# Patient Record
Sex: FEMALE | Race: WHITE | NOT HISPANIC OR LATINO | Employment: OTHER | URBAN - METROPOLITAN AREA
[De-identification: names, ages, dates, MRNs, and addresses within clinical notes are randomized per-mention and may not be internally consistent; named-entity substitution may affect disease eponyms.]

---

## 2018-02-12 ENCOUNTER — HOSPITAL ENCOUNTER (INPATIENT)
Facility: HOSPITAL | Age: 78
LOS: 1 days | Discharge: HOME | DRG: 395 | End: 2018-02-13
Attending: FAMILY MEDICINE | Admitting: FAMILY MEDICINE
Payer: MEDICARE

## 2018-02-12 ENCOUNTER — ANESTHESIA (OUTPATIENT)
Dept: ENDOSCOPY | Facility: HOSPITAL | Age: 78
DRG: 395 | End: 2018-02-12
Payer: MEDICARE

## 2018-02-12 ENCOUNTER — ANESTHESIA EVENT (OUTPATIENT)
Dept: ENDOSCOPY | Facility: HOSPITAL | Age: 78
DRG: 395 | End: 2018-02-12
Payer: MEDICARE

## 2018-02-12 DIAGNOSIS — K80.50 CHOLEDOCHOLITHIASIS: Primary | ICD-10-CM

## 2018-02-12 DIAGNOSIS — R10.9 ABDOMINAL PAIN, UNSPECIFIED ABDOMINAL LOCATION: ICD-10-CM

## 2018-02-12 LAB
ALBUMIN SERPL BCP-MCNC: 3.3 G/DL
ALP SERPL-CCNC: 216 U/L
ALT SERPL W/O P-5'-P-CCNC: 625 U/L
ANION GAP SERPL CALC-SCNC: 7 MMOL/L
APTT BLDCRRT: 23.6 SEC
AST SERPL-CCNC: 688 U/L
BASOPHILS # BLD AUTO: 0.02 K/UL
BASOPHILS NFR BLD: 0.2 %
BILIRUB SERPL-MCNC: 3.1 MG/DL
BUN SERPL-MCNC: 10 MG/DL
CALCIUM SERPL-MCNC: 8.5 MG/DL
CHLORIDE SERPL-SCNC: 106 MMOL/L
CO2 SERPL-SCNC: 25 MMOL/L
CREAT SERPL-MCNC: 0.7 MG/DL
DIFFERENTIAL METHOD: ABNORMAL
EOSINOPHIL # BLD AUTO: 0 K/UL
EOSINOPHIL NFR BLD: 0.2 %
ERYTHROCYTE [DISTWIDTH] IN BLOOD BY AUTOMATED COUNT: 13.1 %
EST. GFR  (AFRICAN AMERICAN): >60 ML/MIN/1.73 M^2
EST. GFR  (NON AFRICAN AMERICAN): >60 ML/MIN/1.73 M^2
ESTIMATED AVG GLUCOSE: 111 MG/DL
GLUCOSE SERPL-MCNC: 113 MG/DL
HBA1C MFR BLD HPLC: 5.5 %
HCT VFR BLD AUTO: 34.2 %
HGB BLD-MCNC: 11.1 G/DL
INR PPP: 1.1
LIPASE SERPL-CCNC: 113 U/L
LYMPHOCYTES # BLD AUTO: 0.7 K/UL
LYMPHOCYTES NFR BLD: 6 %
MAGNESIUM SERPL-MCNC: 1.9 MG/DL
MCH RBC QN AUTO: 30.2 PG
MCHC RBC AUTO-ENTMCNC: 32.5 G/DL
MCV RBC AUTO: 93 FL
MONOCYTES # BLD AUTO: 1 K/UL
MONOCYTES NFR BLD: 8.2 %
NEUTROPHILS # BLD AUTO: 10.1 K/UL
NEUTROPHILS NFR BLD: 85.1 %
PHOSPHATE SERPL-MCNC: 3.7 MG/DL
PLATELET # BLD AUTO: 144 K/UL
PMV BLD AUTO: 10.3 FL
POTASSIUM SERPL-SCNC: 3.7 MMOL/L
PROT SERPL-MCNC: 6.2 G/DL
PROTHROMBIN TIME: 11.4 SEC
RBC # BLD AUTO: 3.68 M/UL
SODIUM SERPL-SCNC: 138 MMOL/L
T4 FREE SERPL-MCNC: 1.28 NG/DL
TSH SERPL DL<=0.005 MIU/L-ACNC: 0.39 UIU/ML
WBC # BLD AUTO: 11.87 K/UL

## 2018-02-12 PROCEDURE — 37000008 HC ANESTHESIA 1ST 15 MINUTES: Performed by: INTERNAL MEDICINE

## 2018-02-12 PROCEDURE — 85730 THROMBOPLASTIN TIME PARTIAL: CPT

## 2018-02-12 PROCEDURE — 74328 X-RAY BILE DUCT ENDOSCOPY: CPT | Mod: 26,,, | Performed by: INTERNAL MEDICINE

## 2018-02-12 PROCEDURE — 43262 ENDO CHOLANGIOPANCREATOGRAPH: CPT | Mod: 51,,, | Performed by: INTERNAL MEDICINE

## 2018-02-12 PROCEDURE — 83690 ASSAY OF LIPASE: CPT

## 2018-02-12 PROCEDURE — 0FC98ZZ EXTIRPATION OF MATTER FROM COMMON BILE DUCT, VIA NATURAL OR ARTIFICIAL OPENING ENDOSCOPIC: ICD-10-PCS | Performed by: INTERNAL MEDICINE

## 2018-02-12 PROCEDURE — 85025 COMPLETE CBC W/AUTO DIFF WBC: CPT

## 2018-02-12 PROCEDURE — 84443 ASSAY THYROID STIM HORMONE: CPT

## 2018-02-12 PROCEDURE — 84439 ASSAY OF FREE THYROXINE: CPT

## 2018-02-12 PROCEDURE — 25000003 PHARM REV CODE 250: Performed by: INTERNAL MEDICINE

## 2018-02-12 PROCEDURE — S0028 INJECTION, FAMOTIDINE, 20 MG: HCPCS | Performed by: FAMILY MEDICINE

## 2018-02-12 PROCEDURE — 37000009 HC ANESTHESIA EA ADD 15 MINS: Performed by: INTERNAL MEDICINE

## 2018-02-12 PROCEDURE — 11000001 HC ACUTE MED/SURG PRIVATE ROOM

## 2018-02-12 PROCEDURE — 85610 PROTHROMBIN TIME: CPT

## 2018-02-12 PROCEDURE — 43262 ENDO CHOLANGIOPANCREATOGRAPH: CPT | Performed by: INTERNAL MEDICINE

## 2018-02-12 PROCEDURE — 43264 ERCP REMOVE DUCT CALCULI: CPT | Mod: ,,, | Performed by: INTERNAL MEDICINE

## 2018-02-12 PROCEDURE — 83036 HEMOGLOBIN GLYCOSYLATED A1C: CPT

## 2018-02-12 PROCEDURE — 43237 ENDOSCOPIC US EXAM ESOPH: CPT | Mod: 51,,, | Performed by: INTERNAL MEDICINE

## 2018-02-12 PROCEDURE — 63600175 PHARM REV CODE 636 W HCPCS: Performed by: FAMILY MEDICINE

## 2018-02-12 PROCEDURE — 63600175 PHARM REV CODE 636 W HCPCS: Performed by: NURSE ANESTHETIST, CERTIFIED REGISTERED

## 2018-02-12 PROCEDURE — 36415 COLL VENOUS BLD VENIPUNCTURE: CPT

## 2018-02-12 PROCEDURE — 94761 N-INVAS EAR/PLS OXIMETRY MLT: CPT

## 2018-02-12 PROCEDURE — 43264 ERCP REMOVE DUCT CALCULI: CPT | Performed by: INTERNAL MEDICINE

## 2018-02-12 PROCEDURE — 27201674 HC SPHINCTERTOME: Performed by: INTERNAL MEDICINE

## 2018-02-12 PROCEDURE — 80053 COMPREHEN METABOLIC PANEL: CPT

## 2018-02-12 PROCEDURE — 25000003 PHARM REV CODE 250: Performed by: NURSE ANESTHETIST, CERTIFIED REGISTERED

## 2018-02-12 PROCEDURE — 25000003 PHARM REV CODE 250: Performed by: FAMILY MEDICINE

## 2018-02-12 PROCEDURE — 27202125 HC BALLOON, EXTRACTION (ANY): Performed by: INTERNAL MEDICINE

## 2018-02-12 PROCEDURE — 0DJ08ZZ INSPECTION OF UPPER INTESTINAL TRACT, VIA NATURAL OR ARTIFICIAL OPENING ENDOSCOPIC: ICD-10-PCS | Performed by: INTERNAL MEDICINE

## 2018-02-12 PROCEDURE — 83735 ASSAY OF MAGNESIUM: CPT

## 2018-02-12 PROCEDURE — 84100 ASSAY OF PHOSPHORUS: CPT

## 2018-02-12 PROCEDURE — C1769 GUIDE WIRE: HCPCS | Performed by: INTERNAL MEDICINE

## 2018-02-12 RX ORDER — HYDROMORPHONE HYDROCHLORIDE 2 MG/ML
0.2 INJECTION, SOLUTION INTRAMUSCULAR; INTRAVENOUS; SUBCUTANEOUS EVERY 5 MIN PRN
Status: CANCELLED | OUTPATIENT
Start: 2018-02-12

## 2018-02-12 RX ORDER — GLUCAGON 1 MG
1 KIT INJECTION
Status: DISCONTINUED | OUTPATIENT
Start: 2018-02-12 | End: 2018-02-13 | Stop reason: HOSPADM

## 2018-02-12 RX ORDER — SODIUM CHLORIDE 0.9 % (FLUSH) 0.9 %
5 SYRINGE (ML) INJECTION
Status: DISCONTINUED | OUTPATIENT
Start: 2018-02-12 | End: 2018-02-13 | Stop reason: HOSPADM

## 2018-02-12 RX ORDER — SODIUM CHLORIDE 0.9 % (FLUSH) 0.9 %
3 SYRINGE (ML) INJECTION EVERY 8 HOURS
Status: CANCELLED | OUTPATIENT
Start: 2018-02-12

## 2018-02-12 RX ORDER — IBUPROFEN 200 MG
16 TABLET ORAL
Status: DISCONTINUED | OUTPATIENT
Start: 2018-02-12 | End: 2018-02-13 | Stop reason: HOSPADM

## 2018-02-12 RX ORDER — FENTANYL CITRATE 50 UG/ML
INJECTION, SOLUTION INTRAMUSCULAR; INTRAVENOUS
Status: DISCONTINUED | OUTPATIENT
Start: 2018-02-12 | End: 2018-02-12

## 2018-02-12 RX ORDER — CEFEPIME HYDROCHLORIDE 1 G/50ML
1 INJECTION, SOLUTION INTRAVENOUS
Status: DISCONTINUED | OUTPATIENT
Start: 2018-02-12 | End: 2018-02-12

## 2018-02-12 RX ORDER — IBUPROFEN 200 MG
24 TABLET ORAL
Status: DISCONTINUED | OUTPATIENT
Start: 2018-02-12 | End: 2018-02-13 | Stop reason: HOSPADM

## 2018-02-12 RX ORDER — SODIUM CHLORIDE 9 MG/ML
INJECTION, SOLUTION INTRAVENOUS CONTINUOUS
Status: DISCONTINUED | OUTPATIENT
Start: 2018-02-12 | End: 2018-02-13

## 2018-02-12 RX ORDER — GLYCOPYRROLATE 0.2 MG/ML
INJECTION INTRAMUSCULAR; INTRAVENOUS
Status: DISCONTINUED | OUTPATIENT
Start: 2018-02-12 | End: 2018-02-12

## 2018-02-12 RX ORDER — LEVOTHYROXINE SODIUM 75 UG/1
75 TABLET ORAL DAILY
COMMUNITY
End: 2018-03-01 | Stop reason: DRUGHIGH

## 2018-02-12 RX ORDER — IPRATROPIUM BROMIDE AND ALBUTEROL SULFATE 2.5; .5 MG/3ML; MG/3ML
3 SOLUTION RESPIRATORY (INHALATION) EVERY 6 HOURS PRN
Status: DISCONTINUED | OUTPATIENT
Start: 2018-02-12 | End: 2018-02-13 | Stop reason: HOSPADM

## 2018-02-12 RX ORDER — HYDROMORPHONE HYDROCHLORIDE 2 MG/ML
1 INJECTION, SOLUTION INTRAMUSCULAR; INTRAVENOUS; SUBCUTANEOUS EVERY 4 HOURS PRN
Status: DISCONTINUED | OUTPATIENT
Start: 2018-02-12 | End: 2018-02-12

## 2018-02-12 RX ORDER — FAMOTIDINE 10 MG/ML
20 INJECTION INTRAVENOUS 2 TIMES DAILY
Status: DISCONTINUED | OUTPATIENT
Start: 2018-02-12 | End: 2018-02-13 | Stop reason: HOSPADM

## 2018-02-12 RX ORDER — SIMVASTATIN 40 MG/1
40 TABLET, FILM COATED ORAL NIGHTLY
Status: DISCONTINUED | OUTPATIENT
Start: 2018-02-12 | End: 2018-02-13 | Stop reason: HOSPADM

## 2018-02-12 RX ORDER — ONDANSETRON 2 MG/ML
4 INJECTION INTRAMUSCULAR; INTRAVENOUS EVERY 8 HOURS PRN
Status: DISCONTINUED | OUTPATIENT
Start: 2018-02-12 | End: 2018-02-13 | Stop reason: HOSPADM

## 2018-02-12 RX ORDER — HYDROMORPHONE HYDROCHLORIDE 2 MG/ML
1 INJECTION, SOLUTION INTRAMUSCULAR; INTRAVENOUS; SUBCUTANEOUS EVERY 4 HOURS PRN
Status: DISCONTINUED | OUTPATIENT
Start: 2018-02-12 | End: 2018-02-13 | Stop reason: HOSPADM

## 2018-02-12 RX ORDER — INSULIN ASPART 100 [IU]/ML
1-10 INJECTION, SOLUTION INTRAVENOUS; SUBCUTANEOUS
Status: DISCONTINUED | OUTPATIENT
Start: 2018-02-12 | End: 2018-02-13 | Stop reason: HOSPADM

## 2018-02-12 RX ORDER — RAMELTEON 8 MG/1
8 TABLET ORAL NIGHTLY PRN
Status: DISCONTINUED | OUTPATIENT
Start: 2018-02-12 | End: 2018-02-13 | Stop reason: HOSPADM

## 2018-02-12 RX ORDER — INDOMETHACIN 50 MG/1
100 SUPPOSITORY RECTAL ONCE
Status: COMPLETED | OUTPATIENT
Start: 2018-02-12 | End: 2018-02-12

## 2018-02-12 RX ORDER — PROPOFOL 10 MG/ML
VIAL (ML) INTRAVENOUS CONTINUOUS PRN
Status: DISCONTINUED | OUTPATIENT
Start: 2018-02-12 | End: 2018-02-12

## 2018-02-12 RX ORDER — SODIUM CHLORIDE 0.9 % (FLUSH) 0.9 %
3 SYRINGE (ML) INJECTION
Status: CANCELLED | OUTPATIENT
Start: 2018-02-12

## 2018-02-12 RX ORDER — AMOXICILLIN AND CLAVULANATE POTASSIUM 500; 125 MG/1; MG/1
1 TABLET, FILM COATED ORAL 3 TIMES DAILY
Qty: 42 TABLET | Refills: 0 | Status: SHIPPED | OUTPATIENT
Start: 2018-02-12 | End: 2018-02-26

## 2018-02-12 RX ORDER — LEVOTHYROXINE SODIUM 75 UG/1
75 TABLET ORAL
Status: DISCONTINUED | OUTPATIENT
Start: 2018-02-12 | End: 2018-02-13 | Stop reason: HOSPADM

## 2018-02-12 RX ORDER — LIDOCAINE HCL/PF 100 MG/5ML
SYRINGE (ML) INTRAVENOUS
Status: DISCONTINUED | OUTPATIENT
Start: 2018-02-12 | End: 2018-02-12

## 2018-02-12 RX ADMIN — SODIUM CHLORIDE: 0.9 INJECTION, SOLUTION INTRAVENOUS at 01:02

## 2018-02-12 RX ADMIN — GLYCOPYRROLATE 0.1 MG: 0.2 INJECTION, SOLUTION INTRAMUSCULAR; INTRAVENOUS at 08:02

## 2018-02-12 RX ADMIN — ONDANSETRON 4 MG: 2 INJECTION INTRAMUSCULAR; INTRAVENOUS at 07:02

## 2018-02-12 RX ADMIN — HYDROMORPHONE HYDROCHLORIDE 1 MG: 2 INJECTION, SOLUTION INTRAMUSCULAR; INTRAVENOUS; SUBCUTANEOUS at 03:02

## 2018-02-12 RX ADMIN — SODIUM CHLORIDE: 0.9 INJECTION, SOLUTION INTRAVENOUS at 05:02

## 2018-02-12 RX ADMIN — LIDOCAINE HYDROCHLORIDE 75 MG: 20 INJECTION, SOLUTION INTRAVENOUS at 08:02

## 2018-02-12 RX ADMIN — FAMOTIDINE 20 MG: 10 INJECTION, SOLUTION INTRAVENOUS at 07:02

## 2018-02-12 RX ADMIN — FENTANYL CITRATE 50 MCG: 50 INJECTION, SOLUTION INTRAMUSCULAR; INTRAVENOUS at 08:02

## 2018-02-12 RX ADMIN — SIMVASTATIN 40 MG: 40 TABLET, FILM COATED ORAL at 03:02

## 2018-02-12 RX ADMIN — LEVOTHYROXINE SODIUM 75 MCG: 75 TABLET ORAL at 05:02

## 2018-02-12 RX ADMIN — FAMOTIDINE 20 MG: 10 INJECTION, SOLUTION INTRAVENOUS at 08:02

## 2018-02-12 RX ADMIN — PROPOFOL 150 MCG/KG/MIN: 10 INJECTION, EMULSION INTRAVENOUS at 08:02

## 2018-02-12 RX ADMIN — INDOMETHACIN 100 MG: 50 SUPPOSITORY RECTAL at 09:02

## 2018-02-12 RX ADMIN — CEFEPIME HYDROCHLORIDE 1 G: 1 INJECTION, SOLUTION INTRAVENOUS at 07:02

## 2018-02-12 RX ADMIN — SIMVASTATIN 40 MG: 40 TABLET, FILM COATED ORAL at 08:02

## 2018-02-12 NOTE — DISCHARGE INSTRUCTIONS
Post ERCP Discharge Instructions:     Shanthi Can  2/12/2018  Dejon Palacios III, MD    1. Diet: Try sips of water first. If tolerated, resume your regular diet or one recommended by your physician.  2. Do not drive, or operate machinery, make critical decisions, or do activities that require coordination or balance for 24 hours.  3. You may experience a sore throat for 24 to 48 hours. You may use throat lozenges or gargle with warm salt water to relieve the discomfort.  4. Because air was put into your stomach during the procedure, you may experience some belching.  5. Go directly to the emergency room if you notice any of the following:                              *Chills and/or fever over 101                *Persistent vomiting or vomiting with blood                *Severe abdominal pain, other than gas cramps                *Severe chest pain                *Black, tarry stools    If you have any questions or problems, please call your Physician:    Dejon Palacios III, MD     If a complication or emergency situation arises and you are unable to reach your Physician - GO TO THE EMERGENCY ROOM.  Post Upper EUS Instructions:     Shanthi Can  2/12/2018  Dejon Palacios III, MD    1. Diet: Try sips of water first. If tolerated, resume your regular diet or one recommended by your physician.  2. Do not drive, or operate machinery, make critical decisions, or do activities that require coordination or balance for 24 hours.  3. You may experience a sore throat for 24 to 48 hours. You may use throat lozenges or gargle with warm salt water to relieve the discomfort.  4. Because air was put into your stomach during the procedure, you may experience some belching.   Go directly to the emergency room if you notice any of the following:                              Chills and/or fever over 101                Persistent vomiting or vomiting with blood                Severe abdominal pain, other than gas cramps                 Severe chest pain                Black, tarry stools    If you have any questions or problems, please call your Physician:    Dejon Palacios III, MD     Lab Results: [phone]    If a complication or emergency situation arises and you are unable to reach your Physician - GO TO THE EMERGENCY ROOM.

## 2018-02-12 NOTE — PLAN OF CARE
Patient has adequate support to assist at discharge.  She is independent with all activities of daily living.  Patient request bedside pharmacy delivery.  She is aware that her discharge is scheduled for tomorrow.  Patient reported she will discharge home with 14 days of PO antibiotic.   said she will request that the physicians write orders for antibiotic to be delivered to bedside today.    Dr. Honeycutt informed that patient request antibiotic be delivered from bedside pharmacy.  [facility] bedside pharmacy will be closed tomorrow.  Dr. Honeycutt said he will write order today.         02/12/18 1302   Discharge Assessment   Assessment Type Discharge Planning Assessment   Assessment information obtained from? Patient;Other  ( met with patient, , and son at bedside.)   Expected Length of Stay (days) 2   Communicated expected length of stay with patient/caregiver yes   Prior to hospitilization cognitive status: Alert/Oriented   Prior to hospitalization functional status: Independent   Current cognitive status: Alert/Oriented   Current Functional Status: Independent   Facility Arrived From: (Home)   Lives With spouse   Able to Return to Prior Arrangements yes   Is patient able to care for self after discharge? Yes   Who are your caregiver(s) and their phone number(s)? (Son, Casimiro Can [phone])   Patient's perception of discharge disposition home or selfcare   Readmission Within The Last 30 Days no previous admission in last 30 days   Patient currently being followed by outpatient case management? No   Patient currently receives any other outside agency services? No   Equipment Currently Used at Home none   Do you have any problems affording any of your prescribed medications? No   Is the patient taking medications as prescribed? yes   Does the patient have transportation home? Yes   Transportation Available family or friend will provide   Does the patient receive services at the  [facility]? No   Discharge Plan A Home;Home with family   Discharge Plan B Home with family   Patient/Family In Agreement With Plan yes   Does the patient have transportation to healthcare appointments? Yes

## 2018-02-12 NOTE — TRANSFER OF CARE
"Anesthesia Transfer of Care Note    Patient: Shanthi Can    Procedure(s) Performed: Procedure(s) (LRB):  ERCP (N/A)  ULTRASOUND-ENDOSCOPIC-UPPER (N/A)    Patient location: GI    Anesthesia Type: MAC    Transport from OR: Transported from OR on room air with adequate spontaneous ventilation    Post pain: adequate analgesia    Post assessment: no apparent anesthetic complications    Post vital signs: stable    Level of consciousness: responds to stimulation and sedated    Nausea/Vomiting: no nausea/vomiting    Complications: none    Transfer of care protocol was followed      Last vitals:   Visit Vitals  BP (!) 105/56   Pulse 83   Temp 36.8 °C (98.3 °F) (Oral)   Resp 18   Ht 4' 11" (1.499 m)   Wt 62.5 kg (137 lb 12.6 oz)   SpO2 95%   Breastfeeding? No   BMI 27.83 kg/m²     "

## 2018-02-12 NOTE — ASSESSMENT & PLAN NOTE
Highly suspect choledocholithiasis vs. Biliary stricture with partial obstruction. No imaging to review.   - Will plan for ERCP today  - Keep NPO and hold anticoagulants  - Add on lipase   - Further recs to follow

## 2018-02-12 NOTE — PLAN OF CARE
Problem: Patient Care Overview  Goal: Plan of Care Review  Outcome: Ongoing (interventions implemented as appropriate)  AAOX3.  Respirations even and unlabpred; breath sounds clear.  O2 2L NC in place with saturation 98%.  Denies n/v and sob.  C/o lower back pain; received dilaudid 1mg iv with relief of pain.  Tolerating ice chips.  Bowel sounds active with no tenderness noted.  Fall precaution sign on door.  Fall precaution armband on.  Family members x2 at bedside.  Instructed to call for assistance.  Will cont to monitor.

## 2018-02-12 NOTE — ANESTHESIA POSTPROCEDURE EVALUATION
"Anesthesia Post Evaluation    Patient: Shanthi Can    Procedure(s) Performed: Procedure(s) (LRB):  ERCP (N/A)  ULTRASOUND-ENDOSCOPIC-UPPER (N/A)    Final Anesthesia Type: MAC  Patient location during evaluation: GI PACU  Patient participation: Yes- Able to Participate  Level of consciousness: awake and alert  Post-procedure vital signs: reviewed and stable  Pain management: adequate  Airway patency: patent  PONV status at discharge: No PONV  Anesthetic complications: no      Cardiovascular status: hemodynamically stable  Respiratory status: unassisted, spontaneous ventilation and room air  Hydration status: euvolemic  Follow-up not needed.        Visit Vitals  BP (!) 105/56   Pulse 83   Temp 36.8 °C (98.3 °F) (Oral)   Resp 18   Ht 4' 11" (1.499 m)   Wt 62.5 kg (137 lb 12.6 oz)   SpO2 95%   Breastfeeding? No   BMI 27.83 kg/m²       Pain/Deya Score: Pain Assessment Performed: Yes (2/12/2018  8:23 AM)  Presence of Pain: denies (2/12/2018  8:23 AM)  Pain Rating Prior to Med Admin: 6 (2/12/2018  3:20 AM)  Pain Rating Post Med Admin: 2 (2/12/2018  5:40 AM)      "

## 2018-02-12 NOTE — CONSULTS
[facility]  Gastroenterology  Consult Note    Patient Name: Shanthi Can  MRN: [id number]  Admission Date: 2/12/2018  Hospital Length of Stay: 0 days  Code Status: Full Code   Attending Provider: Yoandy Smith MD  Consulting Provider: Toñito Garcia MD  Primary Care Physician: Primary Doctor No  Principal Problem:Choledocholithiasis    Consults  Subjective:     HPI:  78 yo F h/o gallstone pancreatitis p/w 1 week of intermittent, epigastric, colicky, sharp pain lasting a few hours and resolving starting 6 days ago not a/w changes in bowels or n/v. After a few mild episodes and then another more painful experience on yesterday she presented to the Ed. She is transferred for possible choledocholithiasis. She denies fever, jaundice, or confusion. Pain is improved with dilaudid. NPO since midnight x meds. One year ago she had lap ruby for biliary pancreatitis. She has never had ERCP before and takes no blood thinners except ASA 81 mg.     Past Medical History:   Diagnosis Date    Encounter for blood transfusion     GERD (gastroesophageal reflux disease)        Past Surgical History:   Procedure Laterality Date    CHOLECYSTECTOMY  2017    DILATION AND CURETTAGE OF UTERUS      TENDON RELEASE Bilateral     GREAT TOES    TONSILLECTOMY         Review of patient's allergies indicates:  No Known Allergies  Family History     None        Social History Main Topics    Smoking status: Former Smoker     Packs/day: 1.00     Years: 20.00     Types: Cigarettes     Quit date: 1985    Smokeless tobacco: Never Used    Alcohol use Yes      Comment: OCCASSIONAL    Drug use: No    Sexual activity: Not Currently     Review of Systems   Constitutional: Negative for activity change, appetite change, chills and fever.   HENT: Negative for congestion, facial swelling, sore throat and trouble swallowing.    Eyes: Negative for pain and redness.   Respiratory: Negative for cough, chest tightness and shortness of  breath.    Cardiovascular: Negative for chest pain and palpitations.   Gastrointestinal: Positive for abdominal pain. Negative for abdominal distention, blood in stool, constipation, diarrhea and nausea.   Endocrine: Negative for polydipsia and polyuria.   Genitourinary: Negative for difficulty urinating and dysuria.   Musculoskeletal: Negative for joint swelling and neck pain.   Neurological: Negative for dizziness and headaches.   Hematological: Negative for adenopathy. Does not bruise/bleed easily.   Psychiatric/Behavioral: Negative for agitation and confusion.     Objective:     Vital Signs (Most Recent):  Temp: 97.4 °F (36.3 °C) (02/12/18 0424)  Pulse: 81 (02/12/18 0424)  Resp: 18 (02/12/18 0424)  BP: (!) 111/55 (02/12/18 0424)  SpO2: 98 % (02/12/18 0424) Vital Signs (24h Range):  Temp:  [97.4 °F (36.3 °C)-99.6 °F (37.6 °C)] 97.4 °F (36.3 °C)  Pulse:  [81-91] 81  Resp:  [18] 18  SpO2:  [98 %] 98 %  BP: (111-113)/(54-55) 111/55     Weight: 62.5 kg (137 lb 12.6 oz) (02/12/18 0120)  Body mass index is 27.83 kg/m².    No intake or output data in the 24 hours ending 02/12/18 0626    Lines/Drains/Airways     Peripheral Intravenous Line                 Peripheral IV - Single Lumen 02/11/18 Right Antecubital 1 day                Physical Exam   Constitutional: She is oriented to person, place, and time. She appears well-developed and well-nourished.   HENT:   Head: Normocephalic.   Nose: Nose normal.   Mouth/Throat: Oropharynx is clear and moist.   Eyes: Conjunctivae and EOM are normal.   constricted b/l pupils   Neck: Normal range of motion. Neck supple. No JVD present.   Cardiovascular: Normal rate, regular rhythm and normal heart sounds.    No murmur heard.  Pulmonary/Chest: Effort normal and breath sounds normal. She has no rales.   Abdominal: Soft. Bowel sounds are normal. She exhibits no distension. There is no tenderness. There is no guarding.   Musculoskeletal: Normal range of motion. She exhibits no edema or  deformity.   Neurological: She is alert and oriented to person, place, and time. No cranial nerve deficit.   Skin: Skin is warm and dry. Capillary refill takes less than 2 seconds.   Abdominal healed surgical scars   Psychiatric: She has a normal mood and affect. Her behavior is normal. Thought content normal.       Significant Labs:  CBC:   Recent Labs  Lab 02/12/18  0401   WBC 11.87   HGB 11.1*   HCT 34.2*   *     CMP:   Recent Labs  Lab 02/12/18  0401   *   CALCIUM 8.5*   ALBUMIN 3.3*   PROT 6.2      K 3.7   CO2 25      BUN 10   CREATININE 0.7   ALKPHOS 216*   *   *   BILITOT 3.1*       Significant Imaging:  None on file    Assessment/Plan:     * Choledocholithiasis    Highly suspect choledocholithiasis vs. Biliary stricture with partial obstruction. No imaging to review.   - Will plan for ERCP today  - Keep NPO and hold anticoagulants  - Add on lipase   - Further recs to follow            Thank you for your consult. I will follow-up with patient. Please contact us if you have any additional questions.    Toñito Garcia MD  Gastroenterology  [facility]

## 2018-02-12 NOTE — HPI
78 yo F h/o gallstone pancreatitis p/w 1 week of intermittent, epigastric, colicky, sharp pain lasting a few hours and resolving starting 6 days ago not a/w changes in bowels or n/v. After a few mild episodes and then another more painful experience on yesterday she presented to the Ed. She is transferred for possible choledocholithiasis. She denies fever, jaundice, or confusion. Pain is improved with dilaudid. NPO since midnight x meds. One year ago she had lap ruby for biliary pancreatitis. She has never had ERCP before and takes no blood thinners except ASA 81 mg.

## 2018-02-12 NOTE — ANESTHESIA PREPROCEDURE EVALUATION
02/12/2018  Shanthi Can is a 77 y.o., female w gall stone cholangitis admitted thru ED for ERCP.    PRIOR ANES (in Epic)  [id number]   none    ANES-RELATED MED/SURG  Patient Active Problem List   Diagnosis    Choledocholithiasis     Past Medical History:   Diagnosis Date    Encounter for blood transfusion     GERD (gastroesophageal reflux disease)      Past Surgical History:   Procedure Laterality Date    CHOLECYSTECTOMY  2017    DILATION AND CURETTAGE OF UTERUS      TENDON RELEASE Bilateral     GREAT TOES    TONSILLECTOMY       ALLERGIES  Review of patient's allergies indicates:  No Known Allergies    ANES-RELATED MAR [id number]  simvistatin  levothyroxine  famotidine cefipime    Anesthesia Evaluation         Review of Systems  Anesthesia Hx:  History of prior surgery of interest to airway management or planning: Denies Personal Hx of Anesthesia complications.   Cardiovascular:  Cardiovascular Normal     Pulmonary:  Pulmonary Normal    Hepatic/GI:   GERD [id number] bililary tract obstruction; admitted for abd pain   Endocrine:   Hypothyroidism        Physical Exam  General:  Well nourished    Airway/Jaw/Neck:  AIRWAY FINDINGS: Normal       Dental:  DENTAL FINDINGS: Normal   Chest/Lungs:  Chest/Lungs Clear        Mental Status:  Mental Status Findings: Normal       Wt Readings from Last 1 Encounters:   02/12/18 62.5 kg (137 lb 12.6 oz)     Temp Readings from Last 1 Encounters:   02/12/18 36.8 °C (98.3 °F) (Oral)     BP Readings from Last 1 Encounters:   02/12/18 (!) 109/54     Pulse Readings from Last 1 Encounters:   02/12/18 80     SpO2 Readings from Last 1 Encounters:   02/12/18 95%       Anesthesia Plan  Type of Anesthesia, risks & benefits discussed:  Anesthesia Type:  MAC  Patient's Preference:   Intra-op Monitoring Plan:   Intra-op Monitoring Plan Comments:   Post Op Pain Control Plan:   Post Op  Pain Control Plan Comments:   Induction:    Beta Blocker:  Patient is not currently on a Beta-Blocker (No further documentation required).       Informed Consent: Patient understands risks and agrees with Anesthesia plan.  Questions answered. Anesthesia consent signed with patient.  ASA Score: 2     Day of Surgery Review of History & Physical:            Ready For Surgery From Anesthesia Perspective.      Lab Results   Component Value Date    WBC 11.87 02/12/2018    HGB 11.1 (L) 02/12/2018    HCT 34.2 (L) 02/12/2018    MCV 93 02/12/2018     (L) 02/12/2018       Chemistry        Component Value Date/Time     02/12/2018 0401    K 3.7 02/12/2018 0401     02/12/2018 0401    CO2 25 02/12/2018 0401    BUN 10 02/12/2018 0401    CREATININE 0.7 02/12/2018 0401     (H) 02/12/2018 0401        Component Value Date/Time    CALCIUM 8.5 (L) 02/12/2018 0401    ALKPHOS 216 (H) 02/12/2018 0401     (H) 02/12/2018 0401     (H) 02/12/2018 0401    BILITOT 3.1 (H) 02/12/2018 0401    ESTGFRAFRICA >60 02/12/2018 0401    EGFRNONAA >60 02/12/2018 0401          Lab Results   Component Value Date    ALBUMIN 3.3 (L) 02/12/2018    No results found for: TSH, Q2UAZUM, W7BMRFF, THYROIDAB    CXR      EKG      ECHO

## 2018-02-12 NOTE — SUBJECTIVE & OBJECTIVE
Past Medical History:   Diagnosis Date    Encounter for blood transfusion     GERD (gastroesophageal reflux disease)        Past Surgical History:   Procedure Laterality Date    CHOLECYSTECTOMY  2017    DILATION AND CURETTAGE OF UTERUS      TENDON RELEASE Bilateral     GREAT TOES    TONSILLECTOMY         Review of patient's allergies indicates:  No Known Allergies  Family History     None        Social History Main Topics    Smoking status: Former Smoker     Packs/day: 1.00     Years: 20.00     Types: Cigarettes     Quit date: 1985    Smokeless tobacco: Never Used    Alcohol use Yes      Comment: OCCASSIONAL    Drug use: No    Sexual activity: Not Currently     Review of Systems   Constitutional: Negative for activity change, appetite change, chills and fever.   HENT: Negative for congestion, facial swelling, sore throat and trouble swallowing.    Eyes: Negative for pain and redness.   Respiratory: Negative for cough, chest tightness and shortness of breath.    Cardiovascular: Negative for chest pain and palpitations.   Gastrointestinal: Positive for abdominal pain. Negative for abdominal distention, blood in stool, constipation, diarrhea and nausea.   Endocrine: Negative for polydipsia and polyuria.   Genitourinary: Negative for difficulty urinating and dysuria.   Musculoskeletal: Negative for joint swelling and neck pain.   Neurological: Negative for dizziness and headaches.   Hematological: Negative for adenopathy. Does not bruise/bleed easily.   Psychiatric/Behavioral: Negative for agitation and confusion.     Objective:     Vital Signs (Most Recent):  Temp: 97.4 °F (36.3 °C) (02/12/18 0424)  Pulse: 81 (02/12/18 0424)  Resp: 18 (02/12/18 0424)  BP: (!) 111/55 (02/12/18 0424)  SpO2: 98 % (02/12/18 0424) Vital Signs (24h Range):  Temp:  [97.4 °F (36.3 °C)-99.6 °F (37.6 °C)] 97.4 °F (36.3 °C)  Pulse:  [81-91] 81  Resp:  [18] 18  SpO2:  [98 %] 98 %  BP: (111-113)/(54-55) 111/55     Weight: 62.5 kg (137 lb  12.6 oz) (02/12/18 0120)  Body mass index is 27.83 kg/m².    No intake or output data in the 24 hours ending 02/12/18 0626    Lines/Drains/Airways     Peripheral Intravenous Line                 Peripheral IV - Single Lumen 02/11/18 Right Antecubital 1 day                Physical Exam   Constitutional: She is oriented to person, place, and time. She appears well-developed and well-nourished.   HENT:   Head: Normocephalic.   Nose: Nose normal.   Mouth/Throat: Oropharynx is clear and moist.   Eyes: Conjunctivae and EOM are normal.   constricted b/l pupils   Neck: Normal range of motion. Neck supple. No JVD present.   Cardiovascular: Normal rate, regular rhythm and normal heart sounds.    No murmur heard.  Pulmonary/Chest: Effort normal and breath sounds normal. She has no rales.   Abdominal: Soft. Bowel sounds are normal. She exhibits no distension. There is no tenderness. There is no guarding.   Musculoskeletal: Normal range of motion. She exhibits no edema or deformity.   Neurological: She is alert and oriented to person, place, and time. No cranial nerve deficit.   Skin: Skin is warm and dry. Capillary refill takes less than 2 seconds.   Abdominal healed surgical scars   Psychiatric: She has a normal mood and affect. Her behavior is normal. Thought content normal.       Significant Labs:  CBC:   Recent Labs  Lab 02/12/18  0401   WBC 11.87   HGB 11.1*   HCT 34.2*   *     CMP:   Recent Labs  Lab 02/12/18  0401   *   CALCIUM 8.5*   ALBUMIN 3.3*   PROT 6.2      K 3.7   CO2 25      BUN 10   CREATININE 0.7   ALKPHOS 216*   *   *   BILITOT 3.1*       Significant Imaging:  None on file

## 2018-02-12 NOTE — H&P
History & Physical  [facility]    Date of Admit  2/12/2018    SUBJECTIVE:     Chief Complaint: Epigastric Pain     History of Present Illness:  Patient is a 78 yo F with a pmhx of GERD, high cholesterol, hypothyroid and pancreatitis that presents with abdominal pain in the epigastric area.  Patient is stating this pain feels like the same pain she had when her gallbladder was removed and she was diagnosed with pancreatitis.  However upon exam patient states pain is only in the epigastric area only.  Patient found to have choledocholithiasis with moderate biliary ductal dilation and transferred to us for GI evaluation.      Review of patient's allergies indicates:  No Known Allergies  Past Medical History:   Diagnosis Date    Encounter for blood transfusion     GERD (gastroesophageal reflux disease)      Past Surgical History:   Procedure Laterality Date    CHOLECYSTECTOMY  2017    DILATION AND CURETTAGE OF UTERUS      TENDON RELEASE Bilateral     GREAT TOES    TONSILLECTOMY       History reviewed. No pertinent family history.  Social History   Substance Use Topics    Smoking status: Former Smoker     Packs/day: 1.00     Years: 20.00     Types: Cigarettes     Quit date: 1985    Smokeless tobacco: Never Used    Alcohol use Yes      Comment: OCCASSIONAL      No current facility-administered medications on file prior to encounter.      No current outpatient prescriptions on file prior to encounter.       Review of Systems:  Patient with complaints of pain in the diaphragm and epigastric area that radiates to her back    OBJECTIVE:     Vital Signs (Most Recent)  Temp: 98.3 °F (36.8 °C) (02/12/18 0719)  Pulse: 80 (02/12/18 0719)  Resp: 18 (02/12/18 0719)  BP: (!) 109/54 (02/12/18 0719)  SpO2: 98 % (02/12/18 0424)    Vital Signs Range (Last 24H):  Temp:  [97.4 °F (36.3 °C)-99.6 °F (37.6 °C)]   Pulse:  [80-91]   Resp:  [18]   BP: (109-113)/(54-55)   SpO2:  [98 %]     Body mass index is 27.83 kg/m².    I &  O (Last 24H):    Intake/Output Summary (Last 24 hours) at 02/12/18 0738  Last data filed at 02/12/18 0600   Gross per 24 hour   Intake              900 ml   Output                0 ml   Net              900 ml     Wt Readings from Last 3 Encounters:   02/12/18 62.5 kg (137 lb 12.6 oz)       Current Diet Order   Procedures    Diet NPO Except for: Ice Chips, Sips with Medication, Medication     Order Specific Question:   Except for     Answer:   Ice Chips     Order Specific Question:   Except for     Answer:   Sips with Medication     Order Specific Question:   Except for     Answer:   Medication        Physical Exam:  GEN:  NAD  HEENT:  HI PERRL OP Clear  CV:  RRR no m/r/g  RESP:  CTAB no w/r/r  ABD:  Mild epigastric tenderness +BS  NEURO:  No neurological deficits.        Laboratory:  LABS  CBC    Recent Labs  Lab 02/12/18  0401   WBC 11.87   RBC 3.68*   HGB 11.1*   HCT 34.2*   *   MCV 93   MCH 30.2   MCHC 32.5     BMP    Recent Labs  Lab 02/12/18  0401      K 3.7      CO2 25   BUN 10   CREATININE 0.7   *       Recent Labs  Lab 02/12/18  0401   CALCIUM 8.5*   MG 1.9   PHOS 3.7       POCT-Glucose  No results found for: POCTGLUCOSE  LFT    Recent Labs  Lab 02/12/18  0401   PROT 6.2   ALBUMIN 3.3*   BILITOT 3.1*   *   ALKPHOS 216*   *     COAGS    Recent Labs  Lab 02/12/18  0401   INR 1.1   APTT 23.6     CE  No results for input(s): TROPONINI, CKTOTAL, CKMB in the last 168 hours.  ABGs  No results for input(s): PH, PCO2, PO2, HCO3, POCSATURATED, BE in the last 24 hours.  BNP  No results for input(s): BNP in the last 168 hours.  UA  Urinalysis  No results for input(s): COLORU, CLARITYU, SPECGRAV, PHUR, PROTEINUA, GLUCOSEU, BILIRUBINCON, BLOODU, WBCU, RBCU, BACTERIA, MUCUS, NITRITE, LEUKOCYTESUR, UROBILINOGEN, HYALINECASTS in the last 24 hours.  LAST HbA1c  No results found for: HGBA1C    EKG Normal     Imaging Results    None     CT ABD / Pelvis Wo  Choledocholithiasis with  moderate biliary ductal dilation     ASSESSMENT/PLAN:     Shanthi Can is a 77 y.o. female who  has a past medical history of Encounter for blood transfusion and GERD (gastroesophageal reflux disease). presents with Choledocholithiasis      Plan: Admit to [facility] - Attending Dr Palacios     Choledocholithiasis  -GI consult this morning  -Worsening LFTs  -Pain control with dilaudid as needed  -IVFs and antiemetics       DVT Prophylaxis   SCD'S    GI prophylaxis  Plan: famotidine for ulcer prevention.     Dispo: Pending Clinical improvment  Plan discussed with Staff      Silverio Clarke Jr., MD  [facility]  02/12/2018 1:54 AM

## 2018-02-13 VITALS
RESPIRATION RATE: 20 BRPM | BODY MASS INDEX: 29.24 KG/M2 | HEART RATE: 87 BPM | HEIGHT: 59 IN | OXYGEN SATURATION: 94 % | SYSTOLIC BLOOD PRESSURE: 134 MMHG | WEIGHT: 145.06 LBS | TEMPERATURE: 98 F | DIASTOLIC BLOOD PRESSURE: 64 MMHG

## 2018-02-13 LAB
ALBUMIN SERPL BCP-MCNC: 3.1 G/DL
ALP SERPL-CCNC: 197 U/L
ALT SERPL W/O P-5'-P-CCNC: 402 U/L
ANION GAP SERPL CALC-SCNC: 6 MMOL/L
AST SERPL-CCNC: 263 U/L
BASOPHILS # BLD AUTO: 0.02 K/UL
BASOPHILS NFR BLD: 0.3 %
BILIRUB SERPL-MCNC: 2.2 MG/DL
BUN SERPL-MCNC: 9 MG/DL
CALCIUM SERPL-MCNC: 8.2 MG/DL
CHLORIDE SERPL-SCNC: 108 MMOL/L
CO2 SERPL-SCNC: 23 MMOL/L
CREAT SERPL-MCNC: 0.6 MG/DL
DIFFERENTIAL METHOD: ABNORMAL
EOSINOPHIL # BLD AUTO: 0.1 K/UL
EOSINOPHIL NFR BLD: 1.4 %
ERYTHROCYTE [DISTWIDTH] IN BLOOD BY AUTOMATED COUNT: 13.7 %
EST. GFR  (AFRICAN AMERICAN): >60 ML/MIN/1.73 M^2
EST. GFR  (NON AFRICAN AMERICAN): >60 ML/MIN/1.73 M^2
GLUCOSE SERPL-MCNC: 101 MG/DL
HCT VFR BLD AUTO: 33 %
HGB BLD-MCNC: 10.8 G/DL
LYMPHOCYTES # BLD AUTO: 1.6 K/UL
LYMPHOCYTES NFR BLD: 22.2 %
MAGNESIUM SERPL-MCNC: 2.3 MG/DL
MCH RBC QN AUTO: 31 PG
MCHC RBC AUTO-ENTMCNC: 32.7 G/DL
MCV RBC AUTO: 95 FL
MONOCYTES # BLD AUTO: 0.7 K/UL
MONOCYTES NFR BLD: 8.9 %
NEUTROPHILS # BLD AUTO: 4.9 K/UL
NEUTROPHILS NFR BLD: 67.2 %
PHOSPHATE SERPL-MCNC: 1.8 MG/DL
PLATELET # BLD AUTO: 138 K/UL
PMV BLD AUTO: 10.8 FL
POTASSIUM SERPL-SCNC: 3.7 MMOL/L
PROT SERPL-MCNC: 6.2 G/DL
RBC # BLD AUTO: 3.48 M/UL
SODIUM SERPL-SCNC: 137 MMOL/L
WBC # BLD AUTO: 7.46 K/UL

## 2018-02-13 PROCEDURE — 83735 ASSAY OF MAGNESIUM: CPT

## 2018-02-13 PROCEDURE — 63600175 PHARM REV CODE 636 W HCPCS: Performed by: FAMILY MEDICINE

## 2018-02-13 PROCEDURE — 94761 N-INVAS EAR/PLS OXIMETRY MLT: CPT

## 2018-02-13 PROCEDURE — 80053 COMPREHEN METABOLIC PANEL: CPT

## 2018-02-13 PROCEDURE — 84100 ASSAY OF PHOSPHORUS: CPT

## 2018-02-13 PROCEDURE — 25000003 PHARM REV CODE 250: Performed by: FAMILY MEDICINE

## 2018-02-13 PROCEDURE — 94640 AIRWAY INHALATION TREATMENT: CPT

## 2018-02-13 PROCEDURE — 85025 COMPLETE CBC W/AUTO DIFF WBC: CPT

## 2018-02-13 PROCEDURE — 36415 COLL VENOUS BLD VENIPUNCTURE: CPT

## 2018-02-13 PROCEDURE — 25000242 PHARM REV CODE 250 ALT 637 W/ HCPCS: Performed by: FAMILY MEDICINE

## 2018-02-13 RX ORDER — FUROSEMIDE 10 MG/ML
40 INJECTION INTRAMUSCULAR; INTRAVENOUS ONCE
Status: COMPLETED | OUTPATIENT
Start: 2018-02-13 | End: 2018-02-13

## 2018-02-13 RX ORDER — CETIRIZINE HYDROCHLORIDE 5 MG/1
5 TABLET ORAL DAILY
Status: DISCONTINUED | OUTPATIENT
Start: 2018-02-13 | End: 2018-02-13 | Stop reason: HOSPADM

## 2018-02-13 RX ORDER — FUROSEMIDE 20 MG/1
20 TABLET ORAL DAILY
Qty: 2 TABLET | Refills: 0 | Status: SHIPPED | OUTPATIENT
Start: 2018-02-13 | End: 2021-01-13

## 2018-02-13 RX ORDER — FUROSEMIDE 10 MG/ML
20 INJECTION INTRAMUSCULAR; INTRAVENOUS ONCE
Status: DISCONTINUED | OUTPATIENT
Start: 2018-02-13 | End: 2018-02-13

## 2018-02-13 RX ORDER — FUROSEMIDE 10 MG/ML
20 INJECTION INTRAMUSCULAR; INTRAVENOUS ONCE
Status: COMPLETED | OUTPATIENT
Start: 2018-02-13 | End: 2018-02-13

## 2018-02-13 RX ORDER — FLUTICASONE PROPIONATE 50 MCG
2 SPRAY, SUSPENSION (ML) NASAL DAILY
Status: DISCONTINUED | OUTPATIENT
Start: 2018-02-13 | End: 2018-02-13 | Stop reason: HOSPADM

## 2018-02-13 RX ADMIN — CETIRIZINE HYDROCHLORIDE 5 MG: 5 TABLET, FILM COATED ORAL at 06:02

## 2018-02-13 RX ADMIN — FUROSEMIDE 20 MG: 10 INJECTION, SOLUTION INTRAMUSCULAR; INTRAVENOUS at 01:02

## 2018-02-13 RX ADMIN — LEVOTHYROXINE SODIUM 75 MCG: 75 TABLET ORAL at 06:02

## 2018-02-13 RX ADMIN — IPRATROPIUM BROMIDE AND ALBUTEROL SULFATE 3 ML: .5; 3 SOLUTION RESPIRATORY (INHALATION) at 10:02

## 2018-02-13 RX ADMIN — FLUTICASONE PROPIONATE 100 MCG: 50 SPRAY, METERED NASAL at 06:02

## 2018-02-13 RX ADMIN — GUAIFENESIN AND DEXTROMETHORPHAN HYDROBROMIDE 1 TABLET: 600; 30 TABLET, EXTENDED RELEASE ORAL at 06:02

## 2018-02-13 RX ADMIN — FUROSEMIDE 40 MG: 10 INJECTION, SOLUTION INTRAMUSCULAR; INTRAVENOUS at 11:02

## 2018-02-13 RX ADMIN — SODIUM CHLORIDE: 0.9 INJECTION, SOLUTION INTRAVENOUS at 06:02

## 2018-02-13 RX ADMIN — SODIUM CHLORIDE: 0.9 INJECTION, SOLUTION INTRAVENOUS at 12:02

## 2018-02-13 NOTE — PROGRESS NOTES
At rest:  SpO2: (!) 97 % (at rest) (02/13/18 0908)  O2 Device (Oxygen Therapy): room air (02/13/18 0912)      Ambulation:    SpO2: (!) 93 % (ambulating) (02/13/18 0912)  O2 Device (Oxygen Therapy): room air (02/13/18 0912)

## 2018-02-13 NOTE — PROGRESS NOTES
Pt concerned about being d/c'd d/t her FARIA. MD called and notified. MD stated that he would look into it. Will continue to monitor.

## 2018-02-13 NOTE — PROGRESS NOTES
Pt AAOx4. VSS. NAD noted. Pt denies SOB, FARIA. Discharge teaching given. Pt verbalized understanding. All questions answered. Pt d/c'd per protocol.

## 2018-02-13 NOTE — PROGRESS NOTES
"[facility]    CC: choledocholithiasis    HPI 77 y.o. female h/o gallstone pancreatitis p/w 1 week of intermittent, epigastric, colicky, sharp pain lasting a few hours and resolving starting 6 days ago not a/w changes in bowels or n/v. After a few mild episodes and then another more painful experience on yesterday she presented to the Ed. She is transferred for possible choledocholithiasis. She denies fever, jaundice, or confusion. Pain is improved with dilaudid. NPO since midnight x meds. One year ago she had lap ruby for biliary pancreatitis. She has never had ERCP before and takes no blood thinners except ASA 81 mg.     Interval: Improved Abd pain but FARIA.     Past Medical History    has a past medical history of Encounter for blood transfusion; GERD (gastroesophageal reflux disease); High cholesterol; pancreatitis; and Thyroid disease.      Review of Systems  General ROS: negative for - chills, fever or weight loss  Cardiovascular ROS: no chest pain + dyspnea on exertion  Gastrointestinal ROS: Resolved abd pain. No N/v     Physical Examination  /61 (Patient Position: Lying)   Pulse 80   Temp 97.8 °F (36.6 °C) (Oral)   Resp 20   Ht 4' 11" (1.499 m)   Wt 65.8 kg (145 lb 1 oz)   SpO2 (!) 93%   Breastfeeding? No   BMI 29.30 kg/m²   General appearance: alert, cooperative, no distress  HENT: Normocephalic, atraumatic, neck symmetrical, no nasal discharge   Lungs: clear to auscultation in all fields, symmetric chest wall expansion bilaterally, no wheeze, rale, or rhonchi  Heart: normal rate, regular rhythm without rub; palpable peripheral pulsesI   Abdomen: flat, soft, nontender + BS   Extremities: extremities symmetric; no clubbing, cyanosis, or edema  Neurologic: Alert and oriented X 3, normal strength, normal coordination    Labs:    Recent Labs  Lab 02/13/18  0502   WBC 7.46   RBC 3.48*   HGB 10.8*   HCT 33.0*   *   MCV 95   MCH 31.0   MCHC 32.7        Imaging:  EUS:   - hyperechoic " mass in CBD c/w sludge    Assessment:   77 y.o. h/o gallstone pancreatitis p/w 1 week of intermittent, epigastric, colicky pain s/p EUS with ERCP and sphincterotomy with drainage of purulent fluid. Currently labs indicate relieved obstruction and clinically doing well. FARIA is new complaint.     Plan:  - FARIA mgmt per primary team   - Appropriate for D/c by GI perspective. Will need 10-14 days of Abx. Dr. Smith suggested Augmentin 500 mg PO tid, but deferring to primary team.     Toñito Garcia 8:24 AM 2/13/2018  [facility], PGYIV

## 2018-02-13 NOTE — PROGRESS NOTES
Progress Note    Admit Date: 2/12/2018   LOS: 1 day     SUBJECTIVE:     JAXEON, patient feels some SOB this AM without CP. States she feels fine otherwise and feels that she just needs to get out of the hospital. Tolerating PO without N/V.     Scheduled Meds:   cetirizine  5 mg Oral Daily    famotidine (PF)  20 mg Intravenous BID    fluticasone  2 spray Each Nare Daily    levothyroxine  75 mcg Oral Before breakfast    simvastatin  40 mg Oral QHS     Continuous Infusions:   sodium chloride 0.9% 150 mL/hr at 02/13/18 0607     PRN Meds:albuterol-ipratropium 2.5mg-0.5mg/3mL, dextromethorphan-guaifenesin  mg, dextrose 50%, dextrose 50%, glucagon (human recombinant), glucose, glucose, hydromorphone (PF), insulin aspart, ondansetron, ramelteon, sodium chloride 0.9%    Review of patient's allergies indicates:  No Known Allergies    Review of Systems:  Constitutional: no fever or chills  Eyes: no visual changes  Respiratory: no cough or shortness of breath  Cardiovascular: no chest pain   Genitourinary: +urination     OBJECTIVE:     Vital Signs (Most Recent)  Temp: 97.8 °F (36.6 °C) (02/13/18 0820)  Pulse: 80 (02/13/18 0820)  Resp: 20 (02/13/18 0820)  BP: 134/61 (02/13/18 0820)  SpO2: (!) 93 % (02/13/18 0319)    Vital Signs Range (Last 24H):  Temp:  [96.8 °F (36 °C)-98.3 °F (36.8 °C)]   Pulse:  [70-82]   Resp:  [17-20]   BP: ()/(54-69)   SpO2:  [93 %-98 %]     I & O (Last 24H):  Intake/Output Summary (Last 24 hours) at 02/13/18 0852  Last data filed at 02/13/18 0555   Gross per 24 hour   Intake           3567.5 ml   Output              900 ml   Net           2667.5 ml     Physical Exam:  General: well developed, well nourished  Eyes: conjunctivae/corneas clear.   Lungs:  clear to auscultation bilaterally and normal respiratory effort  Cardiovascular: Heart: regular rate and rhythm, S1, S2 normal, no murmur, click, rub or gallop. Chest Wall: no tenderness.   Abdomen/Rectal: Abdomen: soft, non-tender  non-distented; bowel sounds normal; no masses,  no organomegaly.      Laboratory:  CBC:   Recent Labs  Lab 02/13/18  0502   WBC 7.46   RBC 3.48*   HGB 10.8*   HCT 33.0*   *   MCV 95   MCH 31.0   MCHC 32.7     CMP:   Recent Labs  Lab 02/13/18  0502      CALCIUM 8.2*   ALBUMIN 3.1*   PROT 6.2      K 3.7   CO2 23      BUN 9   CREATININE 0.6   ALKPHOS 197*   *   *   BILITOT 2.2*     Coagulation:   Recent Labs  Lab 02/12/18  0401   LABPROT 11.4   INR 1.1   APTT 23.6     Diagnostic Results:  No new images.     ASSESSMENT/PLAN:     Shanthi Can is a 77 y.o. female who  has a past medical history of Encounter for blood transfusion and GERD (gastroesophageal reflux disease). presents with Choledocholithiasis     Choledocholithiasis  -GI consult this morning  -Worsening LFTs  -Pain control with dilaudid as needed  -IVFs and antiemetics   - s/p ERCP, likely passed stone on own, s/p sphincterotomy  - Monitor SOB this AM, likely 2/2 anesthesia   - f/u amb pulse ox     DVT Prophylaxis   SCD'S     GI prophylaxis  Plan: famotidine for ulcer prevention.      Dispo: Amb pulse ox this AM. If feeling well, may discharge.     2/13/2018 8:54 AM Kyle Crowell M.D.

## 2018-02-13 NOTE — DISCHARGE SUMMARY
[facility]  Discharge Summary      Patient Name: Shanthi Can  MRN: [id number]  Admission Date: 2/12/2018  Hospital Length of Stay: 1 days  Discharge Date and Time:  02/13/2018 12:42 PM  Attending Physician: Hien att. providers found   Discharging Provider: Kyle Crowell MD  Primary Care Provider: Primary Doctor Hien     HPI: Patient is a 76 yo F with a pmhx of GERD, high cholesterol, hypothyroid and pancreatitis that presents with abdominal pain in the epigastric area.  Patient is stating this pain feels like the same pain she had when her gallbladder was removed and she was diagnosed with pancreatitis.  However upon exam patient states pain is only in the epigastric area only.  Patient found to have choledocholithiasis with moderate biliary ductal dilation and transferred to us for GI evaluation.      Procedure(s) (LRB):  ERCP (N/A)  ULTRASOUND-ENDOSCOPIC-UPPER (N/A)     Hospital Course: Patient was admitted with transaminitis, choldedocholithiasis and CBD dilation. GI was consulted and took patient for EUS and ERCP. Upon evaluation, it appeared patient had passed a stone and a sphincterotomy was performed. Patient was monitored overnight and LFT's/bilirubin improved after procedure. Patient did complain of SOB and generalized fatigue. Patient found to have crackles on exam and was determined to be fluid overloaded likely 2/2 IVF. Patient was given 40mg IV lasix with good urine output with a repeat IM dose at 2:00PM. Patient's SOB improved, and patient was deemed stable for discharge with PO abx, 2 doses of PO Lasix 20 mg and close outpatient follow-up. All questions were answered.     Consults:   Consults         Status Ordering Provider     Gastroenterology  Once     Provider:  (Not yet assigned)    Acknowledged AZAEL ORR     Inpatient consult to Anesthesiology  Once     Provider:  (Not yet assigned)    Acknowledged TAMANNA MONTEJO        Significant Diagnostic Studies: Labs:    BMP:     Recent Labs  Lab 02/12/18  0401 02/13/18  0502   * 101    137   K 3.7 3.7    108   CO2 25 23   BUN 10 9   CREATININE 0.7 0.6   CALCIUM 8.5* 8.2*   MG 1.9 2.3   , CBC     Recent Labs  Lab 02/12/18  0401 02/13/18  0502   WBC 11.87 7.46   HGB 11.1* 10.8*   HCT 34.2* 33.0*   * 138*      Results for orders placed or performed during the hospital encounter of 02/12/18   X-Ray Chest 1 View    Narrative    One view: The heart size is normal. There is interstitial edema versus infiltrate aortic plaque and DJD.    Impression     CHF versus interstitial pneumonia.      Electronically signed by: LEODAN BILL MD  Date:     02/13/18  Time:    11:41      Endoscopy: ERCP: see GI note, consistent with passed stone.     Pending Diagnostic Studies:     None        Final Active Diagnoses:    Diagnosis Date Noted POA    PRINCIPAL PROBLEM:  Choledocholithiasis [K80.50] 02/12/2018 Yes      Problems Resolved During this Admission:    Diagnosis Date Noted Date Resolved POA      Discharged Condition: stable    Disposition: Home or Self Care    Follow Up:  Follow-up Information     Elbert Jasmine MD In 2 weeks.    Specialty:  Gastroenterology  Contact information:  [address] [address] [phone]             [facility].    Specialty:  Emergency Medicine  Why:  As needed, If symptoms worsen  Contact information:  [address]  [facility] [address] [phone]               Patient Instructions:     Diet Cardiac     Diet Adult Regular     Activity as tolerated     Notify your health care provider if you experience any of the following:  temperature >100.4     Notify your health care provider if you experience any of the following:  persistent nausea and vomiting or diarrhea     Notify your health care provider if you experience any of the following:  severe uncontrolled pain     Activity as tolerated     Notify your health care provider if  you experience any of the following:  temperature >100.4     Notify your health care provider if you experience any of the following:  persistent nausea and vomiting or diarrhea     Notify your health care provider if you experience any of the following:  redness, tenderness, or signs of infection (pain, swelling, redness, odor or green/yellow discharge around incision site)     Notify your health care provider if you experience any of the following:  difficulty breathing or increased cough     Notify your health care provider if you experience any of the following:  severe persistent headache     Notify your health care provider if you experience any of the following:  persistent dizziness, light-headedness, or visual disturbances       Medications:  Reconciled Home Medications:   Discharge Medication List as of 2/13/2018  4:11 PM      START taking these medications    Details   amoxicillin-clavulanate 500-125mg (AUGMENTIN) 500-125 mg Tab Take 1 tablet (500 mg total) by mouth 3 (three) times daily., Starting Mon 2/12/2018, Until Mon 2/26/2018, Normal      furosemide (LASIX) 20 MG tablet Take 1 tablet (20 mg total) by mouth once daily., Starting Tue 2/13/2018, Until Thu 2/15/2018, Normal         CONTINUE these medications which have NOT CHANGED    Details   levothyroxine (SYNTHROID) 75 MCG tablet Take 75 mcg by mouth once daily., Historical Med           >30 minutes were spent in discharge planning including counseling patient and arranging for follow-ups, discussion of medications and interventions and answering all questions for patient and family.     Kyle Crowell MD  [facility]

## 2018-02-13 NOTE — PLAN OF CARE
Patient has DC order in, however her family member Pavel stated that the patient was having issues.  TN called nurse Leticia [phone] who stated patient is having a CT Scan done for dyspnea.  Patient received medications yesterday for her DC.     02/13/18 3182   Discharge Reassessment   Assessment Type Discharge Planning Reassessment   Discharge Plan A Home

## 2018-02-13 NOTE — PLAN OF CARE
Problem: Patient Care Overview  Goal: Plan of Care Review  Outcome: Ongoing (interventions implemented as appropriate)  AAOX3.  Respirations even and unlabored; breath sounds clear.  RA with saturation 93-95%.  Denies pain and sob.  C/o nausea after eating first regular meal; zofran 4mg iv admin with nausea relief.  Bowel sounds active with no tenderness noted.  Voiding tea colored urine; instructed to increase po fluid intake.  Fall precaution sign on door.  Fall precaution armband on.  Family member at bedside.  Instructed to call for assistance.  Will cont to monitor.

## 2018-02-14 ENCOUNTER — TELEPHONE (OUTPATIENT)
Dept: NEUROLOGY | Facility: HOSPITAL | Age: 78
End: 2018-02-14

## 2018-02-14 NOTE — TELEPHONE ENCOUNTER
----- Message from Rohit Dangelo RN sent at 2/13/2018 11:02 AM CST -----  Patient needs two week follow up, please call her, DC'd on [place] [place] day.  Rohit Dangelo RN  Transitional Navigator/Case Management  [phone]

## 2018-02-14 NOTE — TELEPHONE ENCOUNTER
Phoned patient and discussed the details of the appointment.  Message sent to Dr Jasmine to see if she needed a follow up appointment with him.

## 2018-02-15 ENCOUNTER — TELEPHONE (OUTPATIENT)
Dept: GASTROENTEROLOGY | Facility: CLINIC | Age: 78
End: 2018-02-15

## 2018-02-15 NOTE — TELEPHONE ENCOUNTER
Spoke with pt and was able to get her scheduled for a 2 week f/u appt with [facility] on 3/1/18 at 11:00am. Verbal Understanding. Appt slip mailed out to home address.

## 2018-03-01 ENCOUNTER — OFFICE VISIT (OUTPATIENT)
Dept: GASTROENTEROLOGY | Facility: CLINIC | Age: 78
End: 2018-03-01
Payer: MEDICARE

## 2018-03-01 VITALS
BODY MASS INDEX: 29.08 KG/M2 | DIASTOLIC BLOOD PRESSURE: 66 MMHG | SYSTOLIC BLOOD PRESSURE: 144 MMHG | HEART RATE: 78 BPM | WEIGHT: 144 LBS

## 2018-03-01 DIAGNOSIS — K80.50 CHOLEDOCHOLITHIASIS: Primary | ICD-10-CM

## 2018-03-01 PROCEDURE — 99999 PR PBB SHADOW E&M-EST. PATIENT-LVL II: CPT | Mod: PBBFAC,,,

## 2018-03-01 PROCEDURE — 99214 OFFICE O/P EST MOD 30 MIN: CPT | Mod: S$GLB,,, | Performed by: INTERNAL MEDICINE

## 2018-03-01 RX ORDER — PANTOPRAZOLE SODIUM 40 MG/1
40 TABLET, DELAYED RELEASE ORAL DAILY
COMMUNITY
End: 2021-02-18

## 2018-03-01 RX ORDER — ROSUVASTATIN CALCIUM 20 MG/1
20 TABLET, COATED ORAL DAILY
COMMUNITY
End: 2021-01-13

## 2018-03-01 RX ORDER — LEVOTHYROXINE SODIUM 100 UG/1
100 TABLET ORAL DAILY
COMMUNITY
End: 2021-02-22

## 2018-03-01 NOTE — PROGRESS NOTES
Subjective:       Patient ID: Shanthi Can is a 77 y.o. female.    Chief Complaint: Follow-up (cholangitis)    Ms Can was discharged two weeks ago for acute cholangitis.  She underwent an ERCP by my partner Dr Smith during which she had a sphincterotomy and stone extraction.  No stent was placed.  Since then she's done pretty well.  Sh states she was overhydrated prior to discharge and needed to be on two weeks of Lasix.  She is now t her baseline weight.  She has frequent  Bouts of gas and reflux for which she takes Protonix.  The gas bothers her more than any thing.  She chews a lot of gum and eats vegetable.  She doesn't think she swallows air frequently.    She had her gallbladder removed over a year ago for acute pancreatitis      Review of Systems   All other systems reviewed and are negative.      Objective:      Physical Exam   Constitutional: She appears well-developed and well-nourished.   HENT:   Head: Normocephalic.   Pulmonary/Chest: Effort normal.   Psychiatric: She has a normal mood and affect.       Assessment:       1. Choledocholithiasis        Plan:       She had a successful ERCP and doing well without complications. Her gallbladder has been removed.  She has a lot of gas, we discussed strategies for limiting air intake into there GI tract  No need for further follow up.  She has follow up with her PCP on Monday    We spent 25 minutes during this clinic visit, greater than 50% of the time was dedicated to counseling her on the proper management of her gaseous symptoms as well as managing her Lasix.

## 2018-03-07 PROBLEM — R10.9 ABDOMINAL PAIN: Status: ACTIVE | Noted: 2018-03-07

## 2021-01-09 ENCOUNTER — IMMUNIZATION (OUTPATIENT)
Dept: OBSTETRICS AND GYNECOLOGY | Facility: CLINIC | Age: 81
End: 2021-01-09
Payer: MEDICARE

## 2021-01-09 DIAGNOSIS — Z23 NEED FOR VACCINATION: ICD-10-CM

## 2021-01-09 PROCEDURE — 91300 COVID-19, MRNA, LNP-S, PF, 30 MCG/0.3 ML DOSE VACCINE: CPT | Mod: PBBFAC | Performed by: ANESTHESIOLOGY

## 2021-01-13 ENCOUNTER — OFFICE VISIT (OUTPATIENT)
Dept: OBSTETRICS AND GYNECOLOGY | Facility: CLINIC | Age: 81
End: 2021-01-13
Payer: MEDICARE

## 2021-01-13 VITALS
DIASTOLIC BLOOD PRESSURE: 68 MMHG | SYSTOLIC BLOOD PRESSURE: 128 MMHG | BODY MASS INDEX: 30.24 KG/M2 | WEIGHT: 150 LBS | HEART RATE: 72 BPM | HEIGHT: 59 IN

## 2021-01-13 DIAGNOSIS — Z12.31 SCREENING MAMMOGRAM, ENCOUNTER FOR: Primary | ICD-10-CM

## 2021-01-13 DIAGNOSIS — M85.80 OSTEOPENIA, UNSPECIFIED LOCATION: ICD-10-CM

## 2021-01-13 DIAGNOSIS — N81.11 CYSTOCELE, MIDLINE: ICD-10-CM

## 2021-01-13 DIAGNOSIS — Z01.419 ENCOUNTER FOR ANNUAL ROUTINE GYNECOLOGICAL EXAMINATION: ICD-10-CM

## 2021-01-13 DIAGNOSIS — M85.89 OTHER SPECIFIED DISORDERS OF BONE DENSITY AND STRUCTURE, MULTIPLE SITES: ICD-10-CM

## 2021-01-13 PROCEDURE — 3288F PR FALLS RISK ASSESSMENT DOCUMENTED: ICD-10-PCS | Mod: CPTII,S$GLB,, | Performed by: OBSTETRICS & GYNECOLOGY

## 2021-01-13 PROCEDURE — 3288F FALL RISK ASSESSMENT DOCD: CPT | Mod: CPTII,S$GLB,, | Performed by: OBSTETRICS & GYNECOLOGY

## 2021-01-13 PROCEDURE — G0101 CA SCREEN;PELVIC/BREAST EXAM: HCPCS | Mod: S$GLB,,, | Performed by: OBSTETRICS & GYNECOLOGY

## 2021-01-13 PROCEDURE — G0101 PR CA SCREEN;PELVIC/BREAST EXAM: ICD-10-PCS | Mod: S$GLB,,, | Performed by: OBSTETRICS & GYNECOLOGY

## 2021-01-13 PROCEDURE — 99999 PR PBB SHADOW E&M-EST. PATIENT-LVL IV: CPT | Mod: PBBFAC,,, | Performed by: OBSTETRICS & GYNECOLOGY

## 2021-01-13 PROCEDURE — 1101F PT FALLS ASSESS-DOCD LE1/YR: CPT | Mod: CPTII,S$GLB,, | Performed by: OBSTETRICS & GYNECOLOGY

## 2021-01-13 PROCEDURE — 99999 PR PBB SHADOW E&M-EST. PATIENT-LVL IV: ICD-10-PCS | Mod: PBBFAC,,, | Performed by: OBSTETRICS & GYNECOLOGY

## 2021-01-13 PROCEDURE — 1101F PR PT FALLS ASSESS DOC 0-1 FALLS W/OUT INJ PAST YR: ICD-10-PCS | Mod: CPTII,S$GLB,, | Performed by: OBSTETRICS & GYNECOLOGY

## 2021-01-13 RX ORDER — UBIDECARENONE 30 MG
30 CAPSULE ORAL 3 TIMES DAILY
COMMUNITY
End: 2021-12-22

## 2021-01-13 RX ORDER — ASPIRIN 81 MG/1
81 TABLET ORAL DAILY
COMMUNITY

## 2021-01-13 RX ORDER — GLUCOSAMINE/CHONDRO SU A 500-400 MG
1 TABLET ORAL 3 TIMES DAILY
COMMUNITY

## 2021-01-13 RX ORDER — DIPHENHYDRAMINE HCL 25 MG
25 TABLET ORAL NIGHTLY PRN
COMMUNITY
End: 2022-04-19

## 2021-01-13 RX ORDER — ROSUVASTATIN CALCIUM 40 MG/1
TABLET, COATED ORAL
COMMUNITY
Start: 2020-12-29 | End: 2021-02-22

## 2021-01-13 RX ORDER — ASCORBIC ACID 500 MG
500 TABLET ORAL DAILY
COMMUNITY

## 2021-01-13 RX ORDER — GABAPENTIN 100 MG/1
CAPSULE ORAL
COMMUNITY
Start: 2020-12-29 | End: 2021-05-14 | Stop reason: SDUPTHER

## 2021-01-13 RX ORDER — FLUTICASONE PROPIONATE 50 MCG
SPRAY, SUSPENSION (ML) NASAL
COMMUNITY
Start: 2020-12-30 | End: 2021-02-24

## 2021-01-13 RX ORDER — CALCIUM CARBONATE 600 MG
600 TABLET ORAL ONCE
COMMUNITY
End: 2023-05-16 | Stop reason: SDUPTHER

## 2021-01-13 RX ORDER — CHOLECALCIFEROL (VITAMIN D3) 50 MCG
CAPSULE ORAL
COMMUNITY

## 2021-01-13 RX ORDER — FLUTICASONE FUROATE AND VILANTEROL TRIFENATATE 100; 25 UG/1; UG/1
POWDER RESPIRATORY (INHALATION)
COMMUNITY
Start: 2020-11-12 | End: 2021-02-09 | Stop reason: SDUPTHER

## 2021-01-30 ENCOUNTER — IMMUNIZATION (OUTPATIENT)
Dept: OBSTETRICS AND GYNECOLOGY | Facility: CLINIC | Age: 81
End: 2021-01-30
Payer: MEDICARE

## 2021-01-30 DIAGNOSIS — Z23 NEED FOR VACCINATION: Primary | ICD-10-CM

## 2021-01-30 PROCEDURE — 0002A COVID-19, MRNA, LNP-S, PF, 30 MCG/0.3 ML DOSE VACCINE: CPT | Mod: PBBFAC | Performed by: ANESTHESIOLOGY

## 2021-01-30 PROCEDURE — 91300 COVID-19, MRNA, LNP-S, PF, 30 MCG/0.3 ML DOSE VACCINE: CPT | Mod: PBBFAC | Performed by: ANESTHESIOLOGY

## 2021-02-17 ENCOUNTER — HOSPITAL ENCOUNTER (OUTPATIENT)
Dept: RADIOLOGY | Facility: HOSPITAL | Age: 81
Discharge: HOME | End: 2021-02-17
Attending: OBSTETRICS & GYNECOLOGY
Payer: MEDICARE

## 2021-02-17 VITALS — BODY MASS INDEX: 30.24 KG/M2 | WEIGHT: 150 LBS | HEIGHT: 59 IN

## 2021-02-17 DIAGNOSIS — M85.80 OSTEOPENIA, UNSPECIFIED LOCATION: ICD-10-CM

## 2021-02-17 DIAGNOSIS — M85.89 OTHER SPECIFIED DISORDERS OF BONE DENSITY AND STRUCTURE, MULTIPLE SITES: ICD-10-CM

## 2021-02-17 DIAGNOSIS — Z12.31 SCREENING MAMMOGRAM, ENCOUNTER FOR: ICD-10-CM

## 2021-02-17 PROCEDURE — 77067 SCR MAMMO BI INCL CAD: CPT | Mod: TC

## 2021-02-17 PROCEDURE — 77080 DXA BONE DENSITY AXIAL: CPT | Mod: TC

## 2021-04-15 PROBLEM — M62.838 MUSCLE SPASM: Status: ACTIVE | Noted: 2021-04-15

## 2021-04-15 PROBLEM — E78.5 HYPERLIPIDEMIA: Status: ACTIVE | Noted: 2021-04-15

## 2021-04-22 ENCOUNTER — OFFICE VISIT (OUTPATIENT)
Dept: OBSTETRICS AND GYNECOLOGY | Facility: CLINIC | Age: 81
End: 2021-04-22
Payer: MEDICARE

## 2021-04-22 VITALS
WEIGHT: 152 LBS | HEART RATE: 78 BPM | SYSTOLIC BLOOD PRESSURE: 165 MMHG | HEIGHT: 59 IN | DIASTOLIC BLOOD PRESSURE: 68 MMHG | BODY MASS INDEX: 30.64 KG/M2

## 2021-04-22 DIAGNOSIS — N81.11 CYSTOCELE, MIDLINE: Primary | ICD-10-CM

## 2021-04-22 DIAGNOSIS — N81.10 PROLAPSE OF ANTERIOR VAGINAL WALL: ICD-10-CM

## 2021-04-22 PROCEDURE — 99213 OFFICE O/P EST LOW 20 MIN: CPT | Mod: S$GLB,,, | Performed by: OBSTETRICS & GYNECOLOGY

## 2021-04-22 PROCEDURE — 1159F PR MEDICATION LIST DOCUMENTED IN MEDICAL RECORD: ICD-10-PCS | Mod: S$GLB,,, | Performed by: OBSTETRICS & GYNECOLOGY

## 2021-04-22 PROCEDURE — 1159F MED LIST DOCD IN RCRD: CPT | Mod: S$GLB,,, | Performed by: OBSTETRICS & GYNECOLOGY

## 2021-04-22 PROCEDURE — 99999 PR PBB SHADOW E&M-EST. PATIENT-LVL III: ICD-10-PCS | Mod: PBBFAC,,, | Performed by: OBSTETRICS & GYNECOLOGY

## 2021-04-22 PROCEDURE — 99213 PR OFFICE/OUTPT VISIT, EST, LEVL III, 20-29 MIN: ICD-10-PCS | Mod: S$GLB,,, | Performed by: OBSTETRICS & GYNECOLOGY

## 2021-04-22 PROCEDURE — 99999 PR PBB SHADOW E&M-EST. PATIENT-LVL III: CPT | Mod: PBBFAC,,, | Performed by: OBSTETRICS & GYNECOLOGY

## 2021-04-22 RX ORDER — AZELASTINE HCL 205.5 UG/1
SPRAY NASAL
COMMUNITY
Start: 2021-01-13 | End: 2024-02-02

## 2021-06-23 PROBLEM — E03.9 HYPOTHYROIDISM: Status: ACTIVE | Noted: 2021-06-23

## 2021-06-23 PROBLEM — I10 ESSENTIAL HYPERTENSION: Status: ACTIVE | Noted: 2021-06-23

## 2021-06-23 PROBLEM — R73.01 IFG (IMPAIRED FASTING GLUCOSE): Status: ACTIVE | Noted: 2021-06-23

## 2021-06-23 PROBLEM — I65.23 BILATERAL CAROTID ARTERY STENOSIS: Status: ACTIVE | Noted: 2021-06-23

## 2021-10-12 ENCOUNTER — IMMUNIZATION (OUTPATIENT)
Dept: OBSTETRICS AND GYNECOLOGY | Facility: CLINIC | Age: 81
End: 2021-10-12
Payer: MEDICARE

## 2021-10-12 DIAGNOSIS — Z23 NEED FOR VACCINATION: Primary | ICD-10-CM

## 2021-10-12 PROCEDURE — 0003A COVID-19, MRNA, LNP-S, PF, 30 MCG/0.3 ML DOSE VACCINE: CPT | Mod: PBBFAC | Performed by: ANESTHESIOLOGY

## 2021-10-12 PROCEDURE — 91300 COVID-19, MRNA, LNP-S, PF, 30 MCG/0.3 ML DOSE VACCINE: CPT | Mod: PBBFAC | Performed by: ANESTHESIOLOGY

## 2021-12-21 PROBLEM — Z00.00 WELLNESS EXAMINATION: Status: ACTIVE | Noted: 2021-12-21

## 2022-03-25 PROBLEM — R19.4 FREQUENT BOWEL MOVEMENTS: Status: ACTIVE | Noted: 2022-03-25

## 2022-03-28 DIAGNOSIS — Z12.31 BREAST CANCER SCREENING BY MAMMOGRAM: Primary | ICD-10-CM

## 2022-03-28 PROBLEM — Z00.00 WELLNESS EXAMINATION: Status: RESOLVED | Noted: 2021-12-21 | Resolved: 2022-03-28

## 2022-03-29 ENCOUNTER — HOSPITAL ENCOUNTER (OUTPATIENT)
Dept: RADIOLOGY | Facility: HOSPITAL | Age: 82
Discharge: HOME | End: 2022-03-29
Attending: OBSTETRICS & GYNECOLOGY
Payer: MEDICARE

## 2022-03-29 VITALS — WEIGHT: 149 LBS | HEIGHT: 59 IN | BODY MASS INDEX: 30.04 KG/M2

## 2022-03-29 DIAGNOSIS — Z12.31 BREAST CANCER SCREENING BY MAMMOGRAM: ICD-10-CM

## 2022-03-29 PROCEDURE — 77067 SCR MAMMO BI INCL CAD: CPT | Mod: TC

## 2022-04-06 DIAGNOSIS — R19.4 CHANGE IN BOWEL HABITS: Primary | ICD-10-CM

## 2022-04-13 DIAGNOSIS — R19.4 FREQUENT BOWEL MOVEMENTS: Primary | ICD-10-CM

## 2022-04-13 RX ORDER — SODIUM CHLORIDE 0.9 % (FLUSH) 0.9 %
10 SYRINGE (ML) INJECTION
Status: CANCELLED | OUTPATIENT
Start: 2022-04-13

## 2022-04-13 RX ORDER — SODIUM CHLORIDE 9 MG/ML
INJECTION, SOLUTION INTRAVENOUS CONTINUOUS
Status: CANCELLED | OUTPATIENT
Start: 2022-04-13

## 2022-04-13 RX ORDER — LIDOCAINE HYDROCHLORIDE 10 MG/ML
1 INJECTION, SOLUTION EPIDURAL; INFILTRATION; INTRACAUDAL; PERINEURAL ONCE
Status: CANCELLED | OUTPATIENT
Start: 2022-04-13 | End: 2022-04-13

## 2022-04-19 ENCOUNTER — HOSPITAL ENCOUNTER (OUTPATIENT)
Dept: PULMONOLOGY | Facility: HOSPITAL | Age: 82
Discharge: HOME | End: 2022-04-19
Attending: SURGERY
Payer: MEDICARE

## 2022-04-19 ENCOUNTER — HOSPITAL ENCOUNTER (OUTPATIENT)
Dept: PREADMISSION TESTING | Facility: HOSPITAL | Age: 82
Discharge: HOME | End: 2022-04-19
Attending: SURGERY
Payer: MEDICARE

## 2022-04-19 VITALS — BODY MASS INDEX: 29.23 KG/M2 | HEIGHT: 59 IN | WEIGHT: 145 LBS

## 2022-04-19 DIAGNOSIS — R19.4 FREQUENT BOWEL MOVEMENTS: ICD-10-CM

## 2022-04-19 PROCEDURE — 93010 ELECTROCARDIOGRAM REPORT: CPT | Mod: ,,, | Performed by: INTERNAL MEDICINE

## 2022-04-19 PROCEDURE — 93010 EKG 12-LEAD: ICD-10-PCS | Mod: ,,, | Performed by: INTERNAL MEDICINE

## 2022-04-19 PROCEDURE — 93005 ELECTROCARDIOGRAM TRACING: CPT

## 2022-04-19 NOTE — PRE-PROCEDURE INSTRUCTIONS
ABNORMAL EKG-PATIENT STATES SHE SEES DR. BOWERS BUT IT'S BEEN ABOUT 2 YEARS-NEED CARDIAC CLEARANCE

## 2022-04-20 ENCOUNTER — ANESTHESIA EVENT (OUTPATIENT)
Dept: ENDOSCOPY | Facility: HOSPITAL | Age: 82
End: 2022-04-20
Payer: MEDICARE

## 2022-04-20 NOTE — ANESTHESIA PREPROCEDURE EVALUATION
04/20/2022  Shanthi Can is a 81 y.o., female.      Pre-op Assessment    I have reviewed the Patient Summary Reports.    I have reviewed the NPO Status.   I have reviewed the Medications.     Review of Systems  Anesthesia Hx:  No problems with previous Anesthesia  Denies Family Hx of Anesthesia complications.   Denies Personal Hx of Anesthesia complications.   Social:  Former Smoker    Cardiovascular:   Hypertension PVD ECG has been reviewed.    Pulmonary:   COPD Shortness of breath    Renal/:  Renal/ Normal     Hepatic/GI:   GERD    Musculoskeletal:   Arthritis     Neurological:  Neurology Normal    Endocrine:   Diabetes Hypothyroidism      Lab Results   Component Value Date    WBC 8.77 04/19/2022    HGB 12.4 04/19/2022    HCT 37.6 04/19/2022    MCV 89 04/19/2022     04/19/2022     CMP  Sodium   Date Value Ref Range Status   04/19/2022 135 (L) 136 - 145 mmol/L Final     Potassium   Date Value Ref Range Status   04/19/2022 4.6 3.5 - 5.1 mmol/L Final     Chloride   Date Value Ref Range Status   04/19/2022 101 95 - 110 mmol/L Final     CO2   Date Value Ref Range Status   04/19/2022 30 (H) 23 - 29 mmol/L Final     Glucose   Date Value Ref Range Status   04/19/2022 97 70 - 110 mg/dL Final     BUN   Date Value Ref Range Status   04/19/2022 8 8 - 23 mg/dL Final     Creatinine   Date Value Ref Range Status   04/19/2022 0.6 0.5 - 1.4 mg/dL Final     Calcium   Date Value Ref Range Status   04/19/2022 9.2 8.7 - 10.5 mg/dL Final     Total Protein   Date Value Ref Range Status   04/19/2022 7.3 6.0 - 8.4 g/dL Final     Albumin   Date Value Ref Range Status   04/19/2022 3.7 3.5 - 5.2 g/dL Final     Total Bilirubin   Date Value Ref Range Status   04/19/2022 0.4 0.1 - 1.0 mg/dL Final     Comment:     For infants and newborns, interpretation of results should be based  on gestational age, weight and in  agreement with clinical  observations.    Premature Infant recommended reference ranges:  Up to 24 hours.............<8.0 mg/dL  Up to 48 hours............<12.0 mg/dL  3-5 days..................<15.0 mg/dL  6-29 days.................<15.0 mg/dL    For patients on Eltrombopag therapy, use of Dimension [place] TBIL is   not   recommended.       Alkaline Phosphatase   Date Value Ref Range Status   04/19/2022 42 (L) 55 - 135 U/L Final     AST   Date Value Ref Range Status   04/19/2022 22 10 - 40 U/L Final     ALT   Date Value Ref Range Status   04/19/2022 22 10 - 44 U/L Final     Anion Gap   Date Value Ref Range Status   04/19/2022 4 (L) 8 - 16 mmol/L Final     eGFR if    Date Value Ref Range Status   04/19/2022 >60.0 >60 mL/min/1.73 m^2 Final     eGFR if non    Date Value Ref Range Status   04/19/2022 >60.0 >60 mL/min/1.73 m^2 Final     Comment:     Calculation used to obtain the estimated glomerular filtration  rate (eGFR) is the CKD-EPI equation.          Physical Exam  General: Well nourished    Airway:  Mouth Opening: Small, but > 3cm  TM Distance: Normal  Tongue: Normal  Neck ROM: Normal ROM    Chest/Lungs:  Clear to auscultation    Heart:  Rate: Normal  Rhythm: Regular Rhythm  Sounds: Normal        Anesthesia Plan  Type of Anesthesia, risks & benefits discussed:    Anesthesia Type: MAC  Intra-op Monitoring Plan: Standard ASA Monitors  Post Op Pain Control Plan: multimodal analgesia  Induction:  IV  Airway Plan: Direct  Informed Consent: Informed consent signed with the Patient and all parties understand the risks and agree with anesthesia plan.  All questions answered.   ASA Score: 2  Day of Surgery Review of History & Physical: I have interviewed and examined the patient. I have reviewed the patient's H&P dated: There are no significant changes.     Ready For Surgery From Anesthesia Perspective.     .

## 2022-04-24 NOTE — H&P
[facility]  General Surgery  History & Physical    Patient Name: Shanthi Can  MRN: [id number]  Admission Date: (Not on file)  Attending Physician: Te Escalante MD   Primary Care Provider: Mika Rascon Jr, MD    Patient information was obtained from patient and past medical records.     Subjective:     Chief Complaint/Reason for Admission:  Change in bowel habits    History of Present Illness:  Patient is a 81 y.o. female who was referred to us because of a change in bowel habits.  The patient describes increasing frequency of her stools but denies any bleeding or fevers.  The patient has some minimal discomfort because of cramping but no other symptoms.  She has a past history of a colonoscopy in 2015 because of similar complaints.  At the time of the colonoscopy, he was found to have a small polyp in the cecum that was removed and the pathology report showed benign tubular adenoma.  She is now being admitted for a colonoscopy.    No current facility-administered medications on file prior to encounter.     Current Outpatient Medications on File Prior to Encounter   Medication Sig    ascorbic acid, vitamin C, (VITAMIN C) 500 MG tablet Take 500 mg by mouth once daily.    aspirin (ECOTRIN) 81 MG EC tablet Take 81 mg by mouth once daily. STOPPED 04/19/2022    azelastine 0.15 % (205.5 mcg) Spry     BREO ELLIPTA 100-25 mcg/dose diskus inhaler INHALE 1 PUFF EVERY DAY    calcium carbonate (OS-SARA) 600 mg calcium (1,500 mg) Tab Take 600 mg by mouth once.    coQ10, ubiquinol, 100 mg Cap Take 200 mg by mouth every evening.    fluticasone propionate (FLONASE) 50 mcg/actuation nasal spray USE 1 SPRAY IN EACH NOSTRIL EVERY DAY    gabapentin (NEURONTIN) 100 MG capsule TAKE 1 CAPSULE TWICE DAILY    glucosamine-chondroitin 500-400 mg tablet Take 1 tablet by mouth 3 (three) times daily.    levothyroxine (SYNTHROID) 100 MCG tablet TAKE 1 TABLET EVERY DAY    magnesium 250 mg Tab Take 400 mg by mouth  every morning. 250mg QAM and 500mg QHS    multivitamin capsule Take 1 capsule by mouth once daily.    omega 3-dha-epa-fish oil (FISH OIL) 100-160-1,000 mg Cap Take by mouth.    pantoprazole (PROTONIX) 40 MG tablet TAKE 1 TABLET EVERY DAY    potassium 99 mg Tab Take by mouth once.    rosuvastatin (CRESTOR) 40 MG Tab TAKE 1 TABLET EVERY DAY       Review of patient's allergies indicates:  No Known Allergies    Past Medical History:   Diagnosis Date    Arthritis     Change in bowel habit     COPD (chronic obstructive pulmonary disease)     Dyspnea     GERD (gastroesophageal reflux disease)     High cholesterol     Hx of pancreatitis     Hypertension     IFG (impaired fasting glucose)     Occlusion and stenosis of unspecified carotid artery     SOB (shortness of breath)     Thyroid disease     hypothyroidism     Past Surgical History:   Procedure Laterality Date    CHOLECYSTECTOMY      COLONOSCOPY      DILATION AND CURETTAGE OF UTERUS      TENDON RELEASE Bilateral     GREAT TOES    TONSILLECTOMY       Family History     Problem Relation (Age of Onset)    Brain cancer Sister (50)    COPD Sister (49)    Diabetes Mother, Brother    Heart failure Mother (78)    Hypertension Father    Lung cancer Father (68), Sister, Brother (75)    No Known Problems Sister        Tobacco Use    Smoking status: Former Smoker     Packs/day: 1.00     Years: 20.00     Pack years: 20.00     Types: Cigarettes     Quit date:      Years since quittin.3    Smokeless tobacco: Never Used   Substance and Sexual Activity    Alcohol use: Yes     Comment: OCCASSIONAL    Drug use: No    Sexual activity: Not Currently     Comment:      Review of Systems   Gastrointestinal: Negative for abdominal distention, abdominal pain and anal bleeding.        Change in bowel habits   All other systems reviewed and are negative.    Objective:     Vital Signs (Most Recent):    Vital Signs (24h Range):           There is no  height or weight on file to calculate BMI.    Physical Exam  Constitutional:       Appearance: Normal appearance.   HENT:      Head: Normocephalic.      Nose: Nose normal.      Mouth/Throat:      Mouth: Mucous membranes are moist.   Eyes:      Extraocular Movements: Extraocular movements intact.   Cardiovascular:      Rate and Rhythm: Normal rate and regular rhythm.   Pulmonary:      Effort: Pulmonary effort is normal.      Breath sounds: Normal breath sounds.   Abdominal:      General: Abdomen is flat. There is no distension.      Palpations: Abdomen is soft. There is no mass.      Tenderness: There is no abdominal tenderness.   Musculoskeletal:         General: Normal range of motion.      Cervical back: Neck supple.   Lymphadenopathy:      Cervical: No cervical adenopathy.   Skin:     General: Skin is warm and dry.      Coloration: Skin is not jaundiced.   Neurological:      General: No focal deficit present.      Mental Status: She is alert and oriented to person, place, and time.   Psychiatric:         Mood and Affect: Mood normal.         Behavior: Behavior normal.         Significant Labs:  I have reviewed all pertinent lab results within the past 24 hours.    Significant Diagnostics:  I have reviewed all pertinent imaging results/findings within the past 24 hours.    Assessment/Plan:     Active Diagnoses:    Diagnosis Date Noted POA    PRINCIPAL PROBLEM:  Change in bowel habit [R19.4] 03/25/2022 Unknown      Problems Resolved During this Admission:     VTE Risk Mitigation (From admission, onward)    None          Te Escalante MD  General Surgery  [place] - Endoscopy

## 2022-04-25 ENCOUNTER — ANESTHESIA (OUTPATIENT)
Dept: ENDOSCOPY | Facility: HOSPITAL | Age: 82
End: 2022-04-25
Payer: MEDICARE

## 2022-04-25 ENCOUNTER — HOSPITAL ENCOUNTER (OUTPATIENT)
Facility: HOSPITAL | Age: 82
Discharge: HOME | End: 2022-04-25
Attending: SURGERY | Admitting: SURGERY
Payer: MEDICARE

## 2022-04-25 VITALS
RESPIRATION RATE: 18 BRPM | DIASTOLIC BLOOD PRESSURE: 59 MMHG | HEART RATE: 77 BPM | OXYGEN SATURATION: 97 % | SYSTOLIC BLOOD PRESSURE: 128 MMHG | TEMPERATURE: 98 F

## 2022-04-25 DIAGNOSIS — R19.4 CHANGE IN BOWEL HABIT: Primary | ICD-10-CM

## 2022-04-25 DIAGNOSIS — K57.30 DIVERTICULOSIS OF SIGMOID COLON: Chronic | ICD-10-CM

## 2022-04-25 DIAGNOSIS — K63.5 POLYP OF ASCENDING COLON, UNSPECIFIED TYPE: ICD-10-CM

## 2022-04-25 DIAGNOSIS — R19.4 FREQUENT BOWEL MOVEMENTS: ICD-10-CM

## 2022-04-25 PROCEDURE — 27201423 OPTIME MED/SURG SUP & DEVICES STERILE SUPPLY: Performed by: SURGERY

## 2022-04-25 PROCEDURE — 37000008 HC ANESTHESIA 1ST 15 MINUTES: Performed by: SURGERY

## 2022-04-25 PROCEDURE — 25000003 PHARM REV CODE 250: Performed by: SURGERY

## 2022-04-25 PROCEDURE — 45380 COLONOSCOPY AND BIOPSY: CPT | Performed by: SURGERY

## 2022-04-25 PROCEDURE — 37000009 HC ANESTHESIA EA ADD 15 MINS: Performed by: SURGERY

## 2022-04-25 RX ORDER — PROPOFOL 10 MG/ML
VIAL (ML) INTRAVENOUS
Status: DISCONTINUED | OUTPATIENT
Start: 2022-04-25 | End: 2022-04-25

## 2022-04-25 RX ORDER — LIDOCAINE HYDROCHLORIDE 10 MG/ML
INJECTION, SOLUTION INTRAVENOUS
Status: DISCONTINUED | OUTPATIENT
Start: 2022-04-25 | End: 2022-04-25

## 2022-04-25 RX ORDER — LIDOCAINE HYDROCHLORIDE 10 MG/ML
1 INJECTION, SOLUTION EPIDURAL; INFILTRATION; INTRACAUDAL; PERINEURAL ONCE
Status: DISCONTINUED | OUTPATIENT
Start: 2022-04-25 | End: 2022-04-25 | Stop reason: HOSPADM

## 2022-04-25 RX ORDER — SODIUM CHLORIDE 9 MG/ML
INJECTION, SOLUTION INTRAVENOUS CONTINUOUS
Status: DISCONTINUED | OUTPATIENT
Start: 2022-04-25 | End: 2022-04-25 | Stop reason: HOSPADM

## 2022-04-25 RX ORDER — SODIUM CHLORIDE 0.9 % (FLUSH) 0.9 %
10 SYRINGE (ML) INJECTION
Status: DISCONTINUED | OUTPATIENT
Start: 2022-04-25 | End: 2022-04-25 | Stop reason: HOSPADM

## 2022-04-25 RX ADMIN — Medication 40 MG: at 07:04

## 2022-04-25 RX ADMIN — Medication 30 MG: at 07:04

## 2022-04-25 RX ADMIN — Medication 30 MG: at 08:04

## 2022-04-25 RX ADMIN — Medication 80 MG: at 07:04

## 2022-04-25 RX ADMIN — SODIUM CHLORIDE: 0.9 INJECTION, SOLUTION INTRAVENOUS at 06:04

## 2022-04-25 RX ADMIN — LIDOCAINE HYDROCHLORIDE 50 MG: 10 INJECTION, SOLUTION INTRAVENOUS at 07:04

## 2022-04-25 NOTE — PLAN OF CARE
RECEIVED PT TO ROOM 519 ACCOMPANIED BY JAROD,RN, PT AA&OX3, NO C/O, SNACK SERVED. CALL BELL IN REACH. CALL WITH NEEDS.

## 2022-04-25 NOTE — ANESTHESIA POSTPROCEDURE EVALUATION
Anesthesia Post Evaluation    Patient: Shanthi Can    Procedure(s) Performed: Procedure(s) (LRB):  COLONOSCOPY, WITH POLYPECTOMY USING BIOPSY FORCEPS (N/A)    Final Anesthesia Type: MAC      Patient location during evaluation: OPS  Patient participation: Yes- Able to Participate  Level of consciousness: awake  Post-procedure vital signs: reviewed and stable  Pain management: adequate  Airway patency: patent    PONV status at discharge: No PONV  Anesthetic complications: no      Cardiovascular status: blood pressure returned to baseline  Respiratory status: spontaneous ventilation  Hydration status: euvolemic  Follow-up not needed.          Vitals Value Taken Time   /59 04/25/22 0830   Temp 36.4 °C (97.5 °F) 04/25/22 0830   Pulse 77 04/25/22 0830   Resp 18 04/25/22 0830   SpO2 97 % 04/25/22 0830         No case tracking events are documented in the log.      Pain/Deya Score: Deya Score: 10 (4/25/2022  8:30 AM)

## 2022-04-25 NOTE — OP NOTE
[place] [facility]  Colonoscopy Procedure  Operative Note    SUMMARY     Date of Procedure: 4/25/2022     Procedure: Procedure(s) (LRB):  COLONOSCOPY, WITH POLYPECTOMY USING BIOPSY FORCEPS (N/A)    Surgeon(s) and Role:     * Te Escalante MD - Primary    Assisting Surgeon: None     Patient location: PACU    Pre-Operative Diagnosis: Change in bowel habits [R19.4]    Post-Operative Diagnosis: Post-Op Diagnosis Codes:     * Change in bowel habits [R19.4]     * Diverticulosis of sigmoid colon [K57.30]     * Polyp of ascending colon [K63.5]     Indications:  Change in bowel habits     Anesthesia:  Mac        Procedure:                  The patient was brought in to the endoscopy suite where the risks, benefits, and alternatives of the procedure were described.  The patient was given the opportunity to ask questions and then signed informed consent.  Patient was positioned in the left lateral decubitus position, continuous monitoring was initiated, and supplemental oxygen was provided via nasal cannula.  Adequate sedation was achieved with the above mentioned medications and then titrated during the entire procedure.  Digital rectal exam was performed.  Under direct visualization the colonoscope was introduced through the anus in to the rectum.  The scope was then advanced to the terminal ileum.  Scope was then withdrawn and the mucosa was carefully examined in a circular fashion.  The entire colonic mucosa was examined, including the rectum with retroflexion.  Air was evacuated from the colon and the procedure was terminated.  The patient tolerated the procedure well and was able to be transferred to the recovery area in stable condition.    Findings:                 Digital rectal examination:  Rectal exam showed normal sphincter tone with no masses.  There was no blood on the glove.                      Rectum:  Rectal mucosa appeared unremarkable                    Sigmoid:  Sigmoid colon showed severe  diverticulosis and tortuosity with an area that was difficult to go around but after much trying, we were able to do that and proceeded towards the cecum.        Descending:  Descending colon was unremarkable         Transverse:  Transverse colon showed no abnormalities         Ascending:  Ascending colon showed a small polyp just above the cecum which was removed via cold biopsy forceps.        Cecum:  Cecal mucosa appeared unremarkable.  Ileocecal valve and appendiceal orifice were normal.  We entered into the terminal ileum.          Terminal Ileum:  Terminal ileum mucosa was normal.     Specimens:   Specimens (From admission, onward)    None            Estimated Blood Loss (EBL): * No values recorded between 4/25/2022 12:00 AM and 4/25/2022  8:03 AM *     Complications: No     Diagnostic Impression:  1. Change in bowel habits 2. Severe diverticulosis 3. Polyp in proximal ascending colon     Recommendations: Discharge patient to home.    Disposition: PACU - hemodynamically stable.     Attestation: I performed the procedure.        Follow Up:             Future Appointments   Date Time Provider Department Center   5/6/2022 10:30 AM ULTRASOUND, [facility] CARDIOLOGY [facility]   5/10/2022  1:00 PM NURSE, [facility] INTERNAL MEDICINE [facility]   5/12/2022 11:00 AM ULTRASOUND, [facility]ARDIOLOGY [facility]   5/12/2022 11:15 AM [facility] Jr[facility]           Te Escalante MD  4/25/2022

## 2022-04-25 NOTE — TRANSFER OF CARE
Anesthesia Transfer of Care Note    Patient: Shanthi Can    Procedure(s) Performed: Procedure(s) (LRB):  COLONOSCOPY, WITH POLYPECTOMY USING BIOPSY FORCEPS (N/A)    Patient location: GI    Anesthesia Type: MAC    Transport from OR: Transported from OR on room air with adequate spontaneous ventilation    Post pain: adequate analgesia    Post assessment: no apparent anesthetic complications    Post vital signs: stable    Level of consciousness: awake    Nausea/Vomiting: no nausea/vomiting    Complications: none    Transfer of care protocol was followed      Last vitals:   HR 79  RR 16  SPO2 100  T 36.7  /59

## 2022-04-25 NOTE — DISCHARGE SUMMARY
[place] - Endoscopy  Discharge Note  Short Stay    Procedure(s) (LRB):  COLONOSCOPY, WITH POLYPECTOMY USING BIOPSY FORCEPS (N/A)    OUTCOME: Patient tolerated treatment/procedure well without complication and is now ready for discharge.    DISPOSITION: Home or Self Care    FINAL DIAGNOSIS:  1. Change in bowel habit 2. Sigmoid diverticulosis 3. Polyp of proximal ascending colon    FOLLOWUP: Patient is to follow up in clinic in 1 week for regular checkup and pathology report    DISCHARGE INSTRUCTIONS:    Discharge Procedure Orders   Diet Adult Regular   Order Comments: High-fiber diet     Notify your health care provider if you experience any of the following:  temperature >100.4     Notify your health care provider if you experience any of the following:  persistent nausea and vomiting or diarrhea     Notify your health care provider if you experience any of the following:  severe uncontrolled pain     Activity as tolerated        TIME SPENT ON DISCHARGE:  15 minutes

## 2022-04-29 LAB — SPECIMEN TO PATHOLOGY - SURGICAL: NORMAL

## 2022-06-15 ENCOUNTER — OFFICE VISIT (OUTPATIENT)
Dept: OBSTETRICS AND GYNECOLOGY | Facility: CLINIC | Age: 82
End: 2022-06-15
Payer: MEDICARE

## 2022-06-15 VITALS — WEIGHT: 149 LBS | DIASTOLIC BLOOD PRESSURE: 86 MMHG | SYSTOLIC BLOOD PRESSURE: 123 MMHG | BODY MASS INDEX: 30.09 KG/M2

## 2022-06-15 DIAGNOSIS — N81.11 CYSTOCELE, MIDLINE: Primary | ICD-10-CM

## 2022-06-15 DIAGNOSIS — N81.10 PROLAPSE OF ANTERIOR VAGINAL WALL: ICD-10-CM

## 2022-06-15 DIAGNOSIS — N39.3 STRESS INCONTINENCE: ICD-10-CM

## 2022-06-15 PROCEDURE — 99999 PR PBB SHADOW E&M-EST. PATIENT-LVL III: ICD-10-PCS | Mod: PBBFAC,,, | Performed by: OBSTETRICS & GYNECOLOGY

## 2022-06-15 PROCEDURE — 3074F PR MOST RECENT SYSTOLIC BLOOD PRESSURE < 130 MM HG: ICD-10-PCS | Mod: CPTII,S$GLB,, | Performed by: OBSTETRICS & GYNECOLOGY

## 2022-06-15 PROCEDURE — 1159F MED LIST DOCD IN RCRD: CPT | Mod: CPTII,S$GLB,, | Performed by: OBSTETRICS & GYNECOLOGY

## 2022-06-15 PROCEDURE — 99999 PR PBB SHADOW E&M-EST. PATIENT-LVL III: CPT | Mod: PBBFAC,,, | Performed by: OBSTETRICS & GYNECOLOGY

## 2022-06-15 PROCEDURE — 3079F DIAST BP 80-89 MM HG: CPT | Mod: CPTII,S$GLB,, | Performed by: OBSTETRICS & GYNECOLOGY

## 2022-06-15 PROCEDURE — 99213 OFFICE O/P EST LOW 20 MIN: CPT | Mod: S$GLB,,, | Performed by: OBSTETRICS & GYNECOLOGY

## 2022-06-15 PROCEDURE — 1160F PR REVIEW ALL MEDS BY PRESCRIBER/CLIN PHARMACIST DOCUMENTED: ICD-10-PCS | Mod: CPTII,S$GLB,, | Performed by: OBSTETRICS & GYNECOLOGY

## 2022-06-15 PROCEDURE — 3074F SYST BP LT 130 MM HG: CPT | Mod: CPTII,S$GLB,, | Performed by: OBSTETRICS & GYNECOLOGY

## 2022-06-15 PROCEDURE — 1159F PR MEDICATION LIST DOCUMENTED IN MEDICAL RECORD: ICD-10-PCS | Mod: CPTII,S$GLB,, | Performed by: OBSTETRICS & GYNECOLOGY

## 2022-06-15 PROCEDURE — 1160F RVW MEDS BY RX/DR IN RCRD: CPT | Mod: CPTII,S$GLB,, | Performed by: OBSTETRICS & GYNECOLOGY

## 2022-06-15 PROCEDURE — 3079F PR MOST RECENT DIASTOLIC BLOOD PRESSURE 80-89 MM HG: ICD-10-PCS | Mod: CPTII,S$GLB,, | Performed by: OBSTETRICS & GYNECOLOGY

## 2022-06-15 PROCEDURE — 99213 PR OFFICE/OUTPT VISIT, EST, LEVL III, 20-29 MIN: ICD-10-PCS | Mod: S$GLB,,, | Performed by: OBSTETRICS & GYNECOLOGY

## 2022-06-15 NOTE — PROGRESS NOTES
Subjective:    Patient ID: Shanthi Can is a 81 y.o. y.o. female    Chief Complaint:   Chief Complaint   Patient presents with    Female  Problem       History of Present Illness:  Shanthi presents today for discussion of options regarding management of her cystocele. She states that she doesn't notice the bulge getting bigger. She just notes that she feels the pressure a bit more. She does note some stress incontinence      Review of Systems   Constitutional: Negative for chills, fatigue and fever.   Respiratory: Negative for shortness of breath.    Cardiovascular: Negative for chest pain.   Gastrointestinal: Negative for abdominal pain, constipation, diarrhea and nausea.   Genitourinary: Negative for bladder incontinence, dysuria, hot flashes, pelvic pain and vaginal bleeding.   Neurological: Negative for headaches.   Psychiatric/Behavioral: Negative for depression.         Objective:    Vital Signs:  Vitals:    06/15/22 1151   BP: 123/86     Wt Readings from Last 1 Encounters:   06/15/22 67.6 kg (149 lb)     Body mass index is 30.09 kg/m².    Physical Exam:  General:  alert, no distress   Pelvis: External genitalia: normal general appearance  Urinary system: urethral meatus normal, bladder nontender  Vaginal: cystocele present, grade 2  Cervix: normal appearance  Uterus: normal single, nontender  Adnexa: normal bimanual exam     Pessary:  Patient fitted with 4. Ring pessary.  Able to tolerate.  No expulsion with extreme Valsalva.       Discussed management options including, but not limited to, expectant management, pessary use, or surgery.  She is interested in pessary use at this time.    I spent a total of 20 minutes on the day of the visit.  This includes face to face time and non-face to face time preparing to see the patient (eg, review of tests), obtaining and/or reviewing separately obtained history, documenting clinical information in the electronic or other health record, independently  interpreting results and communicating results to the patient/family/caregiver, or care coordinator.        Assessment:      1. Cystocele, midline    2. Prolapse of anterior vaginal wall    3. Stress incontinence          Plan:      Cystocele, midline  -     Pessary device    Prolapse of anterior vaginal wall  -     Pessary device    Stress incontinence  -     Pessary device    Return to clinic for pessary placement once it arrives       Karen Mota MD, FACOG   06/15/2022 12:17 PM

## 2022-06-22 PROBLEM — R68.89 ABNORMAL ANKLE BRACHIAL INDEX (ABI): Status: ACTIVE | Noted: 2022-06-22

## 2022-07-26 ENCOUNTER — TELEPHONE (OUTPATIENT)
Dept: OBSTETRICS AND GYNECOLOGY | Facility: CLINIC | Age: 82
End: 2022-07-26
Payer: MEDICARE

## 2022-08-15 PROBLEM — U07.1 COVID-19: Status: ACTIVE | Noted: 2022-08-15

## 2022-08-26 ENCOUNTER — PROCEDURE VISIT (OUTPATIENT)
Dept: OBSTETRICS AND GYNECOLOGY | Facility: CLINIC | Age: 82
End: 2022-08-26
Payer: MEDICARE

## 2022-08-26 VITALS — BODY MASS INDEX: 30.3 KG/M2 | WEIGHT: 150 LBS | SYSTOLIC BLOOD PRESSURE: 128 MMHG | DIASTOLIC BLOOD PRESSURE: 76 MMHG

## 2022-08-26 DIAGNOSIS — N81.10 PROLAPSE OF ANTERIOR VAGINAL WALL: ICD-10-CM

## 2022-08-26 DIAGNOSIS — Z46.89 FITTING AND ADJUSTMENT OF PESSARY: ICD-10-CM

## 2022-08-26 DIAGNOSIS — N39.3 STRESS INCONTINENCE: ICD-10-CM

## 2022-08-26 DIAGNOSIS — N81.11 CYSTOCELE, MIDLINE: Primary | ICD-10-CM

## 2022-08-26 PROCEDURE — 57160 PR FIT/INSERT INTRAVAG SUPPORT DEVICE: ICD-10-PCS | Mod: S$GLB,,, | Performed by: OBSTETRICS & GYNECOLOGY

## 2022-08-26 PROCEDURE — 57160 INSERT PESSARY/OTHER DEVICE: CPT | Mod: S$GLB,,, | Performed by: OBSTETRICS & GYNECOLOGY

## 2022-08-26 RX ORDER — OXYQUINOLINE SULFATE AND SODIUM LAURYL SULFATE .25; .1 MG/G; MG/G
1 JELLY VAGINAL
Qty: 113.4 G | Refills: 2 | Status: SHIPPED | OUTPATIENT
Start: 2022-08-29 | End: 2023-06-06

## 2022-08-26 NOTE — PROCEDURES
Procedures   Subjective:    Patient ID: Shanthi Can is a 81 y.o. y.o. female.     Chief Complaint:   Chief Complaint   Patient presents with    Procedure       History of Present Illness:   Shanthi presents for a pessary insertion.     MEDICATION LIST    Current Outpatient Medications:     ascorbic acid, vitamin C, (VITAMIN C) 500 MG tablet, Take 500 mg by mouth once daily., Disp: , Rfl:     aspirin (ECOTRIN) 81 MG EC tablet, Take 81 mg by mouth once daily. STOPPED 04/19/2022, Disp: , Rfl:     azelastine 0.15 % (205.5 mcg) Mark, , Disp: , Rfl:     BREO ELLIPTA 100-25 mcg/dose diskus inhaler, INHALE 1 PUFF EVERY DAY, Disp: 1 each, Rfl: 2    calcium carbonate (OS-SARA) 600 mg calcium (1,500 mg) Tab, Take 600 mg by mouth once., Disp: , Rfl:     coQ10, ubiquinol, 100 mg Cap, Take 200 mg by mouth every evening., Disp: 180 capsule, Rfl: 2    diltiaZEM (CARDIZEM CD) 120 MG Cp24, Take 1 capsule (120 mg total) by mouth once daily., Disp: 90 capsule, Rfl: 3    fluticasone propionate (FLONASE) 50 mcg/actuation nasal spray, USE 1 SPRAY IN EACH NOSTRIL EVERY DAY, Disp: 32 g, Rfl: 2    gabapentin (NEURONTIN) 100 MG capsule, Take 1 capsule (100 mg total) by mouth 2 (two) times daily., Disp: 180 capsule, Rfl: 1    glucosamine-chondroitin 500-400 mg tablet, Take 1 tablet by mouth 3 (three) times daily., Disp: , Rfl:     levothyroxine (SYNTHROID) 100 MCG tablet, TAKE 1 TABLET EVERY DAY, Disp: 90 tablet, Rfl: 1    magnesium 250 mg Tab, Take 400 mg by mouth every morning. 250mg QAM and 500mg QHS, Disp: , Rfl:     multivitamin capsule, Take 1 capsule by mouth once daily., Disp: , Rfl:     omega 3-dha-epa-fish oil (FISH OIL) 100-160-1,000 mg Cap, Take by mouth., Disp: , Rfl:     [START ON 8/29/2022] oxyquinoline-sod.lauryl sulfat (TRIMO-DUGAN JELLY) 0.025-0.01 % Gel, Place 1 Applicatorful vaginally twice a week., Disp: 113.4 g, Rfl: 2    pantoprazole (PROTONIX) 40 MG tablet, TAKE 1 TABLET EVERY DAY, Disp: 90  tablet, Rfl: 1    potassium 99 mg Tab, Take by mouth once., Disp: , Rfl:     rosuvastatin (CRESTOR) 40 MG Tab, TAKE 1 TABLET EVERY DAY, Disp: 90 tablet, Rfl: 1    PRE-PESSARY CHECK COUNSELING:  The patient was informed of the risks of pain or discomfort, continuous incontinence, urinary retention with insertion of a pessary and malodorous discharge and/or possible bleeding from granulation tissue after wearing the pessary for sometime. She was counseled on the alternatives to pessary insertion, including surgery or no treatment with continued symptoms, and she agrees to proceed.    PROCEDURE:  TIME OUT PERFORMED.  Inspection of vaginal canal performed and 2nd to 3rd degree cystocele noted..  There was no granulation tissue present.   The pessary was inserted #4 ring with support and knob.  The patient was able to sit, stand, walk and either cough or urinate on the toilet after the procedure normally without expelling the pessary. The patient tolerated the procedure well.    TRIMOSAN vaginal cream prescribed       ASSESSMENT:      1. Cystocele, midline    2. Prolapse of anterior vaginal wall    3. Stress incontinence    4. Fitting and adjustment of pessary        POST PESSARY CHECK COUNSELING:  Report worsening urinary incontinency, urinary retention, pain, fever, foul smelling discharge or bleeding.  Importance of keeping follow-up appointments for a pessary check stressed.    Counseling lasted approximately 10 minutes and all her questions were answered.    FOLLOW-UP: In 1 months for pessary check.      Karen Mota MD FACOG    08/26/2022  11:13 AM

## 2022-09-07 ENCOUNTER — OFFICE VISIT (OUTPATIENT)
Dept: OPHTHALMOLOGY | Facility: CLINIC | Age: 82
End: 2022-09-07
Payer: MEDICARE

## 2022-09-07 DIAGNOSIS — H52.03 HYPEROPIA OF BOTH EYES: ICD-10-CM

## 2022-09-07 DIAGNOSIS — H25.13 NUCLEAR SCLEROSIS, BILATERAL: Primary | ICD-10-CM

## 2022-09-07 DIAGNOSIS — H57.9 SHALLOW ANTERIOR CHAMBER OF EYE: ICD-10-CM

## 2022-09-07 PROCEDURE — 99999 PR PBB SHADOW E&M-EST. PATIENT-LVL III: ICD-10-PCS | Mod: PBBFAC,,, | Performed by: OPHTHALMOLOGY

## 2022-09-07 PROCEDURE — 99204 PR OFFICE/OUTPT VISIT, NEW, LEVL IV, 45-59 MIN: ICD-10-PCS | Mod: S$GLB,,, | Performed by: OPHTHALMOLOGY

## 2022-09-07 PROCEDURE — 1100F PR PT FALLS ASSESS DOC 2+ FALLS/FALL W/INJURY/YR: ICD-10-PCS | Mod: CPTII,S$GLB,, | Performed by: OPHTHALMOLOGY

## 2022-09-07 PROCEDURE — 92136 OPHTHALMIC BIOMETRY: CPT | Mod: RT,S$GLB,, | Performed by: OPHTHALMOLOGY

## 2022-09-07 PROCEDURE — 3288F FALL RISK ASSESSMENT DOCD: CPT | Mod: CPTII,S$GLB,, | Performed by: OPHTHALMOLOGY

## 2022-09-07 PROCEDURE — 1126F AMNT PAIN NOTED NONE PRSNT: CPT | Mod: CPTII,S$GLB,, | Performed by: OPHTHALMOLOGY

## 2022-09-07 PROCEDURE — 1159F MED LIST DOCD IN RCRD: CPT | Mod: CPTII,S$GLB,, | Performed by: OPHTHALMOLOGY

## 2022-09-07 PROCEDURE — 99999 PR PBB SHADOW E&M-EST. PATIENT-LVL III: CPT | Mod: PBBFAC,,, | Performed by: OPHTHALMOLOGY

## 2022-09-07 PROCEDURE — 1159F PR MEDICATION LIST DOCUMENTED IN MEDICAL RECORD: ICD-10-PCS | Mod: CPTII,S$GLB,, | Performed by: OPHTHALMOLOGY

## 2022-09-07 PROCEDURE — 1126F PR PAIN SEVERITY QUANTIFIED, NO PAIN PRESENT: ICD-10-PCS | Mod: CPTII,S$GLB,, | Performed by: OPHTHALMOLOGY

## 2022-09-07 PROCEDURE — 3288F PR FALLS RISK ASSESSMENT DOCUMENTED: ICD-10-PCS | Mod: CPTII,S$GLB,, | Performed by: OPHTHALMOLOGY

## 2022-09-07 PROCEDURE — 1100F PTFALLS ASSESS-DOCD GE2>/YR: CPT | Mod: CPTII,S$GLB,, | Performed by: OPHTHALMOLOGY

## 2022-09-07 PROCEDURE — 92136 IOL MASTER - OU - BOTH EYES: ICD-10-PCS | Mod: RT,S$GLB,, | Performed by: OPHTHALMOLOGY

## 2022-09-07 PROCEDURE — 99204 OFFICE O/P NEW MOD 45 MIN: CPT | Mod: S$GLB,,, | Performed by: OPHTHALMOLOGY

## 2022-09-07 PROCEDURE — 1160F RVW MEDS BY RX/DR IN RCRD: CPT | Mod: CPTII,S$GLB,, | Performed by: OPHTHALMOLOGY

## 2022-09-07 PROCEDURE — 1160F PR REVIEW ALL MEDS BY PRESCRIBER/CLIN PHARMACIST DOCUMENTED: ICD-10-PCS | Mod: CPTII,S$GLB,, | Performed by: OPHTHALMOLOGY

## 2022-09-07 RX ORDER — TETRACAINE HYDROCHLORIDE 5 MG/ML
1 SOLUTION OPHTHALMIC
Status: CANCELLED | OUTPATIENT
Start: 2022-09-07

## 2022-09-07 RX ORDER — MOXIFLOXACIN 5 MG/ML
1 SOLUTION/ DROPS OPHTHALMIC
Status: CANCELLED | OUTPATIENT
Start: 2022-09-07

## 2022-09-07 RX ORDER — PHENYLEPHRINE HYDROCHLORIDE 25 MG/ML
1 SOLUTION/ DROPS OPHTHALMIC
Status: CANCELLED | OUTPATIENT
Start: 2022-09-07

## 2022-09-07 RX ORDER — PREDNISOLONE ACETATE-GATIFLOXACIN-BROMFENAC .75; 5; 1 MG/ML; MG/ML; MG/ML
1 SUSPENSION/ DROPS OPHTHALMIC 3 TIMES DAILY
Qty: 5 ML | Refills: 3 | Status: SHIPPED | OUTPATIENT
Start: 2022-09-07 | End: 2023-05-15

## 2022-09-07 RX ORDER — PHENYLEPHRINE HYDROCHLORIDE 100 MG/ML
1 SOLUTION/ DROPS OPHTHALMIC
Status: CANCELLED | OUTPATIENT
Start: 2022-09-07

## 2022-09-07 RX ORDER — TROPICAMIDE 10 MG/ML
1 SOLUTION/ DROPS OPHTHALMIC
Status: CANCELLED | OUTPATIENT
Start: 2022-09-07

## 2022-09-07 NOTE — PROGRESS NOTES
HPI    Dls:6/28/22 Dr. Gee    80 y/o female presents today for cat eval per Dr. Gee  Pt c/o blurry vision at distance ou. Pt wears pal's.     + glare   No pain  +dry eyes   +floaters ou off/on     Eye meds  Systane OU BID   Systane gel OU Q HS   Erwin A ou prn            Last edited by Kathy Owusu MA on 9/7/2022  1:35 PM.            Assessment /Plan     For exam results, see Encounter Report.    Nuclear sclerosis, bilateral    Hyperopia of both eyes    Shallow anterior chamber of eye      Visually Significant Cataract: Patient reports decreased vision consistent with the clinical amount of lenticular opacity, which reaches the level of visual significance and affects activities of daily living.     Specifically, this patient describes difficulty with:  - driving safely at night  - reading road signs  - reading small print  - deciphering medicine bottles  - reading the newspaper  - using the phone  - reading texts     Risks, benefits, and alternatives to cataract surgery were discussed and the consent reviewed. IOL options were discussed, including ATIOLs and the associated side effects and additional patient cost associated with them.   IOL Selections:   Right eye  IOL: diboo 31.0     Left eye  IOL: diboo 28.5    Pt wishes to have right eye done first.  Short eye, so use ORA to verify IOL power    The patient expresses a desire to reduce spectacle dependence. I reviewed various IOL and LASER refractive surgical options and we will attempt to minimize spectacle dependence by managing astigmatism and optimizing IOL selection. Femtosecond LASER assisted cataract surgery (FLACS) technology was explained to the patient with educational videos and discussion.  The patient voices understanding and wishes to implement this technology during the cataract procedure.  I explained the increased precision of the LASER versus manual techniques, especially as it relates to astigmatism reduction with arcuate  incisions.  I emphasized that although our goal is to reduce the need for refractive correction after surgery, there may still be a need for spectacle correction to achieve optimal visual acuity, and that a reasonable range of functional vision should be the expectation.  No guarantees are made about post operative refraction or visual acuity, as the eye may heal in unpredictable ways, and the standard risks, benefits, and alternatives to cataract surgery were explained.  The patient understands that the refractive portions of this cataract procedure are not covered by insurance, and that there is an out of pocket expense of $1250 per eye. I also explained that even though our pre-operative plan is to utilize advanced refractive technologies during surgery, that I may decide to eliminate part or all of this plan if surgical challenges or complications arise, or I feel that it is not in the patient's best interest. Consent forms and an ABN form were given to the patient to review.    ORA ONLY

## 2022-09-26 ENCOUNTER — TELEPHONE (OUTPATIENT)
Dept: OPHTHALMOLOGY | Facility: CLINIC | Age: 82
End: 2022-09-26
Payer: MEDICARE

## 2022-09-26 DIAGNOSIS — H25.11 NUCLEAR SCLEROTIC CATARACT OF RIGHT EYE: Primary | ICD-10-CM

## 2022-10-05 ENCOUNTER — OFFICE VISIT (OUTPATIENT)
Dept: OBSTETRICS AND GYNECOLOGY | Facility: CLINIC | Age: 82
End: 2022-10-05
Payer: MEDICARE

## 2022-10-05 VITALS
BODY MASS INDEX: 30.04 KG/M2 | HEART RATE: 78 BPM | SYSTOLIC BLOOD PRESSURE: 124 MMHG | HEIGHT: 59 IN | WEIGHT: 149 LBS | DIASTOLIC BLOOD PRESSURE: 69 MMHG

## 2022-10-05 DIAGNOSIS — N39.3 STRESS INCONTINENCE: ICD-10-CM

## 2022-10-05 DIAGNOSIS — Z46.89 FITTING AND ADJUSTMENT OF PESSARY: ICD-10-CM

## 2022-10-05 DIAGNOSIS — Z46.89 PESSARY MAINTENANCE: ICD-10-CM

## 2022-10-05 DIAGNOSIS — N81.10 PROLAPSE OF ANTERIOR VAGINAL WALL: ICD-10-CM

## 2022-10-05 DIAGNOSIS — N81.11 CYSTOCELE, MIDLINE: Primary | ICD-10-CM

## 2022-10-05 PROCEDURE — 1159F PR MEDICATION LIST DOCUMENTED IN MEDICAL RECORD: ICD-10-PCS | Mod: CPTII,S$GLB,, | Performed by: OBSTETRICS & GYNECOLOGY

## 2022-10-05 PROCEDURE — 99999 PR PBB SHADOW E&M-EST. PATIENT-LVL III: ICD-10-PCS | Mod: PBBFAC,,, | Performed by: OBSTETRICS & GYNECOLOGY

## 2022-10-05 PROCEDURE — 3074F SYST BP LT 130 MM HG: CPT | Mod: CPTII,S$GLB,, | Performed by: OBSTETRICS & GYNECOLOGY

## 2022-10-05 PROCEDURE — 1159F MED LIST DOCD IN RCRD: CPT | Mod: CPTII,S$GLB,, | Performed by: OBSTETRICS & GYNECOLOGY

## 2022-10-05 PROCEDURE — 99999 PR PBB SHADOW E&M-EST. PATIENT-LVL III: CPT | Mod: PBBFAC,,, | Performed by: OBSTETRICS & GYNECOLOGY

## 2022-10-05 PROCEDURE — 3074F PR MOST RECENT SYSTOLIC BLOOD PRESSURE < 130 MM HG: ICD-10-PCS | Mod: CPTII,S$GLB,, | Performed by: OBSTETRICS & GYNECOLOGY

## 2022-10-05 PROCEDURE — 1160F PR REVIEW ALL MEDS BY PRESCRIBER/CLIN PHARMACIST DOCUMENTED: ICD-10-PCS | Mod: CPTII,S$GLB,, | Performed by: OBSTETRICS & GYNECOLOGY

## 2022-10-05 PROCEDURE — 1160F RVW MEDS BY RX/DR IN RCRD: CPT | Mod: CPTII,S$GLB,, | Performed by: OBSTETRICS & GYNECOLOGY

## 2022-10-05 PROCEDURE — 99213 PR OFFICE/OUTPT VISIT, EST, LEVL III, 20-29 MIN: ICD-10-PCS | Mod: S$GLB,,, | Performed by: OBSTETRICS & GYNECOLOGY

## 2022-10-05 PROCEDURE — 3078F DIAST BP <80 MM HG: CPT | Mod: CPTII,S$GLB,, | Performed by: OBSTETRICS & GYNECOLOGY

## 2022-10-05 PROCEDURE — 99213 OFFICE O/P EST LOW 20 MIN: CPT | Mod: S$GLB,,, | Performed by: OBSTETRICS & GYNECOLOGY

## 2022-10-05 PROCEDURE — 3078F PR MOST RECENT DIASTOLIC BLOOD PRESSURE < 80 MM HG: ICD-10-PCS | Mod: CPTII,S$GLB,, | Performed by: OBSTETRICS & GYNECOLOGY

## 2022-10-05 NOTE — PROGRESS NOTES
Subjective:    Patient ID: Shanthi Can is a 81 y.o. y.o. female.     Chief Complaint:   Chief Complaint   Patient presents with    Pessary Check     F/u no c/o       History of Present Illness:   Shanthi presents for a pessary check. She has been doing well wearing the pessary and has no complaints of bleeding or pain.    MEDICATION LIST    Current Outpatient Medications:     ascorbic acid, vitamin C, (VITAMIN C) 500 MG tablet, Take 500 mg by mouth once daily., Disp: , Rfl:     aspirin (ECOTRIN) 81 MG EC tablet, Take 81 mg by mouth once daily. STOPPED 04/19/2022, Disp: , Rfl:     BREO ELLIPTA 100-25 mcg/dose diskus inhaler, INHALE 1 PUFF EVERY DAY, Disp: 1 each, Rfl: 2    calcium carbonate (OS-SARA) 600 mg calcium (1,500 mg) Tab, Take 600 mg by mouth once., Disp: , Rfl:     coQ10, ubiquinol, 100 mg Cap, Take 200 mg by mouth every evening., Disp: 180 capsule, Rfl: 2    diltiaZEM (CARDIZEM CD) 120 MG Cp24, Take 1 capsule (120 mg total) by mouth once daily., Disp: 90 capsule, Rfl: 3    fluticasone propionate (FLONASE) 50 mcg/actuation nasal spray, USE 1 SPRAY IN EACH NOSTRIL EVERY DAY, Disp: 32 g, Rfl: 2    gabapentin (NEURONTIN) 100 MG capsule, Take 1 capsule (100 mg total) by mouth 2 (two) times daily., Disp: 180 capsule, Rfl: 1    glucosamine-chondroitin 500-400 mg tablet, Take 1 tablet by mouth 3 (three) times daily., Disp: , Rfl:     levothyroxine (SYNTHROID) 100 MCG tablet, TAKE 1 TABLET EVERY DAY, Disp: 90 tablet, Rfl: 1    magnesium 250 mg Tab, Take 400 mg by mouth every morning. 250mg QAM and 500mg QHS, Disp: , Rfl:     multivitamin capsule, Take 1 capsule by mouth once daily., Disp: , Rfl:     omega 3-dha-epa-fish oil (FISH OIL) 100-160-1,000 mg Cap, Take by mouth., Disp: , Rfl:     oxyquinoline-sod.lauryl sulfat (TRIMO-DUGAN JELLY) 0.025-0.01 % Gel, Place 1 Applicatorful vaginally twice a week., Disp: 113.4 g, Rfl: 2    pantoprazole (PROTONIX) 40 MG tablet, TAKE 1 TABLET EVERY DAY, Disp: 90 tablet,  Rfl: 1    potassium 99 mg Tab, Take by mouth once., Disp: , Rfl:     rosuvastatin (CRESTOR) 40 MG Tab, TAKE 1 TABLET EVERY DAY, Disp: 90 tablet, Rfl: 1    azelastine 0.15 % (205.5 mcg) [place], , Disp: , Rfl:     prednisolon/gatiflox/bromfenac (PREDNISOL ACE-GATIFLOX-BROMFEN) 1-0.5-0.075 % DrpS, Apply 1 drop to eye 3 (three) times daily. in operative eye for 1 month after surgery (Patient not taking: Reported on 10/5/2022), Disp: 5 mL, Rfl: 3    PRE-PESSARY CHECK COUNSELING:  The patient was informed of the risks of pain or discomfort, continuous incontinence, urinary retention with insertion of a pessary and malodorous discharge and/or possible bleeding from granulation tissue after wearing the pessary for sometime. She was counseled on the alternatives to pessary insertion, including surgery or no treatment with continued symptoms, and she agrees to proceed.    PROCEDURE:  TIME OUT PERFORMED.  The pessary was removed and cleaned with soap and water.  There was no granulation tissue present. There is a mild area of erythema along the vaginal wall at the site of the pessary but no ulceration or granulation was noted.  There was a slight discharge present.  The pessary was re-inserted.  The patient was able to sit, stand, walk and either cough or urinate on the toilet after the procedure normally without expelling the pessary. The patient tolerated the procedure well.          ASSESSMENT:      1. Cystocele, midline    2. Prolapse of anterior vaginal wall    3. Stress incontinence    4. Fitting and adjustment of pessary    5. Pessary maintenance        POST PESSARY CHECK COUNSELING:  Report worsening urinary incontinency, urinary retention, pain, fever, foul smelling discharge or bleeding.  Importance of keeping follow-up appointments for a pessary check stressed.    Counseling lasted approximately 10 minutes and all her questions were answered.    FOLLOW-UP: In 3 months for pessary check.      Karen Mota MD  FACOG    10/05/2022  11:21 AM

## 2022-11-01 ENCOUNTER — TELEPHONE (OUTPATIENT)
Dept: OPHTHALMOLOGY | Facility: CLINIC | Age: 82
End: 2022-11-01
Payer: MEDICARE

## 2022-11-01 DIAGNOSIS — H25.12 NUCLEAR SCLEROTIC CATARACT OF LEFT EYE: Primary | ICD-10-CM

## 2022-11-02 ENCOUNTER — HOSPITAL ENCOUNTER (OUTPATIENT)
Facility: HOSPITAL | Age: 82
Discharge: HOME | End: 2022-11-03
Attending: EMERGENCY MEDICINE | Admitting: EMERGENCY MEDICINE
Payer: MEDICARE

## 2022-11-02 DIAGNOSIS — I10 ESSENTIAL HYPERTENSION: ICD-10-CM

## 2022-11-02 DIAGNOSIS — J44.1 COPD EXACERBATION: Primary | ICD-10-CM

## 2022-11-02 DIAGNOSIS — J06.9 VIRAL UPPER RESPIRATORY TRACT INFECTION: ICD-10-CM

## 2022-11-02 LAB
ALBUMIN SERPL BCP-MCNC: 3.6 G/DL (ref 3.5–5.2)
ALP SERPL-CCNC: 45 U/L (ref 55–135)
ALT SERPL W/O P-5'-P-CCNC: 21 U/L (ref 10–44)
ANION GAP SERPL CALC-SCNC: 8 MMOL/L (ref 8–16)
AST SERPL-CCNC: 23 U/L (ref 10–40)
BASOPHILS # BLD AUTO: 0.04 K/UL (ref 0–0.2)
BASOPHILS NFR BLD: 0.2 % (ref 0–1.9)
BILIRUB SERPL-MCNC: 0.9 MG/DL (ref 0.1–1)
BUN SERPL-MCNC: 13 MG/DL (ref 8–23)
CALCIUM SERPL-MCNC: 8.4 MG/DL (ref 8.7–10.5)
CHLORIDE SERPL-SCNC: 91 MMOL/L (ref 95–110)
CO2 SERPL-SCNC: 27 MMOL/L (ref 23–29)
CREAT SERPL-MCNC: 0.5 MG/DL (ref 0.5–1.4)
CTP QC/QA: YES
CTP QC/QA: YES
DIFFERENTIAL METHOD: ABNORMAL
EOSINOPHIL # BLD AUTO: 0 K/UL (ref 0–0.5)
EOSINOPHIL NFR BLD: 0.1 % (ref 0–8)
ERYTHROCYTE [DISTWIDTH] IN BLOOD BY AUTOMATED COUNT: 13.4 % (ref 11.5–14.5)
EST. GFR  (NO RACE VARIABLE): >60 ML/MIN/1.73 M^2
GLUCOSE SERPL-MCNC: 184 MG/DL (ref 70–110)
HCT VFR BLD AUTO: 32.5 % (ref 37–48.5)
HGB BLD-MCNC: 11 G/DL (ref 12–16)
IMM GRANULOCYTES # BLD AUTO: 0.08 K/UL (ref 0–0.04)
IMM GRANULOCYTES NFR BLD AUTO: 0.4 % (ref 0–0.5)
LYMPHOCYTES # BLD AUTO: 1.8 K/UL (ref 1–4.8)
LYMPHOCYTES NFR BLD: 9.5 % (ref 18–48)
MCH RBC QN AUTO: 29.9 PG (ref 27–31)
MCHC RBC AUTO-ENTMCNC: 33.8 G/DL (ref 32–36)
MCV RBC AUTO: 88 FL (ref 82–98)
MONOCYTES # BLD AUTO: 1.6 K/UL (ref 0.3–1)
MONOCYTES NFR BLD: 8.4 % (ref 4–15)
NEUTROPHILS # BLD AUTO: 15.2 K/UL (ref 1.8–7.7)
NEUTROPHILS NFR BLD: 81.4 % (ref 38–73)
NRBC BLD-RTO: 0 /100 WBC
NT-PROBNP SERPL-MCNC: 922 PG/ML (ref 5–1800)
PLATELET # BLD AUTO: 188 K/UL (ref 150–450)
PMV BLD AUTO: 9.4 FL (ref 9.2–12.9)
POC MOLECULAR INFLUENZA A AGN: NEGATIVE
POC MOLECULAR INFLUENZA B AGN: NEGATIVE
POTASSIUM SERPL-SCNC: 3.5 MMOL/L (ref 3.5–5.1)
PROT SERPL-MCNC: 6.9 G/DL (ref 6–8.4)
RBC # BLD AUTO: 3.68 M/UL (ref 4–5.4)
SARS-COV-2 RDRP RESP QL NAA+PROBE: NEGATIVE
SODIUM SERPL-SCNC: 126 MMOL/L (ref 136–145)
TROPONIN I SERPL DL<=0.01 NG/ML-MCNC: 14.4 PG/ML (ref 0–60)
WBC # BLD AUTO: 18.67 K/UL (ref 3.9–12.7)

## 2022-11-02 PROCEDURE — 96361 HYDRATE IV INFUSION ADD-ON: CPT

## 2022-11-02 PROCEDURE — G0378 HOSPITAL OBSERVATION PER HR: HCPCS | Mod: CS

## 2022-11-02 PROCEDURE — 25000242 PHARM REV CODE 250 ALT 637 W/ HCPCS: Performed by: EMERGENCY MEDICINE

## 2022-11-02 PROCEDURE — 87502 INFLUENZA DNA AMP PROBE: CPT

## 2022-11-02 PROCEDURE — 85025 COMPLETE CBC W/AUTO DIFF WBC: CPT | Performed by: EMERGENCY MEDICINE

## 2022-11-02 PROCEDURE — 99900031 HC PATIENT EDUCATION (STAT)

## 2022-11-02 PROCEDURE — 96366 THER/PROPH/DIAG IV INF ADDON: CPT

## 2022-11-02 PROCEDURE — 99900035 HC TECH TIME PER 15 MIN (STAT)

## 2022-11-02 PROCEDURE — 63600175 PHARM REV CODE 636 W HCPCS: Performed by: EMERGENCY MEDICINE

## 2022-11-02 PROCEDURE — 96376 TX/PRO/DX INJ SAME DRUG ADON: CPT

## 2022-11-02 PROCEDURE — 94640 AIRWAY INHALATION TREATMENT: CPT | Mod: XB

## 2022-11-02 PROCEDURE — 84484 ASSAY OF TROPONIN QUANT: CPT | Performed by: EMERGENCY MEDICINE

## 2022-11-02 PROCEDURE — 83880 ASSAY OF NATRIURETIC PEPTIDE: CPT | Performed by: EMERGENCY MEDICINE

## 2022-11-02 PROCEDURE — G0378 HOSPITAL OBSERVATION PER HR: HCPCS | Mod: OBSCO

## 2022-11-02 PROCEDURE — 87635 SARS-COV-2 COVID-19 AMP PRB: CPT | Performed by: EMERGENCY MEDICINE

## 2022-11-02 PROCEDURE — 94761 N-INVAS EAR/PLS OXIMETRY MLT: CPT

## 2022-11-02 PROCEDURE — 99285 EMERGENCY DEPT VISIT HI MDM: CPT | Mod: 25,CS

## 2022-11-02 PROCEDURE — 96375 TX/PRO/DX INJ NEW DRUG ADDON: CPT

## 2022-11-02 PROCEDURE — 25000003 PHARM REV CODE 250: Performed by: EMERGENCY MEDICINE

## 2022-11-02 PROCEDURE — 96365 THER/PROPH/DIAG IV INF INIT: CPT

## 2022-11-02 PROCEDURE — 80053 COMPREHEN METABOLIC PANEL: CPT | Performed by: EMERGENCY MEDICINE

## 2022-11-02 PROCEDURE — 36415 COLL VENOUS BLD VENIPUNCTURE: CPT | Performed by: EMERGENCY MEDICINE

## 2022-11-02 RX ORDER — ONDANSETRON 2 MG/ML
4 INJECTION INTRAMUSCULAR; INTRAVENOUS EVERY 8 HOURS PRN
Status: DISCONTINUED | OUTPATIENT
Start: 2022-11-02 | End: 2022-11-03 | Stop reason: HOSPADM

## 2022-11-02 RX ORDER — ASPIRIN 81 MG/1
81 TABLET ORAL DAILY
Status: DISCONTINUED | OUTPATIENT
Start: 2022-11-03 | End: 2022-11-03 | Stop reason: HOSPADM

## 2022-11-02 RX ORDER — TALC
6 POWDER (GRAM) TOPICAL NIGHTLY PRN
Status: DISCONTINUED | OUTPATIENT
Start: 2022-11-02 | End: 2022-11-03 | Stop reason: HOSPADM

## 2022-11-02 RX ORDER — ACETAMINOPHEN 325 MG/1
650 TABLET ORAL EVERY 8 HOURS PRN
Status: DISCONTINUED | OUTPATIENT
Start: 2022-11-02 | End: 2022-11-03 | Stop reason: HOSPADM

## 2022-11-02 RX ORDER — IPRATROPIUM BROMIDE AND ALBUTEROL SULFATE 2.5; .5 MG/3ML; MG/3ML
3 SOLUTION RESPIRATORY (INHALATION)
Status: COMPLETED | OUTPATIENT
Start: 2022-11-02 | End: 2022-11-02

## 2022-11-02 RX ORDER — DILTIAZEM HYDROCHLORIDE 120 MG/1
120 CAPSULE, COATED, EXTENDED RELEASE ORAL DAILY
Status: DISCONTINUED | OUTPATIENT
Start: 2022-11-03 | End: 2022-11-03 | Stop reason: HOSPADM

## 2022-11-02 RX ORDER — SODIUM CHLORIDE 9 MG/ML
1000 INJECTION, SOLUTION INTRAVENOUS CONTINUOUS
Status: DISCONTINUED | OUTPATIENT
Start: 2022-11-02 | End: 2022-11-02

## 2022-11-02 RX ORDER — METHYLPREDNISOLONE SOD SUCC 125 MG
125 VIAL (EA) INJECTION
Status: COMPLETED | OUTPATIENT
Start: 2022-11-02 | End: 2022-11-02

## 2022-11-02 RX ORDER — FAMOTIDINE 20 MG/1
20 TABLET, FILM COATED ORAL 2 TIMES DAILY
Status: DISCONTINUED | OUTPATIENT
Start: 2022-11-02 | End: 2022-11-03 | Stop reason: HOSPADM

## 2022-11-02 RX ORDER — ATORVASTATIN CALCIUM 40 MG/1
40 TABLET, FILM COATED ORAL DAILY
Status: DISCONTINUED | OUTPATIENT
Start: 2022-11-03 | End: 2022-11-03 | Stop reason: HOSPADM

## 2022-11-02 RX ORDER — LEVOTHYROXINE SODIUM 100 UG/1
100 TABLET ORAL
Status: DISCONTINUED | OUTPATIENT
Start: 2022-11-03 | End: 2022-11-03 | Stop reason: HOSPADM

## 2022-11-02 RX ORDER — IPRATROPIUM BROMIDE AND ALBUTEROL SULFATE 2.5; .5 MG/3ML; MG/3ML
3 SOLUTION RESPIRATORY (INHALATION) EVERY 4 HOURS PRN
Status: DISCONTINUED | OUTPATIENT
Start: 2022-11-02 | End: 2022-11-03 | Stop reason: HOSPADM

## 2022-11-02 RX ORDER — METHYLPREDNISOLONE SOD SUCC 125 MG
125 VIAL (EA) INJECTION
Status: DISCONTINUED | OUTPATIENT
Start: 2022-11-02 | End: 2022-11-03 | Stop reason: HOSPADM

## 2022-11-02 RX ORDER — SODIUM CHLORIDE 0.9 % (FLUSH) 0.9 %
10 SYRINGE (ML) INJECTION
Status: DISCONTINUED | OUTPATIENT
Start: 2022-11-02 | End: 2022-11-03 | Stop reason: HOSPADM

## 2022-11-02 RX ADMIN — SODIUM CHLORIDE 1000 ML: 9 INJECTION, SOLUTION INTRAVENOUS at 03:11

## 2022-11-02 RX ADMIN — FAMOTIDINE 20 MG: 20 TABLET ORAL at 08:11

## 2022-11-02 RX ADMIN — PIPERACILLIN AND TAZOBACTAM 4.5 G: 4; .5 INJECTION, POWDER, LYOPHILIZED, FOR SOLUTION INTRAVENOUS; PARENTERAL at 03:11

## 2022-11-02 RX ADMIN — METHYLPREDNISOLONE SODIUM SUCCINATE 125 MG: 125 INJECTION, POWDER, FOR SOLUTION INTRAMUSCULAR; INTRAVENOUS at 01:11

## 2022-11-02 RX ADMIN — PIPERACILLIN AND TAZOBACTAM 4.5 G: 4; .5 INJECTION, POWDER, LYOPHILIZED, FOR SOLUTION INTRAVENOUS; PARENTERAL at 11:11

## 2022-11-02 RX ADMIN — IPRATROPIUM BROMIDE AND ALBUTEROL SULFATE 3 ML: 2.5; .5 SOLUTION RESPIRATORY (INHALATION) at 01:11

## 2022-11-02 RX ADMIN — METHYLPREDNISOLONE SODIUM SUCCINATE 125 MG: 125 INJECTION, POWDER, FOR SOLUTION INTRAMUSCULAR; INTRAVENOUS at 08:11

## 2022-11-02 RX ADMIN — IPRATROPIUM BROMIDE AND ALBUTEROL SULFATE 3 ML: 2.5; .5 SOLUTION RESPIRATORY (INHALATION) at 08:11

## 2022-11-02 NOTE — ED PROVIDER NOTES
Encounter Date: 11/2/2022       History     Chief Complaint   Patient presents with    Shortness of Breath     Patient reports being SOB. Reports that she has an upper respiratory infection but last night she began wheezing. Pt reports that she has a hx of COPD.      81-year-old female with a history of COPD presents to the emergency department with an upper respiratory tract infection exacerbating her COPD over the past 3 days.  Wheezing became pronounced last night.  Eating of cough.  No fever.  She is not ill appearing, alert oriented x4, GCS is 15.  Audible wheezes noted, no stridor.  Denies any chest pain.  No nausea vomiting diarrhea    Review of patient's allergies indicates:  No Known Allergies  Past Medical History:   Diagnosis Date    Arthritis     Change in bowel habit     COPD (chronic obstructive pulmonary disease)     Dyspnea     GERD (gastroesophageal reflux disease)     High cholesterol     Hx of pancreatitis     Hypertension     IFG (impaired fasting glucose)     Occlusion and stenosis of unspecified carotid artery     SOB (shortness of breath)     Thyroid disease     hypothyroidism     Past Surgical History:   Procedure Laterality Date    CHOLECYSTECTOMY  2017    COLONOSCOPY      DILATION AND CURETTAGE OF UTERUS      TENDON RELEASE Bilateral     GREAT TOES    TONSILLECTOMY       Family History   Problem Relation Age of Onset    Heart failure Mother 78        pacemaker    Diabetes Mother     Lung cancer Father 68    Hypertension Father     Brain cancer Sister 50    Lung cancer Sister     COPD Sister 49    No Known Problems Sister     Lung cancer Brother 75    Diabetes Brother     No Known Problems Maternal Aunt     No Known Problems Maternal Uncle     No Known Problems Paternal Aunt     No Known Problems Paternal Uncle     No Known Problems Maternal Grandmother     No Known Problems Maternal Grandfather     No Known Problems Paternal Grandmother     No Known Problems Paternal Grandfather     No  Known Problems Other     Breast cancer Neg Hx     Ovarian cancer Neg Hx     BRCA 1/2 Neg Hx      Social History     Tobacco Use    Smoking status: Former     Packs/day: 1.00     Years: 20.00     Pack years: 20.00     Types: Cigarettes     Quit date:      Years since quittin.8    Smokeless tobacco: Never   Substance Use Topics    Alcohol use: Yes     Comment: OCCASSIONAL    Drug use: No     Review of Systems   Constitutional:  Negative for fever.   HENT:  Negative for sore throat.    Respiratory:  Positive for wheezing. Negative for shortness of breath.    Cardiovascular:  Negative for chest pain.   Gastrointestinal:  Negative for nausea.   Genitourinary:  Negative for dysuria.   Musculoskeletal:  Negative for back pain.   Skin:  Negative for rash.   Neurological:  Negative for weakness.   Hematological:  Does not bruise/bleed easily.   All other systems reviewed and are negative.    Physical Exam     Initial Vitals   BP Pulse Resp Temp SpO2   22 1309 22 1309 22 1309 22 1313 22 1309   (!) 147/81 80 18 98 °F (36.7 °C) (!) 93 %      MAP       --                Physical Exam    Nursing note and vitals reviewed.  Constitutional: She appears well-developed and well-nourished. She is not diaphoretic. No distress.   HENT:   Head: Normocephalic and atraumatic.   Mouth/Throat: Oropharynx is clear and moist.   Eyes: Conjunctivae and EOM are normal. Pupils are equal, round, and reactive to light.   Neck: Neck supple. No thyromegaly present. No tracheal deviation present. No JVD present.   Normal range of motion.  Cardiovascular:  Normal rate, regular rhythm, normal heart sounds and intact distal pulses.           No murmur heard.  Pulmonary/Chest: No stridor. No respiratory distress. She has wheezes. She has no rhonchi. She has no rales. She exhibits no tenderness.   Abdominal: Abdomen is soft. Bowel sounds are normal. She exhibits no distension. There is no abdominal tenderness. There is  no rebound and no guarding.   Musculoskeletal:         General: No tenderness or edema. Normal range of motion.      Cervical back: Normal range of motion and neck supple.     Neurological: She is alert and oriented to person, place, and time. She has normal strength. No cranial nerve deficit. GCS score is 15. GCS eye subscore is 4. GCS verbal subscore is 5. GCS motor subscore is 6.   Skin: Skin is warm and dry. Capillary refill takes less than 2 seconds. No rash noted. No erythema. No pallor.   Psychiatric: She has a normal mood and affect.       ED Course   Procedures  Labs Reviewed   CBC W/ AUTO DIFFERENTIAL   COMPREHENSIVE METABOLIC PANEL   NT-PRO NATRIURETIC PEPTIDE   TROPONIN I HIGH SENSITIVITY   POCT INFLUENZA A/B MOLECULAR   SARS-COV-2 RDRP GENE    Narrative:     This test utilizes isothermal nucleic acid amplification   technology to detect the SARS-CoV-2 RdRp nucleic acid segment.   The analytical sensitivity (limit of detection) is 125 genome   equivalents/mL.   A POSITIVE result implies infection with the SARS-CoV-2 virus;   the patient is presumed to be contagious.     A NEGATIVE result means that SARS-CoV-2 nucleic acids are not   present above the limit of detection. A NEGATIVE result should be   treated as presumptive. It does not rule out the possibility of   COVID-19 and should not be the sole basis for treatment decisions.   If COVID-19 is strongly suspected based on clinical and exposure   history, re-testing using an alternate molecular assay should be   considered.   This test is only for use under the Food and Drug   Administration s Emergency Use Authorization (EUA).   Commercial kits are provided by [employer].   Performance characteristics of the EUA have been independently   verified by [facility] Department of   Pathology and Laboratory Medicine.   _________________________________________________________________   The authorized Fact Sheet for Healthcare Providers and  the authorized Fact   Sheet for Patients of the ID NOW COVID-19 are available on the FDA   website:     https://www.fda.gov/media/513501/download  https://www.fda.gov/media/295457/download                 Imaging Results              X-Ray Chest 1 View (Final result)  Result time 11/02/22 13:45:11      Final result by Chris Gallardo MD (11/02/22 13:45:11)                   Impression:      As above.      Electronically signed by: Chris Gallardo MD  Date:    11/02/2022  Time:    13:45               Narrative:    EXAMINATION:  XR CHEST 1 VIEW    CLINICAL HISTORY:  COPD exacerbation    COMPARISON:  08/27/2019    FINDINGS:  Cardiac silhouette is prominent, possibly accentuated by technique.  There are diffuse ground-glass opacities of the lungs, possibly reflecting edema.  No obvious pleural effusion.                                       Medications   albuterol-ipratropium 2.5 mg-0.5 mg/3 mL nebulizer solution 3 mL (3 mLs Nebulization Given 11/2/22 1346)   methylPREDNISolone sodium succinate injection 125 mg (125 mg Intravenous Given 11/2/22 1330)     Medical Decision Making:   Differential Diagnosis:   COPD exacerbation, URI, viral syndrome, influenza  ED Management:  Discussed case with  - will admit for COPD exacerbation, IV Solu-Medrol, treatments, continued monitoring                        Clinical Impression:   Final diagnoses:  [J44.1] COPD exacerbation (Primary)        ED Disposition Condition    Observation Stable                Afshin Bowden MD  11/02/22 9517

## 2022-11-03 VITALS
TEMPERATURE: 98 F | OXYGEN SATURATION: 96 % | WEIGHT: 148.69 LBS | SYSTOLIC BLOOD PRESSURE: 142 MMHG | DIASTOLIC BLOOD PRESSURE: 62 MMHG | HEIGHT: 59 IN | BODY MASS INDEX: 29.97 KG/M2 | RESPIRATION RATE: 18 BRPM | HEART RATE: 92 BPM

## 2022-11-03 PROBLEM — J44.1 COPD EXACERBATION: Status: ACTIVE | Noted: 2022-11-03

## 2022-11-03 LAB
ALBUMIN SERPL BCP-MCNC: 3 G/DL (ref 3.5–5.2)
ALP SERPL-CCNC: 43 U/L (ref 55–135)
ALT SERPL W/O P-5'-P-CCNC: 19 U/L (ref 10–44)
ANION GAP SERPL CALC-SCNC: 8 MMOL/L (ref 8–16)
AST SERPL-CCNC: 17 U/L (ref 10–40)
BASOPHILS # BLD AUTO: 0.01 K/UL (ref 0–0.2)
BASOPHILS NFR BLD: 0.1 % (ref 0–1.9)
BILIRUB SERPL-MCNC: 0.6 MG/DL (ref 0.1–1)
BUN SERPL-MCNC: 11 MG/DL (ref 8–23)
CALCIUM SERPL-MCNC: 8.2 MG/DL (ref 8.7–10.5)
CHLORIDE SERPL-SCNC: 94 MMOL/L (ref 95–110)
CO2 SERPL-SCNC: 26 MMOL/L (ref 23–29)
CREAT SERPL-MCNC: 0.6 MG/DL (ref 0.5–1.4)
DIFFERENTIAL METHOD: ABNORMAL
EOSINOPHIL # BLD AUTO: 0 K/UL (ref 0–0.5)
EOSINOPHIL NFR BLD: 0 % (ref 0–8)
ERYTHROCYTE [DISTWIDTH] IN BLOOD BY AUTOMATED COUNT: 13.1 % (ref 11.5–14.5)
EST. GFR  (NO RACE VARIABLE): >60 ML/MIN/1.73 M^2
GLUCOSE SERPL-MCNC: 280 MG/DL (ref 70–110)
HCT VFR BLD AUTO: 29.6 % (ref 37–48.5)
HGB BLD-MCNC: 10 G/DL (ref 12–16)
IMM GRANULOCYTES # BLD AUTO: 0.06 K/UL (ref 0–0.04)
IMM GRANULOCYTES NFR BLD AUTO: 0.4 % (ref 0–0.5)
LYMPHOCYTES # BLD AUTO: 0.8 K/UL (ref 1–4.8)
LYMPHOCYTES NFR BLD: 5.6 % (ref 18–48)
MCH RBC QN AUTO: 29.4 PG (ref 27–31)
MCHC RBC AUTO-ENTMCNC: 33.8 G/DL (ref 32–36)
MCV RBC AUTO: 87 FL (ref 82–98)
MONOCYTES # BLD AUTO: 0.3 K/UL (ref 0.3–1)
MONOCYTES NFR BLD: 2.2 % (ref 4–15)
NEUTROPHILS # BLD AUTO: 12.2 K/UL (ref 1.8–7.7)
NEUTROPHILS NFR BLD: 91.7 % (ref 38–73)
NRBC BLD-RTO: 0 /100 WBC
PLATELET # BLD AUTO: 178 K/UL (ref 150–450)
PMV BLD AUTO: 10.3 FL (ref 9.2–12.9)
POTASSIUM SERPL-SCNC: 3.7 MMOL/L (ref 3.5–5.1)
PROT SERPL-MCNC: 6.5 G/DL (ref 6–8.4)
RBC # BLD AUTO: 3.4 M/UL (ref 4–5.4)
SODIUM SERPL-SCNC: 128 MMOL/L (ref 136–145)
WBC # BLD AUTO: 13.35 K/UL (ref 3.9–12.7)

## 2022-11-03 PROCEDURE — 94761 N-INVAS EAR/PLS OXIMETRY MLT: CPT

## 2022-11-03 PROCEDURE — 90694 VACC AIIV4 NO PRSRV 0.5ML IM: CPT | Performed by: INTERNAL MEDICINE

## 2022-11-03 PROCEDURE — 25000242 PHARM REV CODE 250 ALT 637 W/ HCPCS: Performed by: EMERGENCY MEDICINE

## 2022-11-03 PROCEDURE — 63600175 PHARM REV CODE 636 W HCPCS: Performed by: INTERNAL MEDICINE

## 2022-11-03 PROCEDURE — 63600175 PHARM REV CODE 636 W HCPCS: Performed by: EMERGENCY MEDICINE

## 2022-11-03 PROCEDURE — 99900035 HC TECH TIME PER 15 MIN (STAT)

## 2022-11-03 PROCEDURE — 80053 COMPREHEN METABOLIC PANEL: CPT | Performed by: EMERGENCY MEDICINE

## 2022-11-03 PROCEDURE — 90471 IMMUNIZATION ADMIN: CPT | Performed by: INTERNAL MEDICINE

## 2022-11-03 PROCEDURE — 85025 COMPLETE CBC W/AUTO DIFF WBC: CPT | Performed by: EMERGENCY MEDICINE

## 2022-11-03 PROCEDURE — G0378 HOSPITAL OBSERVATION PER HR: HCPCS

## 2022-11-03 PROCEDURE — 96361 HYDRATE IV INFUSION ADD-ON: CPT

## 2022-11-03 PROCEDURE — 96366 THER/PROPH/DIAG IV INF ADDON: CPT

## 2022-11-03 PROCEDURE — 94799 UNLISTED PULMONARY SVC/PX: CPT

## 2022-11-03 PROCEDURE — 94640 AIRWAY INHALATION TREATMENT: CPT

## 2022-11-03 PROCEDURE — 97162 PT EVAL MOD COMPLEX 30 MIN: CPT

## 2022-11-03 PROCEDURE — 99900031 HC PATIENT EDUCATION (STAT)

## 2022-11-03 PROCEDURE — 36415 COLL VENOUS BLD VENIPUNCTURE: CPT | Performed by: EMERGENCY MEDICINE

## 2022-11-03 PROCEDURE — 96376 TX/PRO/DX INJ SAME DRUG ADON: CPT

## 2022-11-03 PROCEDURE — 25000003 PHARM REV CODE 250: Performed by: EMERGENCY MEDICINE

## 2022-11-03 PROCEDURE — G0008 ADMIN INFLUENZA VIRUS VAC: HCPCS | Performed by: INTERNAL MEDICINE

## 2022-11-03 RX ORDER — BUDESONIDE 0.5 MG/2ML
0.5 INHALANT ORAL 2 TIMES DAILY
Qty: 120 ML | Refills: 0 | Status: SHIPPED | OUTPATIENT
Start: 2022-11-03 | End: 2024-02-02

## 2022-11-03 RX ORDER — LEVOFLOXACIN 500 MG/1
500 TABLET, FILM COATED ORAL DAILY
Qty: 5 TABLET | Refills: 0 | Status: SHIPPED | OUTPATIENT
Start: 2022-11-03 | End: 2022-11-09

## 2022-11-03 RX ORDER — IPRATROPIUM BROMIDE AND ALBUTEROL SULFATE 2.5; .5 MG/3ML; MG/3ML
3 SOLUTION RESPIRATORY (INHALATION) EVERY 4 HOURS PRN
Qty: 75 ML | Refills: 0 | Status: SHIPPED | OUTPATIENT
Start: 2022-11-03 | End: 2022-11-29 | Stop reason: SDUPTHER

## 2022-11-03 RX ORDER — PREDNISONE 20 MG/1
20 TABLET ORAL 2 TIMES DAILY
Qty: 14 TABLET | Refills: 0 | Status: SHIPPED | OUTPATIENT
Start: 2022-11-03 | End: 2022-11-29 | Stop reason: ALTCHOICE

## 2022-11-03 RX ADMIN — FAMOTIDINE 20 MG: 20 TABLET ORAL at 08:11

## 2022-11-03 RX ADMIN — IPRATROPIUM BROMIDE AND ALBUTEROL SULFATE 3 ML: 2.5; .5 SOLUTION RESPIRATORY (INHALATION) at 10:11

## 2022-11-03 RX ADMIN — METHYLPREDNISOLONE SODIUM SUCCINATE 125 MG: 125 INJECTION, POWDER, FOR SOLUTION INTRAMUSCULAR; INTRAVENOUS at 01:11

## 2022-11-03 RX ADMIN — ATORVASTATIN CALCIUM 40 MG: 40 TABLET, FILM COATED ORAL at 08:11

## 2022-11-03 RX ADMIN — LEVOTHYROXINE SODIUM 100 MCG: 100 TABLET ORAL at 08:11

## 2022-11-03 RX ADMIN — INFLUENZA A VIRUS A/VICTORIA/2570/2019 IVR-215 (H1N1) ANTIGEN (FORMALDEHYDE INACTIVATED), INFLUENZA A VIRUS A/DARWIN/6/2021 IVR-227 (H3N2) ANTIGEN (FORMALDEHYDE INACTIVATED), INFLUENZA B VIRUS B/AUSTRIA/1359417/2021 BVR-26 ANTIGEN (FORMALDEHYDE INACTIVATED), INFLUENZA B VIRUS B/PHUKET/3073/2013 BVR-1B ANTIGEN (FORMALDEHYDE INACTIVATED) 0.5 ML: 15; 15; 15; 15 INJECTION, SUSPENSION INTRAMUSCULAR at 12:11

## 2022-11-03 RX ADMIN — DILTIAZEM HYDROCHLORIDE 120 MG: 120 CAPSULE, COATED, EXTENDED RELEASE ORAL at 08:11

## 2022-11-03 RX ADMIN — PIPERACILLIN AND TAZOBACTAM 4.5 G: 4; .5 INJECTION, POWDER, LYOPHILIZED, FOR SOLUTION INTRAVENOUS; PARENTERAL at 08:11

## 2022-11-03 RX ADMIN — IPRATROPIUM BROMIDE AND ALBUTEROL SULFATE 3 ML: 2.5; .5 SOLUTION RESPIRATORY (INHALATION) at 05:11

## 2022-11-03 RX ADMIN — ASPIRIN 81 MG: 81 TABLET, COATED ORAL at 08:11

## 2022-11-03 RX ADMIN — METHYLPREDNISOLONE SODIUM SUCCINATE 125 MG: 125 INJECTION, POWDER, FOR SOLUTION INTRAMUSCULAR; INTRAVENOUS at 08:11

## 2022-11-03 RX ADMIN — IPRATROPIUM BROMIDE AND ALBUTEROL SULFATE 3 ML: 2.5; .5 SOLUTION RESPIRATORY (INHALATION) at 12:11

## 2022-11-03 NOTE — ASSESSMENT & PLAN NOTE
Culminating in acute respiratory failure and COPD exacerbation.  Improvement noted with steroids.

## 2022-11-03 NOTE — NURSING
Patient called stating she was SOB, pulse ox 94% on room air, respirations 22, no distress noted. Auscultated crackles in bilateral lung bases. Encouraged patient to take slow, deep breaths and repositioned patient in bed. Patient more comfortable, will continue to monitor. Instructed to call with any needs or for assistance. Bed low, call bell in reach and side rails up x2 .

## 2022-11-03 NOTE — HPI
Chief Complaint   Patient presents with    Shortness of Breath       Patient reports being SOB. Reports that she has an upper respiratory infection but last night she began wheezing. Pt reports that she has a hx of COPD.       81-year-old female with a history of COPD presents to the emergency department with an upper respiratory tract infection exacerbating her COPD over the past 3 days.  Wheezing became pronounced last night.  Eating of cough.  No fever.  She is not ill appearing, alert oriented x4, GCS is 15.  Audible wheezes noted, no stridor.  Denies any chest pain.  No nausea vomiting diarrhea

## 2022-11-03 NOTE — PT/OT/SLP EVAL
Physical Therapy Evaluation    Patient Name:  Shanthi Can   MRN:  [id number]    Recommendations:     Discharge Recommendations:  home   Discharge Equipment Recommendations: none   Barriers to discharge: None    Assessment:     Shanthi Can is a 81 y.o. female admitted with a medical diagnosis of COPD exacerbation.  She presents with mild SOB following activity. Pt performing bed mobility and transfers independently. Pt ambulates 170' in room without AD with PT supervision. Patient is safe to ambulate in room. Pt is not appropriate for skilled PT services at this time 2/2 performing at [facility]. PT recommending discharge to home with family.      Recent Surgery: * No surgery found *      Plan:   Evaluation Only.    Plan of Care Expires:  11/03/22    Subjective     Chief Complaint: SOB  Patient/Family Comments/goals: Pt reports she plans to go home with her son.  Pain/Comfort:  Pain Rating 1: 0/10    Patients cultural, spiritual, Latter day conflicts given the current situation: no    Living Environment:  Pt lives alone in home with 3 steps to enter and bilateral handrails.  Prior to admission, patients level of function was Independent.  Equipment used at home: none.  DME owned (not currently used): single point cane and rollator .  Upon discharge, patient will have assistance from family.    Objective:     Communicated with RN prior to session.  Patient found HOB elevated with peripheral IV, telemetry, pulse ox (continuous)  upon PT entry to room.    General Precautions: Standard, fall   Orthopedic Precautions:N/A   Braces: N/A  Respiratory Status: Room air    Exams:  Cognitive Exam:  Patient is oriented to Person, Place, Time, and Situation  RLE ROM: WFL  RLE Strength: WFL  LLE ROM: WFL  LLE Strength: WFL    Functional Mobility:  Bed Mobility:     Rolling Right: independence  Supine to Sit: independence  Sit to Supine: independence  Transfers:     Sit to Stand:  independence with no AD  Gait: Pt ambulates  170ft in room without AD. No significant gait deviations or signs of instability. Pt with mild SOB following ambulation that resolves with seated rest break.      AM-PAC 6 CLICK MOBILITY  Total Score:24       Treatment & Education:  PT encouraging patient to walk in room throughout the day to prevent deconditioning while in the hospital.    Patient left sitting edge of bed with all lines intact, call button in reach, and family present.    GOALS:   Multidisciplinary Problems       Physical Therapy Goals       Not on file                    History:     Past Medical History:   Diagnosis Date    Arthritis     Change in bowel habit     COPD (chronic obstructive pulmonary disease)     COPD exacerbation 11/3/2022    Dyspnea     GERD (gastroesophageal reflux disease)     High cholesterol     Hx of pancreatitis     Hypertension     IFG (impaired fasting glucose)     Occlusion and stenosis of unspecified carotid artery     SOB (shortness of breath)     Thyroid disease     hypothyroidism       Past Surgical History:   Procedure Laterality Date    CHOLECYSTECTOMY  2017    COLONOSCOPY      DILATION AND CURETTAGE OF UTERUS      TENDON RELEASE Bilateral     GREAT TOES    TONSILLECTOMY         Time Tracking:     PT Received On: 11/03/22  PT Start Time: 1130     PT Stop Time: 1139  PT Total Time (min): 9 min     Billable Minutes: Evaluation 9 minutes      11/03/2022

## 2022-11-03 NOTE — PLAN OF CARE
Care plan reviewed with patient and family on COPD, activity, mediation, oxygen with verbal response

## 2022-11-03 NOTE — SUBJECTIVE & OBJECTIVE
Past Medical History:   Diagnosis Date    Arthritis     Change in bowel habit     COPD (chronic obstructive pulmonary disease)     Dyspnea     GERD (gastroesophageal reflux disease)     High cholesterol     Hx of pancreatitis     Hypertension     IFG (impaired fasting glucose)     Occlusion and stenosis of unspecified carotid artery     SOB (shortness of breath)     Thyroid disease     hypothyroidism       Past Surgical History:   Procedure Laterality Date    CHOLECYSTECTOMY  2017    COLONOSCOPY      DILATION AND CURETTAGE OF UTERUS      TENDON RELEASE Bilateral     GREAT TOES    TONSILLECTOMY         Review of patient's allergies indicates:  No Known Allergies    No current facility-administered medications on file prior to encounter.     Current Outpatient Medications on File Prior to Encounter   Medication Sig    ascorbic acid, vitamin C, (VITAMIN C) 500 MG tablet Take 500 mg by mouth once daily.    aspirin (ECOTRIN) 81 MG EC tablet Take 81 mg by mouth once daily. STOPPED 04/19/2022    azelastine 0.15 % (205.5 mcg) [place]     BREO ELLIPTA 100-25 mcg/dose diskus inhaler INHALE 1 PUFF EVERY DAY    calcium carbonate (OS-SARA) 600 mg calcium (1,500 mg) Tab Take 600 mg by mouth once.    coQ10, ubiquinol, 100 mg Cap Take 200 mg by mouth every evening.    diltiaZEM (CARDIZEM CD) 120 MG Cp24 Take 1 capsule (120 mg total) by mouth once daily.    fluticasone propionate (FLONASE) 50 mcg/actuation nasal spray USE 1 SPRAY IN EACH NOSTRIL EVERY DAY    gabapentin (NEURONTIN) 100 MG capsule Take 1 capsule (100 mg total) by mouth 2 (two) times daily.    glucosamine-chondroitin 500-400 mg tablet Take 1 tablet by mouth 3 (three) times daily.    levothyroxine (SYNTHROID) 100 MCG tablet TAKE 1 TABLET EVERY DAY    magnesium 250 mg Tab Take 400 mg by mouth every morning. 250mg QAM and 500mg QHS    multivitamin capsule Take 1 capsule by mouth once daily.    omega 3-dha-epa-fish oil (FISH OIL) 100-160-1,000 mg Cap Take by mouth.     oxyquinoline-sod.lauryl sulfat (TRIMO-DUGAN JELLY) 0.025-0.01 % Gel Place 1 Applicatorful vaginally twice a week.    pantoprazole (PROTONIX) 40 MG tablet TAKE 1 TABLET EVERY DAY    potassium 99 mg Tab Take by mouth once.    prednisolon/gatiflox/bromfenac (PREDNISOL ACE-GATIFLOX-BROMFEN) 1-0.5-0.075 % DrpS Apply 1 drop to eye 3 (three) times daily. in operative eye for 1 month after surgery (Patient not taking: Reported on 10/5/2022)    rosuvastatin (CRESTOR) 40 MG Tab TAKE 1 TABLET EVERY DAY     Family History       Problem Relation (Age of Onset)    Brain cancer Sister (50)    COPD Sister (49)    Diabetes Mother, Brother    Heart failure Mother (78)    Hypertension Father    Lung cancer Father (68), Sister, Brother (75)    No Known Problems Sister, Maternal Aunt, Maternal Uncle, Paternal Aunt, Paternal Uncle, Maternal Grandmother, Maternal Grandfather, Paternal Grandmother, Paternal Grandfather, Other          Tobacco Use    Smoking status: Former     Packs/day: 1.00     Years: 20.00     Pack years: 20.00     Types: Cigarettes     Quit date:      Years since quittin.8    Smokeless tobacco: Never   Substance and Sexual Activity    Alcohol use: Yes     Comment: OCCASSIONAL    Drug use: No    Sexual activity: Not Currently     Comment:      Review of Systems   Constitutional:  Negative for chills and fever.   HENT:  Negative for rhinorrhea, sneezing and sore throat.    Eyes:  Negative for pain and visual disturbance.   Respiratory:  Positive for shortness of breath and wheezing. Negative for cough.    Cardiovascular:  Negative for chest pain.   Gastrointestinal:  Negative for abdominal pain, constipation and diarrhea.   Endocrine: Negative for cold intolerance and heat intolerance.   Genitourinary:  Negative for difficulty urinating.   Musculoskeletal:  Negative for arthralgias and joint swelling.   Skin:  Negative for rash.   Allergic/Immunologic: Negative for environmental allergies.   Neurological:   Negative for dizziness, syncope and weakness.   Hematological:  Does not bruise/bleed easily.   Psychiatric/Behavioral:  Negative for dysphoric mood. The patient is not nervous/anxious.    Objective:     Vital Signs (Most Recent):  Temp: 98.3 °F (36.8 °C) (11/03/22 0706)  Pulse: 78 (11/03/22 1014)  Resp: 18 (11/03/22 1014)  BP: (!) 119/58 (11/03/22 0818)  SpO2: 98 % (11/03/22 1014)   Vital Signs (24h Range):  Temp:  [98 °F (36.7 °C)-98.7 °F (37.1 °C)] 98.3 °F (36.8 °C)  Pulse:  [75-95] 78  Resp:  [16-23] 18  SpO2:  [93 %-99 %] 98 %  BP: (116-147)/(56-81) 119/58     Weight: 67.4 kg (148 lb 11.2 oz)  Body mass index is 30.03 kg/m².    Physical Exam  Vitals and nursing note reviewed.   Constitutional:       Appearance: Normal appearance.   HENT:      Head: Normocephalic and atraumatic.      Nose: Nose normal.   Eyes:      Extraocular Movements: Extraocular movements intact.      Pupils: Pupils are equal, round, and reactive to light.   Cardiovascular:      Rate and Rhythm: Normal rate and regular rhythm.      Heart sounds: No murmur heard.    No friction rub. No gallop.   Pulmonary:      Effort: Pulmonary effort is normal.      Breath sounds: Wheezing present.   Abdominal:      General: Abdomen is flat. Bowel sounds are normal.      Palpations: Abdomen is soft.   Musculoskeletal:         General: No swelling or deformity.      Cervical back: Normal range of motion and neck supple.   Skin:     General: Skin is warm and dry.      Capillary Refill: Capillary refill takes less than 2 seconds.   Neurological:      General: No focal deficit present.      Mental Status: She is alert and oriented to person, place, and time.   Psychiatric:         Mood and Affect: Mood normal.         Behavior: Behavior normal.         CRANIAL NERVES     CN III, IV, VI   Pupils are equal, round, and reactive to light.     Significant Labs: All pertinent labs within the past 24 hours have been reviewed.    Significant Imaging: I have reviewed  all pertinent imaging results/findings within the past 24 hours.

## 2022-11-03 NOTE — RESPIRATORY THERAPY
11/03/22 0812   Home Oxygen Qualification   $ Home O2 Qualification Tech time 15 minutes   Room Air SpO2 At Rest 96 %   Room Air SpO2 During Ambulation 92 %   SpO2 Post Ambulation 93 %  (on room air)   Post Ambulation Heart Rate 109 bpm       Patient remains on room air post Home Oxygen Evaluation. Akiko VENEGAS and Vipul RN with Case Management notified

## 2022-11-03 NOTE — ASSESSMENT & PLAN NOTE
Continue home medical therapy as indicated.    BP Readings from Last 3 Encounters:   11/03/22 (!) 119/58   10/05/22 124/69   08/26/22 128/76

## 2022-11-03 NOTE — PLAN OF CARE
"[facility]  Initial Discharge Assessment       Primary Care Provider: Mika Rascon Jr, MD    Admission Diagnosis: COPD exacerbation [J44.1]    Admission Date: 11/2/2022  Expected Discharge Date:     Discharge Barriers Identified: None    Payor: HU[employer] MANAGED MEDICARE / Plan: HU[employer] MEDICARE PPO / Product Type: Medicare Advantage /     Extended Emergency Contact Information  Primary Emergency Contact: Casimiro Reyes   [facility]  Home Phone: [phone]  Relation: Son    Discharge Plan A: Home, Home with family  Discharge Plan B: Home, Home with family      [facility] - [facility] [address]  [address] [facility] [address]  Phone: [phone] Fax: [phone]    [facility] [address] [place] [address]  Phone: [phone] Fax: [phone]    [facility] [place], [place] - [address]  [address]  [facility] [address]  Phone: [phone] Fax: [phone]      Initial Assessment (most recent)       Adult Discharge Assessment - 11/03/22 0825          Discharge Assessment    Assessment Type Discharge Planning Assessment     Confirmed/corrected address, phone number and insurance Yes     Confirmed Demographics Correct on Facesheet     Source of Information patient     When was your last doctors appointment? --   "About 6 monts ago."    Does patient/caregiver understand observation status Yes     Communicated VICKY with patient/caregiver Yes     Reason For Admission SOB     Lives With alone     Do you expect to return to your current living situation? Other (see comments)   Will be moving with her son Casimiro to [place].    Prior to hospitilization cognitive status: Alert/Oriented     Current cognitive status: Alert/Oriented     Walking or Climbing Stairs Difficulty none     Dressing/Bathing Difficulty none     Equipment Currently Used at Home none   Has rolling walker " but does not use it.    Readmission within 30 days? No     Patient currently being followed by outpatient case management? No     Do you currently have service(s) that help you manage your care at home? No     Do you take prescription medications? Yes     Do you have prescription coverage? No     Do you have any problems affording any of your prescribed medications? No     Is the patient taking medications as prescribed? yes     Who is going to help you get home at discharge? Son Casimiro Can     How do you get to doctors appointments? car, drives self     Are you on dialysis? No     Do you take coumadin? No     Discharge Plan A Home;Home with family     Discharge Plan B Home;Home with family     DME Needed Upon Discharge  none     Discharge Plan discussed with: Patient;Adult children     Discharge Barriers Identified None                      Assessment completed with patient and family.  No needs noted.  CM to remain in case until discharge.

## 2022-11-03 NOTE — DISCHARGE SUMMARY
[facility] Medicine  Discharge Summary      Patient Name: Shanthi Can  MRN: [id number]  ANGELES: [id number]  Patient Class: OP- Observation  Admission Date: 11/2/2022  Hospital Length of Stay: 0 days  Discharge Date and Time:  11/03/2022 10:58 AM  Attending Physician: Mika Rascon Jr., MD   Discharging Provider: Mika Rascon Jr, MD  Primary Care Provider: Mika Rascon Jr, MD    Primary Care Team: Networked reference to record PCT     HPI:   Chief Complaint   Patient presents with    Shortness of Breath       Patient reports being SOB. Reports that she has an upper respiratory infection but last night she began wheezing. Pt reports that she has a hx of COPD.       81-year-old female with a history of COPD presents to the emergency department with an upper respiratory tract infection exacerbating her COPD over the past 3 days.  Wheezing became pronounced last night.  Eating of cough.  No fever.  She is not ill appearing, alert oriented x4, GCS is 15.  Audible wheezes noted, no stridor.  Denies any chest pain.  No nausea vomiting diarrhea         * No surgery found *      Hospital Course:   Significant improvement noted with nebulized therapies in steroids appears discharge with close outpatient follow-up.  Patient now ambulating without desaturations.       Goals of Care Treatment Preferences:  Code Status: Full Code      Consults:     No new Assessment & Plan notes have been filed under this hospital service since the last note was generated.  Service: Hospital Medicine    Final Active Diagnoses:    Diagnosis Date Noted POA    PRINCIPAL PROBLEM:  COPD exacerbation [J44.1] 11/03/2022 Unknown    Viral upper respiratory tract infection [J06.9] 11/02/2022 Yes    Essential hypertension [I10] 06/23/2021 Yes    Hypothyroidism [E03.9] 06/23/2021 Yes    Hyperlipidemia [E78.5] 04/15/2021 Yes      Problems Resolved During this Admission:       Discharged Condition: good    Disposition:      Follow Up:    Patient Instructions:      Home O2 eval (desaturation screen)       Significant Diagnostic Studies: Labs: All labs within the past 24 hours have been reviewed    Pending Diagnostic Studies:     None         Medications:  Reconciled Home Medications:      Medication List      ASK your doctor about these medications    ascorbic acid (vitamin C) 500 MG tablet  Commonly known as: VITAMIN C  Take 500 mg by mouth once daily.     aspirin 81 MG EC tablet  Commonly known as: ECOTRIN  Take 81 mg by mouth once daily. STOPPED 04/19/2022     azelastine 205.5 mcg (0.15 %) [place]     BREO ELLIPTA 100-25 mcg/dose diskus inhaler  Generic drug: fluticasone furoate-vilanteroL  INHALE 1 PUFF EVERY DAY     calcium carbonate 600 mg calcium (1,500 mg) Tab  Commonly known as: OS-SARA  Take 600 mg by mouth once.     coQ10 (ubiquinol) 100 mg Cap  Take 200 mg by mouth every evening.     diltiaZEM 120 MG Cp24  Commonly known as: CARDIZEM CD  Take 1 capsule (120 mg total) by mouth once daily.     Fish OiL 100-160-1,000 mg Cap  Generic drug: omega 3-dha-epa-fish oil  Take by mouth.     fluticasone propionate 50 mcg/actuation nasal spray  Commonly known as: FLONASE  USE 1 SPRAY IN EACH NOSTRIL EVERY DAY     gabapentin 100 MG capsule  Commonly known as: NEURONTIN  Take 1 capsule (100 mg total) by mouth 2 (two) times daily.     glucosamine-chondroitin 500-400 mg tablet  Take 1 tablet by mouth 3 (three) times daily.     levothyroxine 100 MCG tablet  Commonly known as: SYNTHROID  TAKE 1 TABLET EVERY DAY     magnesium 250 mg Tab  Take 400 mg by mouth every morning. 250mg QAM and 500mg QHS     multivitamin capsule  Take 1 capsule by mouth once daily.     pantoprazole 40 MG tablet  Commonly known as: PROTONIX  TAKE 1 TABLET EVERY DAY     potassium 99 mg Tab  Take by mouth once.     prednisol ace-gatiflox-bromfen 1-0.5-0.075 % Drps  Apply 1 drop to eye 3 (three) times daily. in operative eye for 1 month after surgery     rosuvastatin 40 MG  Tab  Commonly known as: CRESTOR  TAKE 1 TABLET EVERY DAY     TRIMO-DUGAN JELLY 0.025-0.01 % Gel  Generic drug: oxyquinoline-sod.lauryl sulfat  Place 1 Applicatorful vaginally twice a week.            Indwelling Lines/Drains at time of discharge:   Lines/Drains/Airways     None                 Time spent on the discharge of patient: 60 minutes         Mika Rascon Jr, MD  Department of Hospital Medicine  [facility]

## 2022-11-03 NOTE — HOSPITAL COURSE
Significant improvement noted with nebulized therapies in steroids appears discharge with close outpatient follow-up.  Patient now ambulating without desaturations.

## 2022-11-03 NOTE — PLAN OF CARE
11/03/22 0816   JORGE Message   Medicare Outpatient and Observation Notification regarding financial responsibility Given to patient/caregiver;Explained to patient/caregiver;Signed/date by patient/caregiver   Date JORGE was signed 11/03/22   Time JORGE was signed 0808     JORGE discussed with patient and family.  Copy provided and documented.  Voiced understanding.

## 2022-11-03 NOTE — H&P
[facility] Medicine  History & Physical    Patient Name: Shanthi Can  MRN: [id number]  Patient Class: OP- Observation  Admission Date: 11/2/2022  Attending Physician: Mika Rascon Jr., MD   Primary Care Provider: Mika Rascon Jr, MD         Patient information was obtained from ER records.     Subjective:     Principal Problem:<principal problem not specified>    Chief Complaint:   Chief Complaint   Patient presents with    Shortness of Breath     Patient reports being SOB. Reports that she has an upper respiratory infection but last night she began wheezing. Pt reports that she has a hx of COPD.         HPI:   Chief Complaint   Patient presents with    Shortness of Breath       Patient reports being SOB. Reports that she has an upper respiratory infection but last night she began wheezing. Pt reports that she has a hx of COPD.       81-year-old female with a history of COPD presents to the emergency department with an upper respiratory tract infection exacerbating her COPD over the past 3 days.  Wheezing became pronounced last night.  Eating of cough.  No fever.  She is not ill appearing, alert oriented x4, GCS is 15.  Audible wheezes noted, no stridor.  Denies any chest pain.  No nausea vomiting diarrhea         Past Medical History:   Diagnosis Date    Arthritis     Change in bowel habit     COPD (chronic obstructive pulmonary disease)     Dyspnea     GERD (gastroesophageal reflux disease)     High cholesterol     Hx of pancreatitis     Hypertension     IFG (impaired fasting glucose)     Occlusion and stenosis of unspecified carotid artery     SOB (shortness of breath)     Thyroid disease     hypothyroidism       Past Surgical History:   Procedure Laterality Date    CHOLECYSTECTOMY  2017    COLONOSCOPY      DILATION AND CURETTAGE OF UTERUS      TENDON RELEASE Bilateral     GREAT TOES    TONSILLECTOMY         Review of patient's allergies indicates:  No Known  Allergies    No current facility-administered medications on file prior to encounter.     Current Outpatient Medications on File Prior to Encounter   Medication Sig    ascorbic acid, vitamin C, (VITAMIN C) 500 MG tablet Take 500 mg by mouth once daily.    aspirin (ECOTRIN) 81 MG EC tablet Take 81 mg by mouth once daily. STOPPED 04/19/2022    azelastine 0.15 % (205.5 mcg) Spry     BREO ELLIPTA 100-25 mcg/dose diskus inhaler INHALE 1 PUFF EVERY DAY    calcium carbonate (OS-SARA) 600 mg calcium (1,500 mg) Tab Take 600 mg by mouth once.    coQ10, ubiquinol, 100 mg Cap Take 200 mg by mouth every evening.    diltiaZEM (CARDIZEM CD) 120 MG Cp24 Take 1 capsule (120 mg total) by mouth once daily.    fluticasone propionate (FLONASE) 50 mcg/actuation nasal spray USE 1 SPRAY IN EACH NOSTRIL EVERY DAY    gabapentin (NEURONTIN) 100 MG capsule Take 1 capsule (100 mg total) by mouth 2 (two) times daily.    glucosamine-chondroitin 500-400 mg tablet Take 1 tablet by mouth 3 (three) times daily.    levothyroxine (SYNTHROID) 100 MCG tablet TAKE 1 TABLET EVERY DAY    magnesium 250 mg Tab Take 400 mg by mouth every morning. 250mg QAM and 500mg QHS    multivitamin capsule Take 1 capsule by mouth once daily.    omega 3-dha-epa-fish oil (FISH OIL) 100-160-1,000 mg Cap Take by mouth.    oxyquinoline-sod.lauryl sulfat (TRIMO-DUGAN JELLY) 0.025-0.01 % Gel Place 1 Applicatorful vaginally twice a week.    pantoprazole (PROTONIX) 40 MG tablet TAKE 1 TABLET EVERY DAY    potassium 99 mg Tab Take by mouth once.    prednisolon/gatiflox/bromfenac (PREDNISOL ACE-GATIFLOX-BROMFEN) 1-0.5-0.075 % DrpS Apply 1 drop to eye 3 (three) times daily. in operative eye for 1 month after surgery (Patient not taking: Reported on 10/5/2022)    rosuvastatin (CRESTOR) 40 MG Tab TAKE 1 TABLET EVERY DAY     Family History       Problem Relation (Age of Onset)    Brain cancer Sister (50)    COPD Sister (49)    Diabetes Mother, Brother    Heart failure  Mother (78)    Hypertension Father    Lung cancer Father (68), Sister, Brother (75)    No Known Problems Sister, Maternal Aunt, Maternal Uncle, Paternal Aunt, Paternal Uncle, Maternal Grandmother, Maternal Grandfather, Paternal Grandmother, Paternal Grandfather, Other          Tobacco Use    Smoking status: Former     Packs/day: 1.00     Years: 20.00     Pack years: 20.00     Types: Cigarettes     Quit date:      Years since quittin.8    Smokeless tobacco: Never   Substance and Sexual Activity    Alcohol use: Yes     Comment: OCCASSIONAL    Drug use: No    Sexual activity: Not Currently     Comment:      Review of Systems   Constitutional:  Negative for chills and fever.   HENT:  Negative for rhinorrhea, sneezing and sore throat.    Eyes:  Negative for pain and visual disturbance.   Respiratory:  Positive for shortness of breath and wheezing. Negative for cough.    Cardiovascular:  Negative for chest pain.   Gastrointestinal:  Negative for abdominal pain, constipation and diarrhea.   Endocrine: Negative for cold intolerance and heat intolerance.   Genitourinary:  Negative for difficulty urinating.   Musculoskeletal:  Negative for arthralgias and joint swelling.   Skin:  Negative for rash.   Allergic/Immunologic: Negative for environmental allergies.   Neurological:  Negative for dizziness, syncope and weakness.   Hematological:  Does not bruise/bleed easily.   Psychiatric/Behavioral:  Negative for dysphoric mood. The patient is not nervous/anxious.    Objective:     Vital Signs (Most Recent):  Temp: 98.3 °F (36.8 °C) (22 0706)  Pulse: 78 (22 1014)  Resp: 18 (22 1014)  BP: (!) 119/58 (22 0818)  SpO2: 98 % (22 1014)   Vital Signs (24h Range):  Temp:  [98 °F (36.7 °C)-98.7 °F (37.1 °C)] 98.3 °F (36.8 °C)  Pulse:  [75-95] 78  Resp:  [16-23] 18  SpO2:  [93 %-99 %] 98 %  BP: (116-147)/(56-81) 119/58     Weight: 67.4 kg (148 lb 11.2 oz)  Body mass index is 30.03  kg/m².    Physical Exam  Vitals and nursing note reviewed.   Constitutional:       Appearance: Normal appearance.   HENT:      Head: Normocephalic and atraumatic.      Nose: Nose normal.   Eyes:      Extraocular Movements: Extraocular movements intact.      Pupils: Pupils are equal, round, and reactive to light.   Cardiovascular:      Rate and Rhythm: Normal rate and regular rhythm.      Heart sounds: No murmur heard.    No friction rub. No gallop.   Pulmonary:      Effort: Pulmonary effort is normal.      Breath sounds: Wheezing present.   Abdominal:      General: Abdomen is flat. Bowel sounds are normal.      Palpations: Abdomen is soft.   Musculoskeletal:         General: No swelling or deformity.      Cervical back: Normal range of motion and neck supple.   Skin:     General: Skin is warm and dry.      Capillary Refill: Capillary refill takes less than 2 seconds.   Neurological:      General: No focal deficit present.      Mental Status: She is alert and oriented to person, place, and time.   Psychiatric:         Mood and Affect: Mood normal.         Behavior: Behavior normal.         CRANIAL NERVES     CN III, IV, VI   Pupils are equal, round, and reactive to light.     Significant Labs: All pertinent labs within the past 24 hours have been reviewed.    Significant Imaging: I have reviewed all pertinent imaging results/findings within the past 24 hours.    Assessment/Plan:     COPD exacerbation  Exacerbated by recent upper respiratory infection.  Continue steroids and nebulized therapies.  Overall the patient has improved.      Viral upper respiratory tract infection  Culminating in acute respiratory failure and COPD exacerbation.  Improvement noted with steroids.      Essential hypertension  Continue home medical therapy as indicated.    BP Readings from Last 3 Encounters:   11/03/22 (!) 119/58   10/05/22 124/69   08/26/22 128/76           Hypothyroidism  Continue home dose of  Synthroid.      Hyperlipidemia  Continue home dose of statin.        VTE Risk Mitigation (From admission, onward)         Ordered     IP VTE HIGH RISK PATIENT  Once         11/02/22 1711     Place sequential compression device  Until discontinued         11/02/22 1711     Place STONEY hose  Until discontinued         11/02/22 1711                   Mika Rascon Jr, MD  Department of Hospital Medicine   [facility]

## 2022-11-03 NOTE — NURSING
Patient sat on side of bed with distress noted, pure wick on but leaking on bed and removed, patient voided in bed side commode

## 2022-11-03 NOTE — NURSING
Saline lock removed intact with out edema or redness noted, telemetry removed, discharge orders given to patient son

## 2022-11-03 NOTE — ASSESSMENT & PLAN NOTE
Exacerbated by recent upper respiratory infection.  Continue steroids and nebulized therapies.  Overall the patient has improved.

## 2022-11-04 ENCOUNTER — TELEPHONE (OUTPATIENT)
Dept: OPHTHALMOLOGY | Facility: CLINIC | Age: 82
End: 2022-11-04
Payer: MEDICARE

## 2022-11-04 NOTE — TELEPHONE ENCOUNTER
----- Message from Annalisa Lora sent at 11/4/2022 11:44 AM CDT -----  Regarding: Cancel Appt  Pt is calling in to cancel procedure that's scheduled for right eye on 11/10/22.      Contact: Shanthi [phone]

## 2022-11-07 ENCOUNTER — TELEPHONE (OUTPATIENT)
Dept: OPHTHALMOLOGY | Facility: CLINIC | Age: 82
End: 2022-11-07
Payer: MEDICARE

## 2022-11-07 NOTE — TELEPHONE ENCOUNTER
Patient given arrival time of 9:30 am on Thursday November 10. Nothing to eat or drink after 9 pm.  Water, Gatorade after 9 pm until leaves home.  Start drops into the operative eye today. [address]

## 2022-11-29 PROBLEM — E66.9 OBESITY: Status: ACTIVE | Noted: 2022-11-29

## 2022-11-29 PROBLEM — E11.9 TYPE II DIABETES MELLITUS: Status: ACTIVE | Noted: 2021-06-23

## 2022-11-29 PROBLEM — Z00.00 ROUTINE GENERAL MEDICAL EXAMINATION AT A HEALTH CARE FACILITY: Status: ACTIVE | Noted: 2022-11-29

## 2023-01-03 ENCOUNTER — OFFICE VISIT (OUTPATIENT)
Dept: OBSTETRICS AND GYNECOLOGY | Facility: CLINIC | Age: 83
End: 2023-01-03
Payer: MEDICARE

## 2023-01-03 VITALS — BODY MASS INDEX: 29.89 KG/M2 | WEIGHT: 148 LBS | DIASTOLIC BLOOD PRESSURE: 76 MMHG | SYSTOLIC BLOOD PRESSURE: 132 MMHG

## 2023-01-03 DIAGNOSIS — N39.3 STRESS INCONTINENCE: ICD-10-CM

## 2023-01-03 DIAGNOSIS — N81.10 PROLAPSE OF ANTERIOR VAGINAL WALL: ICD-10-CM

## 2023-01-03 DIAGNOSIS — Z46.89 PESSARY MAINTENANCE: ICD-10-CM

## 2023-01-03 DIAGNOSIS — N81.11 CYSTOCELE, MIDLINE: Primary | ICD-10-CM

## 2023-01-03 PROCEDURE — 1159F MED LIST DOCD IN RCRD: CPT | Mod: CPTII,S$GLB,, | Performed by: OBSTETRICS & GYNECOLOGY

## 2023-01-03 PROCEDURE — 1160F PR REVIEW ALL MEDS BY PRESCRIBER/CLIN PHARMACIST DOCUMENTED: ICD-10-PCS | Mod: CPTII,S$GLB,, | Performed by: OBSTETRICS & GYNECOLOGY

## 2023-01-03 PROCEDURE — 99999 PR PBB SHADOW E&M-EST. PATIENT-LVL III: CPT | Mod: PBBFAC,,, | Performed by: OBSTETRICS & GYNECOLOGY

## 2023-01-03 PROCEDURE — 3078F PR MOST RECENT DIASTOLIC BLOOD PRESSURE < 80 MM HG: ICD-10-PCS | Mod: CPTII,S$GLB,, | Performed by: OBSTETRICS & GYNECOLOGY

## 2023-01-03 PROCEDURE — 99213 OFFICE O/P EST LOW 20 MIN: CPT | Mod: S$GLB,,, | Performed by: OBSTETRICS & GYNECOLOGY

## 2023-01-03 PROCEDURE — 99213 PR OFFICE/OUTPT VISIT, EST, LEVL III, 20-29 MIN: ICD-10-PCS | Mod: S$GLB,,, | Performed by: OBSTETRICS & GYNECOLOGY

## 2023-01-03 PROCEDURE — 1159F PR MEDICATION LIST DOCUMENTED IN MEDICAL RECORD: ICD-10-PCS | Mod: CPTII,S$GLB,, | Performed by: OBSTETRICS & GYNECOLOGY

## 2023-01-03 PROCEDURE — 3075F SYST BP GE 130 - 139MM HG: CPT | Mod: CPTII,S$GLB,, | Performed by: OBSTETRICS & GYNECOLOGY

## 2023-01-03 PROCEDURE — 1160F RVW MEDS BY RX/DR IN RCRD: CPT | Mod: CPTII,S$GLB,, | Performed by: OBSTETRICS & GYNECOLOGY

## 2023-01-03 PROCEDURE — 3078F DIAST BP <80 MM HG: CPT | Mod: CPTII,S$GLB,, | Performed by: OBSTETRICS & GYNECOLOGY

## 2023-01-03 PROCEDURE — 3075F PR MOST RECENT SYSTOLIC BLOOD PRESS GE 130-139MM HG: ICD-10-PCS | Mod: CPTII,S$GLB,, | Performed by: OBSTETRICS & GYNECOLOGY

## 2023-01-03 PROCEDURE — 99999 PR PBB SHADOW E&M-EST. PATIENT-LVL III: ICD-10-PCS | Mod: PBBFAC,,, | Performed by: OBSTETRICS & GYNECOLOGY

## 2023-01-03 NOTE — PROGRESS NOTES
Subjective:    Patient ID: Shanthi Can is a 82 y.o. y.o. female.     Chief Complaint:   Chief Complaint   Patient presents with    Follow-up       History of Present Illness:   Shanthi presents for a pessary check. She has been doing well wearing the pessary and has no complaints of bleeding or pain.    MEDICATION LIST    Current Outpatient Medications:     albuterol-ipratropium (DUO-NEB) 2.5 mg-0.5 mg/3 mL nebulizer solution, Take 3 mLs by nebulization every 4 (four) hours as needed for Wheezing or Shortness of Breath. Rescue, Disp: 75 mL, Rfl: 0    ascorbic acid, vitamin C, (VITAMIN C) 500 MG tablet, Take 500 mg by mouth once daily., Disp: , Rfl:     aspirin (ECOTRIN) 81 MG EC tablet, Take 81 mg by mouth once daily. STOPPED 04/19/2022, Disp: , Rfl:     azelastine 0.15 % (205.5 mcg) Mark, , Disp: , Rfl:     budesonide (PULMICORT) 0.5 mg/2 mL nebulizer solution, Take 2 mLs (0.5 mg total) by nebulization 2 (two) times a day. Controller, Disp: 120 mL, Rfl: 0    calcium carbonate (OS-SARA) 600 mg calcium (1,500 mg) Tab, Take 600 mg by mouth once., Disp: , Rfl:     coQ10, ubiquinol, 100 mg Cap, Take 200 mg by mouth every evening., Disp: 180 capsule, Rfl: 2    diltiaZEM (CARDIZEM CD) 120 MG Cp24, Take 1 capsule (120 mg total) by mouth once daily., Disp: 90 capsule, Rfl: 3    fluticasone propionate (XHANCE NASL), 1 spray by Nasal route 2 (two) times a day. Prescribed by ENT, Disp: , Rfl:     fluticasone-umeclidin-vilanter (TRELEGY ELLIPTA) 100-62.5-25 mcg DsDv, Inhale 1 puff into the lungs once daily., Disp: 28 each, Rfl: 5    gabapentin (NEURONTIN) 100 MG capsule, TAKE 1 CAPSULE TWICE DAILY, Disp: 180 capsule, Rfl: 1    glucosamine-chondroitin 500-400 mg tablet, Take 1 tablet by mouth 3 (three) times daily., Disp: , Rfl:     guaiFENesin (MUCINEX) 600 mg 12 hr tablet, Take 1,200 mg by mouth 2 (two) times daily., Disp: , Rfl:     levothyroxine (SYNTHROID) 100 MCG tablet, TAKE 1 TABLET EVERY DAY, Disp: 90 tablet,  Rfl: 1    magnesium 250 mg Tab, Take 400 mg by mouth every morning. 250mg QAM and 500mg QHS, Disp: , Rfl:     multivitamin capsule, Take 1 capsule by mouth once daily., Disp: , Rfl:     omega 3-dha-epa-fish oil (FISH OIL) 100-160-1,000 mg Cap, Take by mouth., Disp: , Rfl:     oxyquinoline-sod.lauryl sulfat (TRIMO-DUGAN JELLY) 0.025-0.01 % Gel, Place 1 Applicatorful vaginally twice a week., Disp: 113.4 g, Rfl: 2    pantoprazole (PROTONIX) 40 MG tablet, TAKE 1 TABLET EVERY DAY, Disp: 90 tablet, Rfl: 1    potassium 99 mg Tab, Take by mouth once., Disp: , Rfl:     prednisolon/gatiflox/bromfenac (PREDNISOL ACE-GATIFLOX-BROMFEN) 1-0.5-0.075 % DrpS, Apply 1 drop to eye 3 (three) times daily. in operative eye for 1 month after surgery, Disp: 5 mL, Rfl: 3    rosuvastatin (CRESTOR) 40 MG Tab, TAKE 1 TABLET EVERY DAY, Disp: 90 tablet, Rfl: 1    PRE-PESSARY CHECK COUNSELING:  The patient was informed of the risks of pain or discomfort, continuous incontinence, urinary retention with insertion of a pessary and malodorous discharge and/or possible bleeding from granulation tissue after wearing the pessary for sometime. She was counseled on the alternatives to pessary insertion, including surgery or no treatment with continued symptoms, and she agrees to proceed.    PROCEDURE:  TIME OUT PERFORMED.  The pessary was removed and cleaned with soap and water.  There was no granulation tissue present. There is a mild area of erythema along the vaginal wall at the site of the pessary but no ulceration or granulation was noted.  There was a slight discharge present.  The pessary was re-inserted.  The patient was able to sit, stand, walk and either cough or urinate on the toilet after the procedure normally without expelling the pessary. The patient tolerated the procedure well.      ASSESSMENT:      1. Cystocele, midline    2. Prolapse of anterior vaginal wall    3. Stress incontinence    4. Pessary maintenance        POST PESSARY CHECK  COUNSELING:  Report worsening urinary incontinency, urinary retention, pain, fever, foul smelling discharge or bleeding.  Importance of keeping follow-up appointments for a pessary check stressed.    Counseling lasted approximately 10 minutes and all her questions were answered.    FOLLOW-UP: In 3 months for pessary check.      Karen Mota MD FACOG    01/03/2023  11:30 AM

## 2023-02-01 PROBLEM — I47.10 PSVT (PAROXYSMAL SUPRAVENTRICULAR TACHYCARDIA): Status: ACTIVE | Noted: 2023-02-01

## 2023-02-09 PROBLEM — J06.9 VIRAL UPPER RESPIRATORY TRACT INFECTION: Status: RESOLVED | Noted: 2022-11-02 | Resolved: 2023-02-09

## 2023-03-06 PROBLEM — Z00.00 ROUTINE GENERAL MEDICAL EXAMINATION AT A HEALTH CARE FACILITY: Status: RESOLVED | Noted: 2022-11-29 | Resolved: 2023-03-06

## 2023-03-22 ENCOUNTER — OFFICE VISIT (OUTPATIENT)
Dept: OPHTHALMOLOGY | Facility: CLINIC | Age: 83
End: 2023-03-22
Attending: OPHTHALMOLOGY
Payer: MEDICARE

## 2023-03-22 DIAGNOSIS — H25.13 NUCLEAR SCLEROTIC CATARACT, BILATERAL: Primary | ICD-10-CM

## 2023-03-22 DIAGNOSIS — H25.13 NUCLEAR SCLEROSIS, BILATERAL: ICD-10-CM

## 2023-03-22 PROCEDURE — 92136 IOL MASTER - OU - BOTH EYES: ICD-10-PCS | Mod: LT,S$GLB,, | Performed by: OPHTHALMOLOGY

## 2023-03-22 PROCEDURE — 99214 OFFICE O/P EST MOD 30 MIN: CPT | Mod: S$GLB,,, | Performed by: OPHTHALMOLOGY

## 2023-03-22 PROCEDURE — 92136 OPHTHALMIC BIOMETRY: CPT | Mod: LT,S$GLB,, | Performed by: OPHTHALMOLOGY

## 2023-03-22 PROCEDURE — 1159F MED LIST DOCD IN RCRD: CPT | Mod: CPTII,S$GLB,, | Performed by: OPHTHALMOLOGY

## 2023-03-22 PROCEDURE — 1159F PR MEDICATION LIST DOCUMENTED IN MEDICAL RECORD: ICD-10-PCS | Mod: CPTII,S$GLB,, | Performed by: OPHTHALMOLOGY

## 2023-03-22 PROCEDURE — 1101F PR PT FALLS ASSESS DOC 0-1 FALLS W/OUT INJ PAST YR: ICD-10-PCS | Mod: CPTII,S$GLB,, | Performed by: OPHTHALMOLOGY

## 2023-03-22 PROCEDURE — 1126F AMNT PAIN NOTED NONE PRSNT: CPT | Mod: CPTII,S$GLB,, | Performed by: OPHTHALMOLOGY

## 2023-03-22 PROCEDURE — 3288F PR FALLS RISK ASSESSMENT DOCUMENTED: ICD-10-PCS | Mod: CPTII,S$GLB,, | Performed by: OPHTHALMOLOGY

## 2023-03-22 PROCEDURE — 1126F PR PAIN SEVERITY QUANTIFIED, NO PAIN PRESENT: ICD-10-PCS | Mod: CPTII,S$GLB,, | Performed by: OPHTHALMOLOGY

## 2023-03-22 PROCEDURE — 1101F PT FALLS ASSESS-DOCD LE1/YR: CPT | Mod: CPTII,S$GLB,, | Performed by: OPHTHALMOLOGY

## 2023-03-22 PROCEDURE — 1160F RVW MEDS BY RX/DR IN RCRD: CPT | Mod: CPTII,S$GLB,, | Performed by: OPHTHALMOLOGY

## 2023-03-22 PROCEDURE — 99999 PR PBB SHADOW E&M-EST. PATIENT-LVL IV: ICD-10-PCS | Mod: PBBFAC,,, | Performed by: OPHTHALMOLOGY

## 2023-03-22 PROCEDURE — 1160F PR REVIEW ALL MEDS BY PRESCRIBER/CLIN PHARMACIST DOCUMENTED: ICD-10-PCS | Mod: CPTII,S$GLB,, | Performed by: OPHTHALMOLOGY

## 2023-03-22 PROCEDURE — 99214 PR OFFICE/OUTPT VISIT, EST, LEVL IV, 30-39 MIN: ICD-10-PCS | Mod: S$GLB,,, | Performed by: OPHTHALMOLOGY

## 2023-03-22 PROCEDURE — 3288F FALL RISK ASSESSMENT DOCD: CPT | Mod: CPTII,S$GLB,, | Performed by: OPHTHALMOLOGY

## 2023-03-22 PROCEDURE — 99999 PR PBB SHADOW E&M-EST. PATIENT-LVL IV: CPT | Mod: PBBFAC,,, | Performed by: OPHTHALMOLOGY

## 2023-03-22 RX ORDER — MOXIFLOXACIN 5 MG/ML
1 SOLUTION/ DROPS OPHTHALMIC
Status: CANCELLED | OUTPATIENT
Start: 2023-03-22

## 2023-03-22 RX ORDER — TROPICAMIDE 10 MG/ML
1 SOLUTION/ DROPS OPHTHALMIC
Status: CANCELLED | OUTPATIENT
Start: 2023-03-22

## 2023-03-22 RX ORDER — PHENYLEPHRINE HYDROCHLORIDE 100 MG/ML
1 SOLUTION/ DROPS OPHTHALMIC
Status: CANCELLED | OUTPATIENT
Start: 2023-03-22

## 2023-03-22 RX ORDER — MUPIROCIN 20 MG/G
OINTMENT TOPICAL
COMMUNITY
Start: 2023-02-15 | End: 2024-02-02 | Stop reason: ALTCHOICE

## 2023-03-22 RX ORDER — UBIDECARENONE 30 MG
30 CAPSULE ORAL
COMMUNITY
End: 2023-05-16 | Stop reason: SDUPTHER

## 2023-03-22 RX ORDER — MULTIVIT WITH IRON,MINERALS
1 TABLET ORAL
COMMUNITY
End: 2023-05-03

## 2023-03-22 RX ORDER — CALCIUM CARBONATE 600 MG
1 TABLET ORAL ONCE
COMMUNITY
End: 2023-05-03 | Stop reason: SDUPTHER

## 2023-03-22 RX ORDER — PREDNISOLONE ACETATE-GATIFLOXACIN-BROMFENAC .75; 5; 1 MG/ML; MG/ML; MG/ML
1 SUSPENSION/ DROPS OPHTHALMIC 3 TIMES DAILY
Qty: 5 ML | Refills: 3 | Status: SHIPPED | OUTPATIENT
Start: 2023-03-22 | End: 2023-05-15

## 2023-03-22 RX ORDER — PHENYLEPHRINE HYDROCHLORIDE 25 MG/ML
1 SOLUTION/ DROPS OPHTHALMIC
Status: CANCELLED | OUTPATIENT
Start: 2023-03-22

## 2023-03-22 RX ORDER — TETRACAINE HYDROCHLORIDE 5 MG/ML
1 SOLUTION OPHTHALMIC
Status: CANCELLED | OUTPATIENT
Start: 2023-03-22

## 2023-03-22 NOTE — PROGRESS NOTES
HPI    DLS: 9/7/22 Dr. Smith    Patient presents today for a Cataract Evaluation. Patient notes vision as   blurry. Patient notes difficulty driving at night and has issues with   glare. Patient notes no eye pain.    Last edited by Alec Alvarado on 3/22/2023  1:31 PM.            Assessment /Plan     For exam results, see Encounter Report.    Nuclear sclerotic cataract, bilateral    Nuclear sclerosis, bilateral      Visually Significant Cataract: Patient reports decreased vision consistent with the clinical amount of lenticular opacity, which reaches the level of visual significance and affects activities of daily living.     Specifically, this patient describes difficulty with:  - driving safely at night  - reading road signs  - reading small print  - deciphering medicine bottles  - reading the newspaper  - using the phone  - reading texts     Risks, benefits, and alternatives to cataract surgery were discussed and the consent reviewed. IOL options were discussed, including ATIOLs and the associated side effects and additional patient cost associated with them.   IOL Selections:   Right eye  IOL: [id number] 30.5      Left eye  IOL: [id number]  28.5 AT 30    Pt wishes to have LEFT eye done first.  The patient expresses a desire to reduce spectacle dependence. I reviewed various IOL and LASER refractive surgical options and we will attempt to minimize spectacle dependence by managing astigmatism and optimizing IOL selection. Femtosecond LASER assisted cataract surgery (FLACS) technology was explained to the patient with educational videos and discussion.  The patient voices understanding and wishes to implement this technology during the cataract procedure.  I explained the increased precision of the LASER versus manual techniques, especially as it relates to astigmatism reduction with arcuate incisions.  I emphasized that although our goal is to reduce the need for refractive correction after surgery, there may still  be a need for spectacle correction to achieve optimal visual acuity, and that a reasonable range of functional vision should be the expectation.  No guarantees are made about post operative refraction or visual acuity, as the eye may heal in unpredictable ways, and the standard risks, benefits, and alternatives to cataract surgery were explained.  The patient understands that the refractive portions of this cataract procedure are not covered by insurance, and that there is an out of pocket expense of $1500 per eye. I also explained that even though our pre-operative plan is to utilize advanced refractive technologies during surgery, that I may decide to eliminate part or all of this plan if surgical challenges or complications arise, or I feel that it is not in the patient's best interest. Consent forms and an ABN form were given to the patient to review.    ORA ONLY

## 2023-04-05 ENCOUNTER — LAB VISIT (OUTPATIENT)
Dept: LAB | Facility: HOSPITAL | Age: 83
End: 2023-04-05
Attending: NURSE PRACTITIONER
Payer: MEDICARE

## 2023-04-05 DIAGNOSIS — J06.9 UPPER RESPIRATORY TRACT INFECTION, UNSPECIFIED TYPE: ICD-10-CM

## 2023-04-05 LAB

## 2023-04-05 PROCEDURE — 87633 RESP VIRUS 12-25 TARGETS: CPT | Performed by: NURSE PRACTITIONER

## 2023-04-05 PROCEDURE — 87798 DETECT AGENT NOS DNA AMP: CPT | Mod: 59 | Performed by: NURSE PRACTITIONER

## 2023-04-06 RX ORDER — AMOXICILLIN AND CLAVULANATE POTASSIUM 875; 125 MG/1; MG/1
1 TABLET, FILM COATED ORAL 2 TIMES DAILY
Qty: 20 TABLET | Refills: 0 | Status: SHIPPED | OUTPATIENT
Start: 2023-04-06 | End: 2023-04-16

## 2023-04-06 NOTE — PROGRESS NOTES
Please let MS Maki know her Resp panel is negative. I got her message yesterday after her visit that she was running fever. I pended some antibiotics for her but wanted to know which pharmacy she would like them sent

## 2023-04-13 ENCOUNTER — TELEPHONE (OUTPATIENT)
Dept: OPHTHALMOLOGY | Facility: CLINIC | Age: 83
End: 2023-04-13
Payer: MEDICARE

## 2023-04-13 DIAGNOSIS — H25.12 NUCLEAR SCLEROTIC CATARACT OF LEFT EYE: Primary | ICD-10-CM

## 2023-04-25 ENCOUNTER — OFFICE VISIT (OUTPATIENT)
Dept: OBSTETRICS AND GYNECOLOGY | Facility: CLINIC | Age: 83
End: 2023-04-25
Payer: MEDICARE

## 2023-04-25 VITALS
DIASTOLIC BLOOD PRESSURE: 64 MMHG | SYSTOLIC BLOOD PRESSURE: 136 MMHG | WEIGHT: 151 LBS | BODY MASS INDEX: 30.5 KG/M2 | HEART RATE: 84 BPM

## 2023-04-25 DIAGNOSIS — B35.6 TINEA CRURIS: ICD-10-CM

## 2023-04-25 DIAGNOSIS — N39.3 STRESS INCONTINENCE: ICD-10-CM

## 2023-04-25 DIAGNOSIS — N81.11 CYSTOCELE, MIDLINE: Primary | ICD-10-CM

## 2023-04-25 DIAGNOSIS — N81.10 PROLAPSE OF ANTERIOR VAGINAL WALL: ICD-10-CM

## 2023-04-25 DIAGNOSIS — Z46.89 PESSARY MAINTENANCE: ICD-10-CM

## 2023-04-25 PROCEDURE — 3075F SYST BP GE 130 - 139MM HG: CPT | Mod: CPTII,S$GLB,, | Performed by: OBSTETRICS & GYNECOLOGY

## 2023-04-25 PROCEDURE — 99213 OFFICE O/P EST LOW 20 MIN: CPT | Mod: S$GLB,,, | Performed by: OBSTETRICS & GYNECOLOGY

## 2023-04-25 PROCEDURE — 1160F RVW MEDS BY RX/DR IN RCRD: CPT | Mod: CPTII,S$GLB,, | Performed by: OBSTETRICS & GYNECOLOGY

## 2023-04-25 PROCEDURE — 3078F DIAST BP <80 MM HG: CPT | Mod: CPTII,S$GLB,, | Performed by: OBSTETRICS & GYNECOLOGY

## 2023-04-25 PROCEDURE — 3075F PR MOST RECENT SYSTOLIC BLOOD PRESS GE 130-139MM HG: ICD-10-PCS | Mod: CPTII,S$GLB,, | Performed by: OBSTETRICS & GYNECOLOGY

## 2023-04-25 PROCEDURE — 99213 PR OFFICE/OUTPT VISIT, EST, LEVL III, 20-29 MIN: ICD-10-PCS | Mod: S$GLB,,, | Performed by: OBSTETRICS & GYNECOLOGY

## 2023-04-25 PROCEDURE — 1159F MED LIST DOCD IN RCRD: CPT | Mod: CPTII,S$GLB,, | Performed by: OBSTETRICS & GYNECOLOGY

## 2023-04-25 PROCEDURE — 3078F PR MOST RECENT DIASTOLIC BLOOD PRESSURE < 80 MM HG: ICD-10-PCS | Mod: CPTII,S$GLB,, | Performed by: OBSTETRICS & GYNECOLOGY

## 2023-04-25 PROCEDURE — 99999 PR PBB SHADOW E&M-EST. PATIENT-LVL II: CPT | Mod: PBBFAC,,, | Performed by: OBSTETRICS & GYNECOLOGY

## 2023-04-25 PROCEDURE — 1160F PR REVIEW ALL MEDS BY PRESCRIBER/CLIN PHARMACIST DOCUMENTED: ICD-10-PCS | Mod: CPTII,S$GLB,, | Performed by: OBSTETRICS & GYNECOLOGY

## 2023-04-25 PROCEDURE — 99999 PR PBB SHADOW E&M-EST. PATIENT-LVL II: ICD-10-PCS | Mod: PBBFAC,,, | Performed by: OBSTETRICS & GYNECOLOGY

## 2023-04-25 PROCEDURE — 1159F PR MEDICATION LIST DOCUMENTED IN MEDICAL RECORD: ICD-10-PCS | Mod: CPTII,S$GLB,, | Performed by: OBSTETRICS & GYNECOLOGY

## 2023-04-25 RX ORDER — NYSTATIN 100000 [USP'U]/G
POWDER TOPICAL
Qty: 60 G | Refills: 1 | Status: ON HOLD | OUTPATIENT
Start: 2023-04-25 | End: 2023-05-18

## 2023-04-25 NOTE — PROGRESS NOTES
Subjective:    Patient ID: Shanthi Can is a 82 y.o. y.o. female.     Chief Complaint:   Chief Complaint   Patient presents with    Pessary Check     Notices me light pink blood in her pants sometimes       History of Present Illness:   Shanthi presents for a pessary check. She has been doing well wearing the pessary.  She has had some light pink blood in her pants on occasion.  She has recently developed some increased respiratory issues and is coughing more.  She is currently wearing a depends undergarment and has some rash associated with it.  She is been putting triple antibiotic ointment on the rash with no relief.    MEDICATION LIST    Current Outpatient Medications:     albuterol-ipratropium (DUO-NEB) 2.5 mg-0.5 mg/3 mL nebulizer solution, Take 3 mLs by nebulization every 4 (four) hours as needed for Wheezing or Shortness of Breath. Rescue, Disp: 75 mL, Rfl: 0    albuterol-ipratropium (DUO-NEB) 2.5 mg-0.5 mg/3 mL nebulizer solution, Take 3 mLs by nebulization every 6 (six) hours as needed for Wheezing. Rescue, Disp: 75 mL, Rfl: 0    ascorbic acid, vitamin C, (VITAMIN C) 500 MG tablet, Take 500 mg by mouth once daily., Disp: , Rfl:     aspirin (ECOTRIN) 81 MG EC tablet, Take 81 mg by mouth once daily. STOPPED 04/19/2022, Disp: , Rfl:     azelastine 0.15 % (205.5 mcg) Mark, , Disp: , Rfl:     budesonide (PULMICORT) 0.5 mg/2 mL nebulizer solution, Take 2 mLs (0.5 mg total) by nebulization 2 (two) times a day. Controller, Disp: 120 mL, Rfl: 0    calcium carbonate (OS-SARA) 600 mg calcium (1,500 mg) Tab, Take 600 mg by mouth once., Disp: , Rfl:     calcium carbonate (OS-SARA) 600 mg calcium (1,500 mg) Tab, Take 1 tablet by mouth once., Disp: , Rfl:     co-enzyme Q-10 30 mg capsule, Take 30 mg by mouth., Disp: , Rfl:     coQ10, ubiquinol, 100 mg Cap, Take 200 mg by mouth every evening., Disp: 180 capsule, Rfl: 2    diltiaZEM (CARDIZEM CD) 240 MG 24 hr capsule, Take 1 capsule (240 mg total) by mouth once  daily., Disp: 30 capsule, Rfl: 11    fluticasone propionate (XHANCE NASL), 1 spray by Nasal route 2 (two) times a day. Prescribed by ENT, Disp: , Rfl:     gabapentin (NEURONTIN) 100 MG capsule, Take 1 capsule (100 mg total) by mouth 2 (two) times daily., Disp: 180 capsule, Rfl: 1    glucosamine-chondroitin 500-400 mg tablet, Take 1 tablet by mouth 3 (three) times daily., Disp: , Rfl:     guaiFENesin (MUCINEX) 600 mg 12 hr tablet, Take 1,200 mg by mouth 2 (two) times daily., Disp: , Rfl:     levothyroxine (SYNTHROID) 100 MCG tablet, TAKE 1 TABLET EVERY DAY, Disp: 90 tablet, Rfl: 1    magnesium 250 mg Tab, Take 400 mg by mouth every morning. 250mg QAM and 500mg QHS, Disp: , Rfl:     multivitamin capsule, Take 1 capsule by mouth once daily., Disp: , Rfl:     mupirocin (BACTROBAN) 2 % ointment, , Disp: , Rfl:     nebulizer and compressor Irene, 1 kit by Misc.(Non-Drug; Combo Route) route every 6 (six) hours as needed (wheezing SOB persistent cough)., Disp: 1 each, Rfl: 0    nystatin (MYCOSTATIN) powder, Apply to affected area 3 times daily, Disp: 60 g, Rfl: 1    omega 3-dha-epa-fish oil (FISH OIL) 100-160-1,000 mg Cap, Take by mouth., Disp: , Rfl:     oxyquinoline-sod.lauryl sulfat (TRIMO-DUGAN JELLY) 0.025-0.01 % Gel, Place 1 Applicatorful vaginally twice a week., Disp: 113.4 g, Rfl: 2    pantoprazole (PROTONIX) 40 MG tablet, TAKE 1 TABLET EVERY DAY, Disp: 90 tablet, Rfl: 1    potassium 99 mg Tab, Take by mouth once., Disp: , Rfl:     potassium gluconate 2.5 mEq Tab, Take 1 tablet by mouth., Disp: , Rfl:     prednisolon/gatiflox/bromfenac (PREDNISOL ACE-GATIFLOX-BROMFEN) 1-0.5-0.075 % DrpS, Apply 1 drop to eye 3 (three) times daily. in operative eye for 1 month after surgery, Disp: 5 mL, Rfl: 3    prednisolon/gatiflox/bromfenac (PREDNISOL ACE-GATIFLOX-BROMFEN) 1-0.5-0.075 % DrpS, Apply 1 drop to eye 3 (three) times daily. in operative eye for 1 month after surgery, Disp: 5 mL, Rfl: 3    rosuvastatin (CRESTOR) 40 MG Tab,  TAKE 1 TABLET EVERY DAY, Disp: 90 tablet, Rfl: 1    PRE-PESSARY CHECK COUNSELING:  The patient was informed of the risks of pain or discomfort, continuous incontinence, urinary retention with insertion of a pessary and malodorous discharge and/or possible bleeding from granulation tissue after wearing the pessary for sometime. She was counseled on the alternatives to pessary insertion, including surgery or no treatment with continued symptoms, and she agrees to proceed.    PROCEDURE:  TIME OUT PERFORMED.  The pessary was removed and cleaned with soap and water.  There was no granulation tissue present. There is a mild area of erythema along the vaginal wall at the site of the pessary but no ulceration or granulation was noted.  There was a slight discharge present.  The pessary was re-inserted.  The patient was able to sit, stand, walk and either cough or urinate on the toilet after the procedure normally without expelling the pessary. The patient tolerated the procedure well.    Also noted there is erythema consistent with tinea in the crural folds    Discussed use of nystatin powder instead of antibiotic ointment to help alleviate the redness and irritation.  Patient also enquiring about other options for treatment of symptoms.  Discussed surgical intervention which would require cystocele repair, colpo suspension, and sling.  Patient currently not ready to undergo such extensive surgery.    I spent a total of 20 minutes on the day of the visit.  This includes face to face time and non-face to face time preparing to see the patient (eg, review of tests), obtaining and/or reviewing separately obtained history, documenting clinical information in the electronic or other health record, independently interpreting results and communicating results to the patient/family/caregiver, or care coordinator.       ASSESSMENT:      1. Cystocele, midline    2. Prolapse of anterior vaginal wall    3. Stress incontinence    4.  Pessary maintenance    5. Tinea cruris        POST PESSARY CHECK COUNSELING:  Report worsening urinary incontinency, urinary retention, pain, fever, foul smelling discharge or bleeding.  Importance of keeping follow-up appointments for a pessary check stressed.    Counseling lasted approximately 10 minutes and all her questions were answered.    FOLLOW-UP: In 3 months for pessary check.      Karen Mota MD FAC    04/25/2023  10:45 AM

## 2023-05-01 ENCOUNTER — TELEPHONE (OUTPATIENT)
Dept: OPHTHALMOLOGY | Facility: CLINIC | Age: 83
End: 2023-05-01
Payer: MEDICARE

## 2023-05-01 NOTE — TELEPHONE ENCOUNTER
Patient given arrival time of 8:30 am on Thursday May 4. Nothing to eat or drink after 9 pm.  Water, Gatorade after 9 pm until leaves home.  Start drops into the operative eye today. [address] [facility]

## 2023-05-02 ENCOUNTER — TELEPHONE (OUTPATIENT)
Dept: OPHTHALMOLOGY | Facility: CLINIC | Age: 83
End: 2023-05-02
Payer: MEDICARE

## 2023-05-02 DIAGNOSIS — H25.11 NUCLEAR SCLEROTIC CATARACT OF RIGHT EYE: Primary | ICD-10-CM

## 2023-05-03 ENCOUNTER — ANESTHESIA EVENT (OUTPATIENT)
Dept: SURGERY | Facility: HOSPITAL | Age: 83
End: 2023-05-03
Payer: MEDICARE

## 2023-05-03 NOTE — PRE-PROCEDURE INSTRUCTIONS
- NPO after midinight, except AM meds with small sip of water  - No Diabetic meds OR Fluid pills  - All B/P meds are ok to take, except those that contain a fluid pill  - Hold all vits and herbal meds AM of surgery  - Use inhalers as needed and bring AM of surgery  - Wash face for 3 mins PM prior and AM of surgery  - No powder, lotions, creams, (makeup),  or jewelry    - Wear comfortable clothing (button up shirt)    -- 2nd floor surgery ct at [facility] @ [address], [place] [address]    Pt voiced understanding

## 2023-05-03 NOTE — ANESTHESIA PREPROCEDURE EVALUATION
05/03/2023  Shanthi Can is a 82 y.o., female.      Pre-op Assessment    I have reviewed the Patient Summary Reports.    I have reviewed the NPO Status.   I have reviewed the Medications.     Review of Systems  Anesthesia Hx:  Denies Family Hx of Anesthesia complications.   Denies Personal Hx of Anesthesia complications.   EENT/Dental:   Eyes:   Cardiovascular:   Hypertension Dysrhythmias PVD hyperlipidemia NYHA Classification II PSVT  First degree AV block  Carotid disease   Pulmonary:   COPD: ruled out as per pt. Asthma Shortness of breath    Hepatic/GI:   GERD    Musculoskeletal:   Arthritis     Endocrine:   Diabetes Hypothyroidism      GERD (gastroesophageal reflux disease) Thyroid disease   High cholesterol Hx of pancreatitis   Hypertension IFG (impaired fasting glucose)   Dyspnea Occlusion and stenosis of unspecified carotid artery   COPD (chronic obstructive pulmonary disease) Arthritis   Change in bowel habit SOB (shortness of breath)   COPD exacerbation Type II diabetes mellitus   Viral upper respiratory tract infection Cataract     Surgical History    CHOLECYSTECTOMY TONSILLECTOMY   DILATION AND CURETTAGE OF UTERUS TENDON RELEASE   COLONOSCOPY          Physical Exam  General: Well nourished, Cooperative, Alert and Oriented    Airway:  Mallampati: II   Mouth Opening: Normal  TM Distance: Normal  Tongue: Normal  Neck ROM: Normal ROM        Anesthesia Plan  Type of Anesthesia, risks & benefits discussed:    Anesthesia Type: MAC  Intra-op Monitoring Plan: Standard ASA Monitors  Post Op Pain Control Plan: multimodal analgesia and IV/PO Opioids PRN  Induction:  IV  Informed Consent: Informed consent signed with the Patient and all parties understand the risks and agree with anesthesia plan.  All questions answered.   ASA Score: 3  Day of Surgery Review of History & Physical: H&P Update referred to  the surgeon/provider.I have interviewed and examined the patient. I have reviewed the patient's H&P dated: There are no significant changes. H&P completed by Anesthesiologist.    Ready For Surgery From Anesthesia Perspective.     .

## 2023-05-04 ENCOUNTER — HOSPITAL ENCOUNTER (OUTPATIENT)
Facility: HOSPITAL | Age: 83
Discharge: HOME | End: 2023-05-04
Attending: OPHTHALMOLOGY | Admitting: OPHTHALMOLOGY
Payer: MEDICARE

## 2023-05-04 ENCOUNTER — ANESTHESIA (OUTPATIENT)
Dept: SURGERY | Facility: HOSPITAL | Age: 83
End: 2023-05-04
Payer: MEDICARE

## 2023-05-04 VITALS
HEIGHT: 59 IN | TEMPERATURE: 98 F | OXYGEN SATURATION: 96 % | RESPIRATION RATE: 18 BRPM | BODY MASS INDEX: 29.64 KG/M2 | WEIGHT: 147 LBS | SYSTOLIC BLOOD PRESSURE: 150 MMHG | DIASTOLIC BLOOD PRESSURE: 65 MMHG | HEART RATE: 87 BPM

## 2023-05-04 DIAGNOSIS — H25.12 NUCLEAR SCLEROTIC CATARACT OF LEFT EYE: Primary | ICD-10-CM

## 2023-05-04 DIAGNOSIS — H25.13 NUCLEAR SCLEROTIC CATARACT, BILATERAL: ICD-10-CM

## 2023-05-04 PROCEDURE — 25000003 PHARM REV CODE 250: Performed by: OPHTHALMOLOGY

## 2023-05-04 PROCEDURE — 66984 PR REMOVAL, CATARACT, W/INSRT INTRAOC LENS, W/O ENDO CYCLO: ICD-10-PCS | Mod: LT,,, | Performed by: OPHTHALMOLOGY

## 2023-05-04 PROCEDURE — 71000015 HC POSTOP RECOV 1ST HR: Performed by: OPHTHALMOLOGY

## 2023-05-04 PROCEDURE — 63600175 PHARM REV CODE 636 W HCPCS: Performed by: NURSE ANESTHETIST, CERTIFIED REGISTERED

## 2023-05-04 PROCEDURE — 36000707: Performed by: OPHTHALMOLOGY

## 2023-05-04 PROCEDURE — V2787 ASTIGMATISM-CORRECT FUNCTION: HCPCS | Mod: WS | Performed by: OPHTHALMOLOGY

## 2023-05-04 PROCEDURE — 37000008 HC ANESTHESIA 1ST 15 MINUTES: Performed by: OPHTHALMOLOGY

## 2023-05-04 PROCEDURE — D9220A PRA ANESTHESIA: Mod: ,,, | Performed by: NURSE ANESTHETIST, CERTIFIED REGISTERED

## 2023-05-04 PROCEDURE — 99900035 HC TECH TIME PER 15 MIN (STAT)

## 2023-05-04 PROCEDURE — 37000009 HC ANESTHESIA EA ADD 15 MINS: Performed by: OPHTHALMOLOGY

## 2023-05-04 PROCEDURE — 66999 UNLISTED PX ANT SEGMENT EYE: CPT | Mod: CSM,LT,, | Performed by: OPHTHALMOLOGY

## 2023-05-04 PROCEDURE — 66999 PR FEMTO TORIC: ICD-10-PCS | Mod: CSM,LT,, | Performed by: OPHTHALMOLOGY

## 2023-05-04 PROCEDURE — 36000706: Performed by: OPHTHALMOLOGY

## 2023-05-04 PROCEDURE — D9220A PRA ANESTHESIA: ICD-10-PCS | Mod: ,,, | Performed by: NURSE ANESTHETIST, CERTIFIED REGISTERED

## 2023-05-04 PROCEDURE — 66984 XCAPSL CTRC RMVL W/O ECP: CPT | Mod: LT,,, | Performed by: OPHTHALMOLOGY

## 2023-05-04 PROCEDURE — 94761 N-INVAS EAR/PLS OXIMETRY MLT: CPT

## 2023-05-04 DEVICE — IMPLANTABLE DEVICE: Type: IMPLANTABLE DEVICE | Site: EYE | Status: FUNCTIONAL

## 2023-05-04 RX ORDER — ACETAMINOPHEN 325 MG/1
650 TABLET ORAL EVERY 4 HOURS PRN
Status: DISCONTINUED | OUTPATIENT
Start: 2023-05-04 | End: 2023-05-04 | Stop reason: HOSPADM

## 2023-05-04 RX ORDER — TROPICAMIDE 10 MG/ML
1 SOLUTION/ DROPS OPHTHALMIC
Status: COMPLETED | OUTPATIENT
Start: 2023-05-04 | End: 2023-05-04

## 2023-05-04 RX ORDER — MOXIFLOXACIN 5 MG/ML
1 SOLUTION/ DROPS OPHTHALMIC
Status: COMPLETED | OUTPATIENT
Start: 2023-05-04 | End: 2023-05-04

## 2023-05-04 RX ORDER — MOXIFLOXACIN 5 MG/ML
SOLUTION/ DROPS OPHTHALMIC
Status: DISCONTINUED | OUTPATIENT
Start: 2023-05-04 | End: 2023-05-04 | Stop reason: HOSPADM

## 2023-05-04 RX ORDER — PHENYLEPHRINE HYDROCHLORIDE 25 MG/ML
1 SOLUTION/ DROPS OPHTHALMIC
Status: COMPLETED | OUTPATIENT
Start: 2023-05-04 | End: 2023-05-04

## 2023-05-04 RX ORDER — TETRACAINE HYDROCHLORIDE 5 MG/ML
SOLUTION OPHTHALMIC
Status: DISCONTINUED | OUTPATIENT
Start: 2023-05-04 | End: 2023-05-04 | Stop reason: HOSPADM

## 2023-05-04 RX ORDER — PHENYLEPHRINE HYDROCHLORIDE 100 MG/ML
1 SOLUTION/ DROPS OPHTHALMIC
Status: DISCONTINUED | OUTPATIENT
Start: 2023-05-04 | End: 2023-05-04 | Stop reason: HOSPADM

## 2023-05-04 RX ORDER — TETRACAINE HYDROCHLORIDE 5 MG/ML
1 SOLUTION OPHTHALMIC
Status: COMPLETED | OUTPATIENT
Start: 2023-05-04 | End: 2023-05-04

## 2023-05-04 RX ORDER — LIDOCAINE HYDROCHLORIDE 40 MG/ML
INJECTION, SOLUTION RETROBULBAR
Status: DISCONTINUED | OUTPATIENT
Start: 2023-05-04 | End: 2023-05-04 | Stop reason: HOSPADM

## 2023-05-04 RX ORDER — PROPARACAINE HYDROCHLORIDE 5 MG/ML
1 SOLUTION/ DROPS OPHTHALMIC
Status: DISCONTINUED | OUTPATIENT
Start: 2023-05-04 | End: 2023-05-04 | Stop reason: HOSPADM

## 2023-05-04 RX ORDER — FENTANYL CITRATE 50 UG/ML
INJECTION, SOLUTION INTRAMUSCULAR; INTRAVENOUS
Status: DISCONTINUED | OUTPATIENT
Start: 2023-05-04 | End: 2023-05-04

## 2023-05-04 RX ADMIN — MOXIFLOXACIN 1 DROP: 5 SOLUTION/ DROPS OPHTHALMIC at 10:05

## 2023-05-04 RX ADMIN — TETRACAINE HYDROCHLORIDE 1 DROP: 5 SOLUTION OPHTHALMIC at 08:05

## 2023-05-04 RX ADMIN — MOXIFLOXACIN OPHTHALMIC 1 DROP: 5 SOLUTION/ DROPS OPHTHALMIC at 08:05

## 2023-05-04 RX ADMIN — FENTANYL CITRATE 50 MCG: 50 INJECTION, SOLUTION INTRAMUSCULAR; INTRAVENOUS at 10:05

## 2023-05-04 RX ADMIN — TROPICAMIDE 1 DROP: 10 SOLUTION/ DROPS OPHTHALMIC at 08:05

## 2023-05-04 RX ADMIN — PHENYLEPHRINE HYDROCHLORIDE 1 DROP: 25 SOLUTION/ DROPS OPHTHALMIC at 08:05

## 2023-05-04 RX ADMIN — FENTANYL CITRATE 25 MCG: 50 INJECTION, SOLUTION INTRAMUSCULAR; INTRAVENOUS at 10:05

## 2023-05-04 NOTE — OP NOTE
SURGEON:  Mary Smith M.D.    PREOPERATIVE DIAGNOSIS:    Nuclear Sclerotic Cataract Left Eye    POSTOPERATIVE DIAGNOSIS:    Nuclear Sclerotic Cataract Left  Eye    PROCEDURES:    Phacoemulsification with  intraocular lens, Left eye (52336)  With ORA LASER assist    DATE OF SURGERY: 05/04/2023    IMPLANT: [id number] 28.5 AT 35    ANESTHESIA:  MAC with topical Lidocaine    COMPLICATIONS:  None    ESTIMATED BLOOD LOSS: None    SPECIMENS: None    INDICATIONS:    The patient has a history of painless progressive visual loss and difficulty with activities of daily living, which specifically include difficult driving at night due to glare and difficulty reading small print, secondary to cataract formation.  After a thorough discussion of the risks, benefits, and alternatives to cataract surgery, including, but not limited to, the rare risks of infection, retinal detachment, hemorrhage, need for additional surgery, loss of vision, and even loss of the eye, the patient voices understanding and desires to proceed.    DESCRIPTION OF PROCEDURE:      The patients IOL calculations were reviewed, and the lens selection confirmed.   After verification and marking of the proper eye in the preop holding area, the patient was brought to the operating room in supine position where the eye was prepped and draped in standard sterile fashion with 5% Betadine and a lid speculum placed in the eye.   Topical 4% Lidocaine was used in addition to the preoperative anesthesia and the procedure was begun by the creation of a paracentesis incision through which viscoelastic was used to fill the anterior chamber.  Next, a keratome blade was used to create a triplanar temporal clear corneal incision and a cystotome and Utrata forceps used to fashion a continuous curvilinear capsulorrhexis.  Hydrodissection was carried out using the Raya hydrodissection cannula and the nucleus was found to be mobile.  Phacoemulsification of the nucleus was carried out  using a quick chop technique, and all remaining epinuclear and cortical material was removed.  The eye was then reformed with Viscoelastic and ORA was used to verify the proper IOL power. The intraocular lens was then implanted into the capsular bag.  All remaining viscoelastics were removed from the eye and at the end of the case the pupil was round, the lens was well-centered within the capsular bag and all wounds were found to be water tight.  Drops of Vigamox and Pred Forte were instilled and a shield was placed over the eye. The patient will follow up with Dr. Smith in the morning.

## 2023-05-04 NOTE — DISCHARGE SUMMARY
Outcome: Successful outpatient ophthalmic surgical procedure  Preprinted Instructions given to patient.  Regular diet.  Activity: No restrictions  Meds: see Med Rec  Condition: stable  Follow up: 1 day with Dr Smith  Disposition: Home  Diagnosis: s/p eye surgery  Date of discharge: 05/04/2023

## 2023-05-04 NOTE — ANESTHESIA POSTPROCEDURE EVALUATION
Anesthesia Post Evaluation    Patient: Shanthi Can    Procedure(s) Performed: Procedure(s) (LRB):  EXTRACTION, CATARACT, WITH IOL INSERTION (Left)    Final Anesthesia Type: MAC      Patient location during evaluation: PACU  Patient participation: Yes- Able to Participate  Level of consciousness: awake and alert  Post-procedure vital signs: reviewed and stable  Pain management: adequate  Airway patency: patent    PONV status at discharge: No PONV  Anesthetic complications: no      Cardiovascular status: blood pressure returned to baseline  Respiratory status: unassisted, spontaneous ventilation and room air  Hydration status: euvolemic  Follow-up not needed.          Vitals Value Taken Time   /71 05/04/23 1057   Temp 36.9 °C (98.4 °F) 05/04/23 1042   Pulse 75 05/04/23 1057   Resp 16 05/04/23 1057   SpO2 97 % 05/04/23 1057         No case tracking events are documented in the log.      Pain/Deya Score: Deya Score: 10 (5/4/2023 10:57 AM)

## 2023-05-04 NOTE — TRANSFER OF CARE
"Anesthesia Transfer of Care Note    Patient: Shanthi Can    Procedure(s) Performed: Procedure(s) (LRB):  EXTRACTION, CATARACT, WITH IOL INSERTION (Left)    Patient location: PACU    Anesthesia Type: MAC    Transport from OR: Transported from OR on room air with adequate spontaneous ventilation    Post pain: adequate analgesia    Post assessment: no apparent anesthetic complications and tolerated procedure well    Post vital signs: stable    Level of consciousness: awake, alert and oriented    Nausea/Vomiting: no nausea/vomiting    Complications: none    Transfer of care protocol was followed      Last vitals:   Visit Vitals  /62 (BP Location: Left arm, Patient Position: Lying)   Pulse 80   Temp 36.9 °C (98.4 °F) (Oral)   Resp 18   Ht 4' 11" (1.499 m)   Wt 66.7 kg (147 lb)   SpO2 97%   Breastfeeding No   BMI 29.69 kg/m²     "

## 2023-05-04 NOTE — DISCHARGE INSTRUCTIONS
CATARACT SURGERY    POST-OPERATIVE INSTRUCTIONS    · Apply drops THREE times a day into operative eye for 30 days.    · DO NOT rub your eye    · Wear protective sunglasses during the day    · Resume moderate activity    · Bathe/shower/wash face normally    · DO NOT apply makeup around the operative eye for 1 week.         You should expect    - Blurry vision and halos for 24-48 hours    - Dilated pupil for 24-48 hours    - Scratchy feeling in the eye for 1-2 days    - Curved shadow in your peripheral vision for 2-3 weeks    - Occasional flickering of lights for up to 1 week    -If you experience severe pain or nausea, please call Dr Smith or the on-call doctor at [phone]    - Plan to see Dr Smith tomorrow .      [facility]    [address] [address]    ** Most patients can drive the next day, but if you do not feel comfortable driving, please arrange for transportation.

## 2023-05-05 ENCOUNTER — OFFICE VISIT (OUTPATIENT)
Dept: OPHTHALMOLOGY | Facility: CLINIC | Age: 83
End: 2023-05-05
Payer: MEDICARE

## 2023-05-05 DIAGNOSIS — Z98.890 POST-OPERATIVE STATE: Primary | ICD-10-CM

## 2023-05-05 DIAGNOSIS — H25.12 NUCLEAR SCLEROSIS OF LEFT EYE: ICD-10-CM

## 2023-05-05 PROCEDURE — 1101F PT FALLS ASSESS-DOCD LE1/YR: CPT | Mod: CPTII,S$GLB,, | Performed by: OPHTHALMOLOGY

## 2023-05-05 PROCEDURE — 1126F AMNT PAIN NOTED NONE PRSNT: CPT | Mod: CPTII,S$GLB,, | Performed by: OPHTHALMOLOGY

## 2023-05-05 PROCEDURE — 99024 POSTOP FOLLOW-UP VISIT: CPT | Mod: S$GLB,,, | Performed by: OPHTHALMOLOGY

## 2023-05-05 PROCEDURE — 99024 PR POST-OP FOLLOW-UP VISIT: ICD-10-PCS | Mod: S$GLB,,, | Performed by: OPHTHALMOLOGY

## 2023-05-05 PROCEDURE — 1160F RVW MEDS BY RX/DR IN RCRD: CPT | Mod: CPTII,S$GLB,, | Performed by: OPHTHALMOLOGY

## 2023-05-05 PROCEDURE — 1101F PR PT FALLS ASSESS DOC 0-1 FALLS W/OUT INJ PAST YR: ICD-10-PCS | Mod: CPTII,S$GLB,, | Performed by: OPHTHALMOLOGY

## 2023-05-05 PROCEDURE — 1126F PR PAIN SEVERITY QUANTIFIED, NO PAIN PRESENT: ICD-10-PCS | Mod: CPTII,S$GLB,, | Performed by: OPHTHALMOLOGY

## 2023-05-05 PROCEDURE — 3288F FALL RISK ASSESSMENT DOCD: CPT | Mod: CPTII,S$GLB,, | Performed by: OPHTHALMOLOGY

## 2023-05-05 PROCEDURE — 99999 PR PBB SHADOW E&M-EST. PATIENT-LVL II: ICD-10-PCS | Mod: PBBFAC,,, | Performed by: OPHTHALMOLOGY

## 2023-05-05 PROCEDURE — 1159F PR MEDICATION LIST DOCUMENTED IN MEDICAL RECORD: ICD-10-PCS | Mod: CPTII,S$GLB,, | Performed by: OPHTHALMOLOGY

## 2023-05-05 PROCEDURE — 1160F PR REVIEW ALL MEDS BY PRESCRIBER/CLIN PHARMACIST DOCUMENTED: ICD-10-PCS | Mod: CPTII,S$GLB,, | Performed by: OPHTHALMOLOGY

## 2023-05-05 PROCEDURE — 99999 PR PBB SHADOW E&M-EST. PATIENT-LVL II: CPT | Mod: PBBFAC,,, | Performed by: OPHTHALMOLOGY

## 2023-05-05 PROCEDURE — 1159F MED LIST DOCD IN RCRD: CPT | Mod: CPTII,S$GLB,, | Performed by: OPHTHALMOLOGY

## 2023-05-05 PROCEDURE — 3288F PR FALLS RISK ASSESSMENT DOCUMENTED: ICD-10-PCS | Mod: CPTII,S$GLB,, | Performed by: OPHTHALMOLOGY

## 2023-05-05 RX ORDER — CALCIUM CARBONATE 600 MG
600 TABLET ORAL
COMMUNITY

## 2023-05-05 RX ORDER — CALCIUM CARB, CITRATE, MALATE 250 MG
1 CAPSULE ORAL
COMMUNITY

## 2023-05-08 ENCOUNTER — TELEPHONE (OUTPATIENT)
Dept: OPHTHALMOLOGY | Facility: CLINIC | Age: 83
End: 2023-05-08
Payer: MEDICARE

## 2023-05-08 NOTE — TELEPHONE ENCOUNTER
----- Message from Fallon Pires sent at 5/8/2023  9:10 AM CDT -----  Regarding: Medical Advice  Contact: Pt  Pt is requesting a callback in regards to surgery that was completed on last week. Pt would like to know how long can she go without bending or lifting. Please adv pt    Confirmed contact below:   Contact Name:Shanthi Can  Phone Number: [phone]

## 2023-05-12 ENCOUNTER — OFFICE VISIT (OUTPATIENT)
Dept: OPHTHALMOLOGY | Facility: CLINIC | Age: 83
End: 2023-05-12
Payer: MEDICARE

## 2023-05-12 DIAGNOSIS — H25.13 NUCLEAR SCLEROSIS, BILATERAL: Primary | ICD-10-CM

## 2023-05-12 DIAGNOSIS — Z98.890 POST-OPERATIVE STATE: ICD-10-CM

## 2023-05-12 PROCEDURE — 1101F PR PT FALLS ASSESS DOC 0-1 FALLS W/OUT INJ PAST YR: ICD-10-PCS | Mod: CPTII,S$GLB,, | Performed by: OPHTHALMOLOGY

## 2023-05-12 PROCEDURE — 1126F PR PAIN SEVERITY QUANTIFIED, NO PAIN PRESENT: ICD-10-PCS | Mod: CPTII,S$GLB,, | Performed by: OPHTHALMOLOGY

## 2023-05-12 PROCEDURE — 99024 PR POST-OP FOLLOW-UP VISIT: ICD-10-PCS | Mod: S$GLB,,, | Performed by: OPHTHALMOLOGY

## 2023-05-12 PROCEDURE — 99999 PR PBB SHADOW E&M-EST. PATIENT-LVL II: CPT | Mod: PBBFAC,,, | Performed by: OPHTHALMOLOGY

## 2023-05-12 PROCEDURE — 99999 PR PBB SHADOW E&M-EST. PATIENT-LVL II: ICD-10-PCS | Mod: PBBFAC,,, | Performed by: OPHTHALMOLOGY

## 2023-05-12 PROCEDURE — 1126F AMNT PAIN NOTED NONE PRSNT: CPT | Mod: CPTII,S$GLB,, | Performed by: OPHTHALMOLOGY

## 2023-05-12 PROCEDURE — 1160F PR REVIEW ALL MEDS BY PRESCRIBER/CLIN PHARMACIST DOCUMENTED: ICD-10-PCS | Mod: CPTII,S$GLB,, | Performed by: OPHTHALMOLOGY

## 2023-05-12 PROCEDURE — 99024 POSTOP FOLLOW-UP VISIT: CPT | Mod: S$GLB,,, | Performed by: OPHTHALMOLOGY

## 2023-05-12 PROCEDURE — 3288F PR FALLS RISK ASSESSMENT DOCUMENTED: ICD-10-PCS | Mod: CPTII,S$GLB,, | Performed by: OPHTHALMOLOGY

## 2023-05-12 PROCEDURE — 1160F RVW MEDS BY RX/DR IN RCRD: CPT | Mod: CPTII,S$GLB,, | Performed by: OPHTHALMOLOGY

## 2023-05-12 PROCEDURE — 1159F MED LIST DOCD IN RCRD: CPT | Mod: CPTII,S$GLB,, | Performed by: OPHTHALMOLOGY

## 2023-05-12 PROCEDURE — 1159F PR MEDICATION LIST DOCUMENTED IN MEDICAL RECORD: ICD-10-PCS | Mod: CPTII,S$GLB,, | Performed by: OPHTHALMOLOGY

## 2023-05-12 PROCEDURE — 3288F FALL RISK ASSESSMENT DOCD: CPT | Mod: CPTII,S$GLB,, | Performed by: OPHTHALMOLOGY

## 2023-05-12 PROCEDURE — 1101F PT FALLS ASSESS-DOCD LE1/YR: CPT | Mod: CPTII,S$GLB,, | Performed by: OPHTHALMOLOGY

## 2023-05-12 RX ORDER — MOXIFLOXACIN 5 MG/ML
1 SOLUTION/ DROPS OPHTHALMIC
Status: CANCELLED | OUTPATIENT
Start: 2023-05-12

## 2023-05-12 RX ORDER — CYCLOP/TROP/PROPA/PHEN/KET/WAT 1-1-0.1%
1 DROPS (EA) OPHTHALMIC (EYE)
Status: CANCELLED | OUTPATIENT
Start: 2023-05-12

## 2023-05-12 RX ORDER — PHENYLEPHRINE HYDROCHLORIDE 100 MG/ML
1 SOLUTION/ DROPS OPHTHALMIC
Status: CANCELLED | OUTPATIENT
Start: 2023-05-12

## 2023-05-12 NOTE — PROGRESS NOTES
HPI    Pt here for 1 week post op OS.  Pt states she gets a buildup on the   eyelids OS.  Pt denies eye pain OS.    PGB TID OS     05/04/2023 IMPLANT: [id number] 28.5 AT 35 OS    Last edited by Shelia Brown MA on 5/12/2023  1:33 PM.            Assessment /Plan     For exam results, see Encounter Report.    Nuclear sclerosis, bilateral    Post-operative state      Slit lamp exam:  L/L: nl  K: clear, wound sealed  AC: trace cell  Iris/Lens: IOL centered and stable    POW1 s/p phaco: Surgery healing well with no signs of infection or abnormal inflammation.    Patient wishes to proceed with surgery in the second eye. Risks, benefits, alternatives reviewed. IOL selection reviewed.     Right eye  IOL: [id number] 30.5       The patient expresses a desire to reduce spectacle dependence. I reviewed various IOL and LASER refractive surgical options and we will attempt to minimize spectacle dependence by managing astigmatism and optimizing IOL selection. Femtosecond LASER assisted cataract surgery (FLACS) technology was explained to the patient with educational videos and discussion.  The patient voices understanding and wishes to implement this technology during the cataract procedure.  I explained the increased precision of the LASER versus manual techniques, especially as it relates to astigmatism reduction with arcuate incisions.  I emphasized that although our goal is to reduce the need for refractive correction after surgery, there may still be a need for spectacle correction to achieve optimal visual acuity, and that a reasonable range of functional vision should be the expectation.  No guarantees are made about post operative refraction or visual acuity, as the eye may heal in unpredictable ways, and the standard risks, benefits, and alternatives to cataract surgery were explained.  The patient understands that the refractive portions of this cataract procedure are not covered by insurance, and that there is an  out of pocket expense of $1500 per eye. I also explained that even though our pre-operative plan is to utilize advanced refractive technologies during surgery, that I may decide to eliminate part or all of this plan if surgical challenges or complications arise, or I feel that it is not in the patient's best interest. Consent forms and an ABN form were given to the patient to review.    ORA ONLY

## 2023-05-12 NOTE — H&P (VIEW-ONLY)
HPI    Pt here for 1 week post op OS.  Pt states she gets a buildup on the   eyelids OS.  Pt denies eye pain OS.    PGB TID OS     05/04/2023 IMPLANT: [id number] 28.5 AT 35 OS    Last edited by Shelia Brown MA on 5/12/2023  1:33 PM.            Assessment /Plan     For exam results, see Encounter Report.    Nuclear sclerosis, bilateral    Post-operative state      Slit lamp exam:  L/L: nl  K: clear, wound sealed  AC: trace cell  Iris/Lens: IOL centered and stable    POW1 s/p phaco: Surgery healing well with no signs of infection or abnormal inflammation.    Patient wishes to proceed with surgery in the second eye. Risks, benefits, alternatives reviewed. IOL selection reviewed.     Right eye  IOL: [id number] 30.5       The patient expresses a desire to reduce spectacle dependence. I reviewed various IOL and LASER refractive surgical options and we will attempt to minimize spectacle dependence by managing astigmatism and optimizing IOL selection. Femtosecond LASER assisted cataract surgery (FLACS) technology was explained to the patient with educational videos and discussion.  The patient voices understanding and wishes to implement this technology during the cataract procedure.  I explained the increased precision of the LASER versus manual techniques, especially as it relates to astigmatism reduction with arcuate incisions.  I emphasized that although our goal is to reduce the need for refractive correction after surgery, there may still be a need for spectacle correction to achieve optimal visual acuity, and that a reasonable range of functional vision should be the expectation.  No guarantees are made about post operative refraction or visual acuity, as the eye may heal in unpredictable ways, and the standard risks, benefits, and alternatives to cataract surgery were explained.  The patient understands that the refractive portions of this cataract procedure are not covered by insurance, and that there is an  out of pocket expense of $1500 per eye. I also explained that even though our pre-operative plan is to utilize advanced refractive technologies during surgery, that I may decide to eliminate part or all of this plan if surgical challenges or complications arise, or I feel that it is not in the patient's best interest. Consent forms and an ABN form were given to the patient to review.    ORA ONLY

## 2023-05-15 ENCOUNTER — TELEPHONE (OUTPATIENT)
Dept: OPHTHALMOLOGY | Facility: CLINIC | Age: 83
End: 2023-05-15
Payer: MEDICARE

## 2023-05-15 NOTE — TELEPHONE ENCOUNTER
Patient given arrival time of 8:30 am on Thursday May 18. Nothing to eat or drink after 9 pm.  Water, Gatorade after 9 pm until leaves home.  Start drops into the operative eye today. [address] [facility]

## 2023-05-16 NOTE — PRE-PROCEDURE INSTRUCTIONS
- Nothing to eat or drink after midinight, except AM meds with small sips of water  - No Diabetic meds or Fluid pills  - All B/P meds are ok to take, except those that contain a fluid pill  - Hold all vits and herbal meds AM of surgery  - Use inhalers as needed and bring AM of surgery  - Use eye drops as directed  - Shower and wash face for 3 mins PM prior and AM of surgery  - No powder, lotions, creams, (makeup),  or jewelry    - Wear comfortable clothing (button up shirt)    (Patients ride may not leave while patient is in surgery)    -- 2nd floor surgery ctr at [facility] @ [address] [facility] [place] [address]       Pt voiced understanding

## 2023-05-17 ENCOUNTER — ANESTHESIA EVENT (OUTPATIENT)
Dept: SURGERY | Facility: HOSPITAL | Age: 83
End: 2023-05-17
Payer: MEDICARE

## 2023-05-17 NOTE — ANESTHESIA PREPROCEDURE EVALUATION
05/17/2023  Shanthi Can is a 82 y.o., female.      Pre-op Assessment    I have reviewed the Patient Summary Reports.    I have reviewed the NPO Status.   I have reviewed the Medications.     Review of Systems  Anesthesia Hx:  Denies Family Hx of Anesthesia complications.   Denies Personal Hx of Anesthesia complications.   EENT/Dental:   Eyes:   Cardiovascular:   Hypertension Dysrhythmias (p. SVT) PVD hyperlipidemia NYHA Classification II Carotid stenosis   Pulmonary:   COPD Asthma Shortness of breath    Hepatic/GI:   GERD Pancreatitis  diverticulosis   Musculoskeletal:   Arthritis     Endocrine:   Diabetes, type 2 Hypothyroidism      GERD (gastroesophageal reflux disease) Thyroid disease   High cholesterol Hx of pancreatitis   Hypertension IFG (impaired fasting glucose)   Dyspnea Occlusion and stenosis of unspecified carotid artery   COPD (chronic obstructive pulmonary disease) Arthritis   Change in bowel habit SOB (shortness of breath)   COPD exacerbation Type II diabetes mellitus   Viral upper respiratory tract infection Cataract     Surgical History    CHOLECYSTECTOMY TONSILLECTOMY   DILATION AND CURETTAGE OF UTERUS TENDON RELEASE   COLONOSCOPY          Physical Exam  General: Well nourished, Cooperative, Alert and Oriented    Airway:  Mallampati: II   Mouth Opening: Normal  TM Distance: Normal  Tongue: Normal  Neck ROM: Normal ROM        Anesthesia Plan  Type of Anesthesia, risks & benefits discussed:    Anesthesia Type: MAC  Intra-op Monitoring Plan: Standard ASA Monitors  Post Op Pain Control Plan: multimodal analgesia and IV/PO Opioids PRN  Induction:  IV  Informed Consent: Informed consent signed with the Patient and all parties understand the risks and agree with anesthesia plan.  All questions answered.   ASA Score: 3  Day of Surgery Review of History & Physical: H&P Update referred to the  surgeon/provider.I have interviewed and examined the patient. I have reviewed the patient's H&P dated: There are no significant changes. H&P completed by Anesthesiologist.    Ready For Surgery From Anesthesia Perspective.     .

## 2023-05-18 ENCOUNTER — HOSPITAL ENCOUNTER (OUTPATIENT)
Facility: HOSPITAL | Age: 83
Discharge: HOME | End: 2023-05-18
Attending: OPHTHALMOLOGY | Admitting: OPHTHALMOLOGY
Payer: MEDICARE

## 2023-05-18 ENCOUNTER — ANESTHESIA (OUTPATIENT)
Dept: SURGERY | Facility: HOSPITAL | Age: 83
End: 2023-05-18
Payer: MEDICARE

## 2023-05-18 VITALS
OXYGEN SATURATION: 94 % | HEART RATE: 68 BPM | RESPIRATION RATE: 16 BRPM | SYSTOLIC BLOOD PRESSURE: 118 MMHG | DIASTOLIC BLOOD PRESSURE: 59 MMHG | TEMPERATURE: 97 F

## 2023-05-18 DIAGNOSIS — H25.13 NUCLEAR SCLEROSIS, BILATERAL: ICD-10-CM

## 2023-05-18 DIAGNOSIS — H25.11 NUCLEAR SCLEROTIC CATARACT OF RIGHT EYE: Primary | ICD-10-CM

## 2023-05-18 PROCEDURE — 99900035 HC TECH TIME PER 15 MIN (STAT)

## 2023-05-18 PROCEDURE — 66999 PR FEMTO TORIC: ICD-10-PCS | Mod: CSM,RT,, | Performed by: OPHTHALMOLOGY

## 2023-05-18 PROCEDURE — V2787 ASTIGMATISM-CORRECT FUNCTION: HCPCS | Performed by: OPHTHALMOLOGY

## 2023-05-18 PROCEDURE — 36000707: Performed by: OPHTHALMOLOGY

## 2023-05-18 PROCEDURE — 63600175 PHARM REV CODE 636 W HCPCS: Performed by: REGISTERED NURSE

## 2023-05-18 PROCEDURE — 36000706: Performed by: OPHTHALMOLOGY

## 2023-05-18 PROCEDURE — 94761 N-INVAS EAR/PLS OXIMETRY MLT: CPT

## 2023-05-18 PROCEDURE — 37000009 HC ANESTHESIA EA ADD 15 MINS: Performed by: OPHTHALMOLOGY

## 2023-05-18 PROCEDURE — 37000008 HC ANESTHESIA 1ST 15 MINUTES: Performed by: OPHTHALMOLOGY

## 2023-05-18 PROCEDURE — 66984 XCAPSL CTRC RMVL W/O ECP: CPT | Mod: 79,RT,, | Performed by: OPHTHALMOLOGY

## 2023-05-18 PROCEDURE — D9220A PRA ANESTHESIA: Mod: ,,, | Performed by: REGISTERED NURSE

## 2023-05-18 PROCEDURE — 66999 UNLISTED PX ANT SEGMENT EYE: CPT | Mod: CSM,RT,, | Performed by: OPHTHALMOLOGY

## 2023-05-18 PROCEDURE — 66984 PR REMOVAL, CATARACT, W/INSRT INTRAOC LENS, W/O ENDO CYCLO: ICD-10-PCS | Mod: 79,RT,, | Performed by: OPHTHALMOLOGY

## 2023-05-18 PROCEDURE — D9220A PRA ANESTHESIA: ICD-10-PCS | Mod: ,,, | Performed by: REGISTERED NURSE

## 2023-05-18 PROCEDURE — 71000015 HC POSTOP RECOV 1ST HR: Performed by: OPHTHALMOLOGY

## 2023-05-18 PROCEDURE — 25000003 PHARM REV CODE 250: Performed by: OPHTHALMOLOGY

## 2023-05-18 DEVICE — IMPLANTABLE DEVICE: Type: IMPLANTABLE DEVICE | Site: EYE | Status: FUNCTIONAL

## 2023-05-18 RX ORDER — TETRACAINE HYDROCHLORIDE 5 MG/ML
SOLUTION OPHTHALMIC
Status: DISCONTINUED | OUTPATIENT
Start: 2023-05-18 | End: 2023-05-18 | Stop reason: HOSPADM

## 2023-05-18 RX ORDER — CYCLOP/TROP/PROPA/PHEN/KET/WAT 1-1-0.1%
1 DROPS (EA) OPHTHALMIC (EYE)
Status: COMPLETED | OUTPATIENT
Start: 2023-05-18 | End: 2023-05-18

## 2023-05-18 RX ORDER — LIDOCAINE HYDROCHLORIDE 40 MG/ML
INJECTION, SOLUTION RETROBULBAR
Status: DISCONTINUED | OUTPATIENT
Start: 2023-05-18 | End: 2023-05-18 | Stop reason: HOSPADM

## 2023-05-18 RX ORDER — MOXIFLOXACIN 5 MG/ML
1 SOLUTION/ DROPS OPHTHALMIC
Status: COMPLETED | OUTPATIENT
Start: 2023-05-18 | End: 2023-05-18

## 2023-05-18 RX ORDER — MOXIFLOXACIN 5 MG/ML
1 SOLUTION/ DROPS OPHTHALMIC
Status: DISCONTINUED | OUTPATIENT
Start: 2023-05-18 | End: 2023-05-18 | Stop reason: HOSPADM

## 2023-05-18 RX ORDER — FENTANYL CITRATE 50 UG/ML
INJECTION, SOLUTION INTRAMUSCULAR; INTRAVENOUS
Status: DISCONTINUED | OUTPATIENT
Start: 2023-05-18 | End: 2023-05-18

## 2023-05-18 RX ORDER — PHENYLEPHRINE HYDROCHLORIDE 100 MG/ML
1 SOLUTION/ DROPS OPHTHALMIC
Status: DISCONTINUED | OUTPATIENT
Start: 2023-05-18 | End: 2023-05-18 | Stop reason: HOSPADM

## 2023-05-18 RX ORDER — ACETAMINOPHEN 325 MG/1
650 TABLET ORAL EVERY 4 HOURS PRN
Status: DISCONTINUED | OUTPATIENT
Start: 2023-05-18 | End: 2023-05-18 | Stop reason: HOSPADM

## 2023-05-18 RX ORDER — MOXIFLOXACIN 5 MG/ML
SOLUTION/ DROPS OPHTHALMIC
Status: DISCONTINUED | OUTPATIENT
Start: 2023-05-18 | End: 2023-05-18 | Stop reason: HOSPADM

## 2023-05-18 RX ORDER — PROPARACAINE HYDROCHLORIDE 5 MG/ML
1 SOLUTION/ DROPS OPHTHALMIC
Status: DISCONTINUED | OUTPATIENT
Start: 2023-05-18 | End: 2023-05-18 | Stop reason: HOSPADM

## 2023-05-18 RX ADMIN — Medication 1 DROP: at 08:05

## 2023-05-18 RX ADMIN — MOXIFLOXACIN OPHTHALMIC 1 DROP: 5 SOLUTION/ DROPS OPHTHALMIC at 08:05

## 2023-05-18 RX ADMIN — MOXIFLOXACIN OPHTHALMIC 1 DROP: 5 SOLUTION/ DROPS OPHTHALMIC at 09:05

## 2023-05-18 RX ADMIN — MOXIFLOXACIN 1 DROP: 5 SOLUTION/ DROPS OPHTHALMIC at 10:05

## 2023-05-18 RX ADMIN — FENTANYL CITRATE 25 MCG: 50 INJECTION, SOLUTION INTRAMUSCULAR; INTRAVENOUS at 09:05

## 2023-05-18 RX ADMIN — FENTANYL CITRATE 50 MCG: 50 INJECTION, SOLUTION INTRAMUSCULAR; INTRAVENOUS at 10:05

## 2023-05-18 RX ADMIN — Medication 1 DROP: at 09:05

## 2023-05-18 NOTE — OP NOTE
SURGEON:  Mary Smith M.D.    PREOPERATIVE DIAGNOSIS:    Nuclear Sclerotic Cataract Right Eye    POSTOPERATIVE DIAGNOSIS:    Nuclear Sclerotic Cataract Right Eye    PROCEDURES:    Phacoemulsification with  intraocular lens, Right eye (48979)  With ORA  LASER assist    DATE OF SURGERY: 05/18/2023    IMPLANT: [id number] 30.5     ANESTHESIA:  MAC with topical Lidocaine    COMPLICATIONS:  None    ESTIMATED BLOOD LOSS: None    SPECIMENS: None    INDICATIONS:    The patient has a history of painless progressive visual loss and difficulty with activities of daily living, which specifically include difficult driving at night due to glare and difficulty reading small print, secondary to cataract formation.  After a thorough discussion of the risks, benefits, and alternatives to cataract surgery, including, but not limited to, the rare risks of infection, retinal detachment, hemorrhage, need for additional surgery, loss of vision, and even loss of the eye, the patient voices understanding and desires to proceed.    DESCRIPTION OF PROCEDURE:      The patients IOL calculations were reviewed, and the lens selection confirmed.   After verification and marking of the proper eye in the preop holding area, the patient was brought to the operating room in supine position where the eye was prepped and draped in standard sterile fashion with 5% Betadine and a lid speculum placed in the eye.   Topical 4% Lidocaine was used in addition to the preoperative anesthesia and the procedure was begun by the creation of a paracentesis incision through which viscoelastic was used to fill the anterior chamber.  Next, a keratome blade was used to create a triplanar temporal clear corneal incision and a cystotome and Utrata forceps used to fashion a continuous curvilinear capsulorrhexis.  Hydrodissection was carried out using the Raya hydrodissection cannula and the nucleus was found to be mobile.  Phacoemulsification of the nucleus was carried  out using a quick chop technique, and all remaining epinuclear and cortical material was removed.  The eye was then reformed with Viscoelastic and ORA was used to verify the proper IOL power. The intraocular lens was then implanted into the capsular bag.  All remaining viscoelastics were removed from the eye and at the end of the case the pupil was round, the lens was well-centered within the capsular bag and all wounds were found to be water tight.  Drops of Vigamox and Pred Forte were instilled and a shield was placed over the eye. The patient will follow up with Dr. Smith in the morning.

## 2023-05-18 NOTE — DISCHARGE INSTRUCTIONS
CATARACT SURGERY    POST-OPERATIVE INSTRUCTIONS    · Apply drops THREE times a day into operative eye for 30 days.    · DO NOT rub your eye    · Wear protective sunglasses during the day    · Resume moderate activity    · Bathe/shower/wash face normally    · DO NOT apply makeup around the operative eye for 1 week.         You should expect    - Blurry vision and halos for 24-48 hours    - Dilated pupil for 24-48 hours    - Scratchy feeling in the eye for 1-2 days    - Curved shadow in your peripheral vision for 2-3 weeks    - Occasional flickering of lights for up to 1 week    -If you experience severe pain or nausea, please call Dr Smith or the on-call doctor at [phone]    - Plan to see Dr Smith tomorrow .      [facility]    [address] [facility] [address]    ** Most patients can drive the next day, but if you do not feel comfortable driving, please arrange for transportation.

## 2023-05-18 NOTE — DISCHARGE SUMMARY
Outcome: Successful outpatient ophthalmic surgical procedure  Preprinted Instructions given to patient.  Regular diet.  Activity: No restrictions  Meds: see Med Rec  Condition: stable  Follow up: 1 day with Dr Smith  Disposition: Home  Diagnosis: s/p eye surgery  Date of discharge: 05/18/2023

## 2023-05-18 NOTE — TRANSFER OF CARE
Anesthesia Transfer of Care Note    Patient: Shanthi Can    Procedure(s) Performed: Procedure(s) (LRB):  EXTRACTION, CATARACT, WITH IOL INSERTION (Right)    Patient location: PACU    Anesthesia Type: MAC    Transport from OR: Transported from OR on room air with adequate spontaneous ventilation    Post pain: adequate analgesia    Post assessment: no apparent anesthetic complications and tolerated procedure well    Post vital signs: stable    Level of consciousness: awake    Nausea/Vomiting: no nausea/vomiting    Complications: none    Transfer of care protocol was followed      Last vitals:   Visit Vitals  /60 (BP Location: Right arm, Patient Position: Sitting)   Pulse 72   Temp 36.4 °C (97.5 °F) (Oral)   Resp 16   SpO2 98%   Breastfeeding No

## 2023-05-18 NOTE — ANESTHESIA POSTPROCEDURE EVALUATION
Anesthesia Post Evaluation    Patient: Shanthi Can    Procedure(s) Performed: Procedure(s) (LRB):  EXTRACTION, CATARACT, WITH IOL INSERTION (Right)    Final Anesthesia Type: MAC      Patient location during evaluation: PACU  Patient participation: Yes- Able to Participate  Level of consciousness: awake and alert  Post-procedure vital signs: reviewed and stable  Pain management: adequate  Airway patency: patent    PONV status at discharge: No PONV  Anesthetic complications: no      Cardiovascular status: blood pressure returned to baseline  Respiratory status: unassisted, spontaneous ventilation and room air  Hydration status: euvolemic  Follow-up not needed.          Vitals Value Taken Time   /63 05/18/23 1018   Temp 36.2 °C (97.2 °F) 05/18/23 1015   Pulse 70 05/18/23 1026   Resp 16 05/18/23 1015   SpO2 95 % 05/18/23 1026   Vitals shown include unvalidated device data.      No case tracking events are documented in the log.      Pain/Deya Score: Deya Score: 10 (5/18/2023 10:15 AM)

## 2023-05-19 ENCOUNTER — OFFICE VISIT (OUTPATIENT)
Dept: OPHTHALMOLOGY | Facility: CLINIC | Age: 83
End: 2023-05-19
Payer: MEDICARE

## 2023-05-19 DIAGNOSIS — H25.13 NUCLEAR SCLEROTIC CATARACT, BILATERAL: ICD-10-CM

## 2023-05-19 DIAGNOSIS — Z98.890 POST-OPERATIVE STATE: Primary | ICD-10-CM

## 2023-05-19 PROCEDURE — 1159F MED LIST DOCD IN RCRD: CPT | Mod: CPTII,,, | Performed by: OPHTHALMOLOGY

## 2023-05-19 PROCEDURE — 99999 PR PBB SHADOW E&M-EST. PATIENT-LVL IV: CPT | Mod: PBBFAC,,, | Performed by: OPHTHALMOLOGY

## 2023-05-19 PROCEDURE — 3288F FALL RISK ASSESSMENT DOCD: CPT | Mod: CPTII,,, | Performed by: OPHTHALMOLOGY

## 2023-05-19 PROCEDURE — 1160F PR REVIEW ALL MEDS BY PRESCRIBER/CLIN PHARMACIST DOCUMENTED: ICD-10-PCS | Mod: CPTII,,, | Performed by: OPHTHALMOLOGY

## 2023-05-19 PROCEDURE — 1159F PR MEDICATION LIST DOCUMENTED IN MEDICAL RECORD: ICD-10-PCS | Mod: CPTII,,, | Performed by: OPHTHALMOLOGY

## 2023-05-19 PROCEDURE — 3288F PR FALLS RISK ASSESSMENT DOCUMENTED: ICD-10-PCS | Mod: CPTII,,, | Performed by: OPHTHALMOLOGY

## 2023-05-19 PROCEDURE — 1101F PT FALLS ASSESS-DOCD LE1/YR: CPT | Mod: CPTII,,, | Performed by: OPHTHALMOLOGY

## 2023-05-19 PROCEDURE — 1126F PR PAIN SEVERITY QUANTIFIED, NO PAIN PRESENT: ICD-10-PCS | Mod: CPTII,,, | Performed by: OPHTHALMOLOGY

## 2023-05-19 PROCEDURE — 99214 OFFICE O/P EST MOD 30 MIN: CPT | Performed by: OPHTHALMOLOGY

## 2023-05-19 PROCEDURE — 99024 PR POST-OP FOLLOW-UP VISIT: ICD-10-PCS | Mod: ,,, | Performed by: OPHTHALMOLOGY

## 2023-05-19 PROCEDURE — 1101F PR PT FALLS ASSESS DOC 0-1 FALLS W/OUT INJ PAST YR: ICD-10-PCS | Mod: CPTII,,, | Performed by: OPHTHALMOLOGY

## 2023-05-19 PROCEDURE — 1126F AMNT PAIN NOTED NONE PRSNT: CPT | Mod: CPTII,,, | Performed by: OPHTHALMOLOGY

## 2023-05-19 PROCEDURE — 99999 PR PBB SHADOW E&M-EST. PATIENT-LVL IV: ICD-10-PCS | Mod: PBBFAC,,, | Performed by: OPHTHALMOLOGY

## 2023-05-19 PROCEDURE — 99024 POSTOP FOLLOW-UP VISIT: CPT | Mod: ,,, | Performed by: OPHTHALMOLOGY

## 2023-05-19 PROCEDURE — 1160F RVW MEDS BY RX/DR IN RCRD: CPT | Mod: CPTII,,, | Performed by: OPHTHALMOLOGY

## 2023-05-19 NOTE — PROGRESS NOTES
HPI    1 day po Phaco/IOL OD  Vision is good and no pain  PGB TID OD    DATE OF SURGERY: 05/18/2023     IMPLANT: [id number] 30.5   Last edited by Angela Gallardo on 5/19/2023  9:35 AM.            Assessment /Plan     For exam results, see Encounter Report.    Post-operative state    Nuclear sclerotic cataract, bilateral      Slit lamp exam:  L/L: nl  K: clear, wound sealed  AC: 1+ cell  Lens: IOL centered and stable    POD1 s/p Phaco/IOL  Appropriate precautions and post op medications reviewed.  Patient instructed to call or come in if symptoms of redness, decreased vision, or pain are experienced.

## 2023-06-05 DIAGNOSIS — N81.10 PROLAPSE OF ANTERIOR VAGINAL WALL: ICD-10-CM

## 2023-06-05 DIAGNOSIS — Z46.89 FITTING AND ADJUSTMENT OF PESSARY: ICD-10-CM

## 2023-06-05 DIAGNOSIS — N81.11 CYSTOCELE, MIDLINE: ICD-10-CM

## 2023-06-05 DIAGNOSIS — N39.3 STRESS INCONTINENCE: ICD-10-CM

## 2023-06-06 RX ORDER — OXYQUINOLINE SULFATE AND SODIUM LAURYL SULFATE .25; .1 MG/G; MG/G
JELLY VAGINAL
Qty: 1 EACH | Refills: 2 | Status: SHIPPED | OUTPATIENT
Start: 2023-06-06 | End: 2024-02-02 | Stop reason: SDUPTHER

## 2023-06-19 ENCOUNTER — TELEPHONE (OUTPATIENT)
Dept: OBSTETRICS AND GYNECOLOGY | Facility: CLINIC | Age: 83
End: 2023-06-19
Payer: MEDICARE

## 2023-06-20 PROBLEM — R06.09 DOE (DYSPNEA ON EXERTION): Status: ACTIVE | Noted: 2023-06-20

## 2023-06-29 PROBLEM — R79.82 ELEVATED C-REACTIVE PROTEIN (CRP): Status: ACTIVE | Noted: 2023-06-29

## 2023-06-29 PROBLEM — J06.9 URI (UPPER RESPIRATORY INFECTION): Status: RESOLVED | Noted: 2023-04-05 | Resolved: 2023-06-29

## 2023-07-05 ENCOUNTER — TELEPHONE (OUTPATIENT)
Dept: OPHTHALMOLOGY | Facility: CLINIC | Age: 83
End: 2023-07-05
Payer: MEDICARE

## 2023-07-05 NOTE — TELEPHONE ENCOUNTER
----- Message from Fallon Alejandra OD sent at 7/3/2023  4:59 PM CDT -----  Regarding: FW: Post op R/s  Double book of 7/7 is fine  ----- Message -----  From: Yvonne Conway  Sent: 7/3/2023   4:21 PM CDT  To: Fallon Alejandra Staff  Subject: Post op R/s                                      Pt called about r/s today cancelled post op and 7/5 does not work for pt. Pt asked  7/7 was available for late morning or early afternoon.    Call back- [phone]

## 2023-07-17 ENCOUNTER — OFFICE VISIT (OUTPATIENT)
Dept: OPTOMETRY | Facility: CLINIC | Age: 83
End: 2023-07-17
Payer: MEDICARE

## 2023-07-17 DIAGNOSIS — Z98.41 STATUS POST CATARACT EXTRACTION OF BOTH EYES WITH INSERTION OF INTRAOCULAR LENS: Primary | ICD-10-CM

## 2023-07-17 DIAGNOSIS — Z96.1 STATUS POST CATARACT EXTRACTION OF BOTH EYES WITH INSERTION OF INTRAOCULAR LENS: Primary | ICD-10-CM

## 2023-07-17 DIAGNOSIS — Z98.42 STATUS POST CATARACT EXTRACTION OF BOTH EYES WITH INSERTION OF INTRAOCULAR LENS: Primary | ICD-10-CM

## 2023-07-17 PROCEDURE — 99999 PR PBB SHADOW E&M-EST. PATIENT-LVL III: ICD-10-PCS | Mod: PBBFAC,,, | Performed by: OPTOMETRIST

## 2023-07-17 PROCEDURE — 1159F PR MEDICATION LIST DOCUMENTED IN MEDICAL RECORD: ICD-10-PCS | Mod: CPTII,S$GLB,, | Performed by: OPTOMETRIST

## 2023-07-17 PROCEDURE — 99024 PR POST-OP FOLLOW-UP VISIT: ICD-10-PCS | Mod: S$GLB,,, | Performed by: OPTOMETRIST

## 2023-07-17 PROCEDURE — 3288F FALL RISK ASSESSMENT DOCD: CPT | Mod: CPTII,S$GLB,, | Performed by: OPTOMETRIST

## 2023-07-17 PROCEDURE — 1159F MED LIST DOCD IN RCRD: CPT | Mod: CPTII,S$GLB,, | Performed by: OPTOMETRIST

## 2023-07-17 PROCEDURE — 1101F PR PT FALLS ASSESS DOC 0-1 FALLS W/OUT INJ PAST YR: ICD-10-PCS | Mod: CPTII,S$GLB,, | Performed by: OPTOMETRIST

## 2023-07-17 PROCEDURE — 1101F PT FALLS ASSESS-DOCD LE1/YR: CPT | Mod: CPTII,S$GLB,, | Performed by: OPTOMETRIST

## 2023-07-17 PROCEDURE — 99999 PR PBB SHADOW E&M-EST. PATIENT-LVL III: CPT | Mod: PBBFAC,,, | Performed by: OPTOMETRIST

## 2023-07-17 PROCEDURE — 99024 POSTOP FOLLOW-UP VISIT: CPT | Mod: S$GLB,,, | Performed by: OPTOMETRIST

## 2023-07-17 PROCEDURE — 3288F PR FALLS RISK ASSESSMENT DOCUMENTED: ICD-10-PCS | Mod: CPTII,S$GLB,, | Performed by: OPTOMETRIST

## 2023-07-17 NOTE — PROGRESS NOTES
HPI    82 y.o. is here for cataract post op  Pt states eyes are extremely dry since cataract sx, using AT gtts 4 times   a day  Pt wear otc readers +1.50  Pt wants to get an rx for gls only if needed  (-) Changes in vision   (-) Pain  (-) Irritation   (+) Itching   (-) Flashes  (+) Floaters  (-) Glasses wearer  (-) CL wearer  (+) Uses eye gtts Systane   (-) Eye injury  (-) Eye surgery Cataract sx OU  (-)FOHx  (+)DM    Last edited by Fallon Alejandra, OD on 7/17/2023  3:00 PM.            Assessment /Plan     For exam results, see Encounter Report.    Status post cataract extraction of both eyes with insertion of intraocular lens      Educated on today's WNL findings OU. Eyes well healed from cataract surgery OU. Pt OK to use OTC readers.     Pt elects to resume annual eye care with her local doctor in [place]  RTC PRN

## 2023-08-01 ENCOUNTER — OFFICE VISIT (OUTPATIENT)
Dept: OBSTETRICS AND GYNECOLOGY | Facility: CLINIC | Age: 83
End: 2023-08-01
Payer: MEDICARE

## 2023-08-01 VITALS — DIASTOLIC BLOOD PRESSURE: 56 MMHG | SYSTOLIC BLOOD PRESSURE: 126 MMHG | BODY MASS INDEX: 29.69 KG/M2 | WEIGHT: 147 LBS

## 2023-08-01 DIAGNOSIS — N81.10 PROLAPSE OF ANTERIOR VAGINAL WALL: ICD-10-CM

## 2023-08-01 DIAGNOSIS — N81.11 CYSTOCELE, MIDLINE: Primary | ICD-10-CM

## 2023-08-01 DIAGNOSIS — N39.3 STRESS INCONTINENCE: ICD-10-CM

## 2023-08-01 DIAGNOSIS — Z46.89 PESSARY MAINTENANCE: ICD-10-CM

## 2023-08-01 PROCEDURE — 1160F PR REVIEW ALL MEDS BY PRESCRIBER/CLIN PHARMACIST DOCUMENTED: ICD-10-PCS | Mod: CPTII,S$GLB,, | Performed by: OBSTETRICS & GYNECOLOGY

## 2023-08-01 PROCEDURE — 99999 PR PBB SHADOW E&M-EST. PATIENT-LVL III: ICD-10-PCS | Mod: PBBFAC,,, | Performed by: OBSTETRICS & GYNECOLOGY

## 2023-08-01 PROCEDURE — 99213 OFFICE O/P EST LOW 20 MIN: CPT | Mod: S$GLB,,, | Performed by: OBSTETRICS & GYNECOLOGY

## 2023-08-01 PROCEDURE — 3074F PR MOST RECENT SYSTOLIC BLOOD PRESSURE < 130 MM HG: ICD-10-PCS | Mod: CPTII,S$GLB,, | Performed by: OBSTETRICS & GYNECOLOGY

## 2023-08-01 PROCEDURE — 99213 PR OFFICE/OUTPT VISIT, EST, LEVL III, 20-29 MIN: ICD-10-PCS | Mod: S$GLB,,, | Performed by: OBSTETRICS & GYNECOLOGY

## 2023-08-01 PROCEDURE — 3078F PR MOST RECENT DIASTOLIC BLOOD PRESSURE < 80 MM HG: ICD-10-PCS | Mod: CPTII,S$GLB,, | Performed by: OBSTETRICS & GYNECOLOGY

## 2023-08-01 PROCEDURE — 3074F SYST BP LT 130 MM HG: CPT | Mod: CPTII,S$GLB,, | Performed by: OBSTETRICS & GYNECOLOGY

## 2023-08-01 PROCEDURE — 1160F RVW MEDS BY RX/DR IN RCRD: CPT | Mod: CPTII,S$GLB,, | Performed by: OBSTETRICS & GYNECOLOGY

## 2023-08-01 PROCEDURE — 99999 PR PBB SHADOW E&M-EST. PATIENT-LVL III: CPT | Mod: PBBFAC,,, | Performed by: OBSTETRICS & GYNECOLOGY

## 2023-08-01 PROCEDURE — 1159F MED LIST DOCD IN RCRD: CPT | Mod: CPTII,S$GLB,, | Performed by: OBSTETRICS & GYNECOLOGY

## 2023-08-01 PROCEDURE — 3078F DIAST BP <80 MM HG: CPT | Mod: CPTII,S$GLB,, | Performed by: OBSTETRICS & GYNECOLOGY

## 2023-08-01 PROCEDURE — 1159F PR MEDICATION LIST DOCUMENTED IN MEDICAL RECORD: ICD-10-PCS | Mod: CPTII,S$GLB,, | Performed by: OBSTETRICS & GYNECOLOGY

## 2023-08-01 NOTE — PROGRESS NOTES
"Subjective:    Patient ID: Shanthi Can is a 82 y.o. y.o. female.     Chief Complaint:   Chief Complaint   Patient presents with    Procedure     Pessary cleaning. Patient states she does not feel any of the pressure anymore, but still wears pads due to leaking of urine. Pt states this was a problem "way before my uterus fell"       History of Present Illness:   Shanthi presents for a pessary check. She has been doing well wearing the pessary and has no complaints of bleeding or pain.    MEDICATION LIST    Current Outpatient Medications:     albuterol-ipratropium (DUO-NEB) 2.5 mg-0.5 mg/3 mL nebulizer solution, Take 3 mLs by nebulization every 4 (four) hours as needed for Wheezing or Shortness of Breath. Rescue, Disp: 75 mL, Rfl: 0    albuterol-ipratropium (DUO-NEB) 2.5 mg-0.5 mg/3 mL nebulizer solution, Take 3 mLs by nebulization every 6 (six) hours as needed for Wheezing. Rescue, Disp: 75 mL, Rfl: 0    ascorbic acid, vitamin C, (VITAMIN C) 500 MG tablet, Take 500 mg by mouth once daily., Disp: , Rfl:     aspirin (ECOTRIN) 81 MG EC tablet, Take 81 mg by mouth once daily. STOPPED 04/19/2022, Disp: , Rfl:     azelastine 0.15 % (205.5 mcg) [place], , Disp: , Rfl:     budesonide (PULMICORT) 0.5 mg/2 mL nebulizer solution, Take 2 mLs (0.5 mg total) by nebulization 2 (two) times a day. Controller, Disp: 120 mL, Rfl: 0    calcium carbonate (OS-SARA) 600 mg calcium (1,500 mg) Tab, Take 600 mg by mouth., Disp: , Rfl:     coQ10, ubiquinol, 100 mg Cap, Take 200 mg by mouth every evening., Disp: 180 capsule, Rfl: 2    fluticasone propionate (XHANCE NASL), 1 spray by Nasal route 2 (two) times a day. Prescribed by ENT, Disp: , Rfl:     gabapentin (NEURONTIN) 100 MG capsule, Take 1 capsule (100 mg total) by mouth 2 (two) times daily., Disp: 180 capsule, Rfl: 1    glucosamine-chondroitin 500-400 mg tablet, Take 1 tablet by mouth 3 (three) times daily., Disp: , Rfl:     guaiFENesin (MUCINEX) 600 mg 12 hr tablet, Take 1,200 mg " by mouth 2 (two) times daily., Disp: , Rfl:     levothyroxine (SYNTHROID) 100 MCG tablet, TAKE 1 TABLET EVERY DAY, Disp: 90 tablet, Rfl: 1    magnesium 250 mg Tab, Take 400 mg by mouth every morning. 250mg QAM and 500mg QHS, Disp: , Rfl:     metoprolol succinate (TOPROL-XL) 50 MG 24 hr tablet, Take 1 tablet (50 mg total) by mouth once daily., Disp: 30 tablet, Rfl: 11    mupirocin (BACTROBAN) 2 % ointment, , Disp: , Rfl:     nebulizer and compressor Irene, 1 kit by Misc.(Non-Drug; Combo Route) route every 6 (six) hours as needed (wheezing SOB persistent cough)., Disp: 1 each, Rfl: 0    omega 3-dha-epa-fish oil (FISH OIL) 100-160-1,000 mg Cap, Take by mouth., Disp: , Rfl:     oxyquinoline-sod.lauryl sulfat (TRIMO-DUGAN JELLY) 0.025-0.01 % Gel, INSERT/APPLY 1 APPLICATORFUL VAGINALLY TWICE A WEEK AS DIRECTED, Disp: 1 each, Rfl: 2    pantoprazole (PROTONIX) 40 MG tablet, TAKE 1 TABLET EVERY DAY, Disp: 90 tablet, Rfl: 2    potassium citrate 99 mg Cap, Take 1 capsule by mouth., Disp: , Rfl:     rosuvastatin (CRESTOR) 40 MG Tab, TAKE 1 TABLET EVERY DAY, Disp: 90 tablet, Rfl: 2    TRULICITY 0.75 mg/0.5 mL pen injector, Inject 0.75 mg into the skin every 7 days., Disp: , Rfl:     PRE-PESSARY CHECK COUNSELING:  The patient was informed of the risks of pain or discomfort, continuous incontinence, urinary retention with insertion of a pessary and malodorous discharge and/or possible bleeding from granulation tissue after wearing the pessary for sometime. She was counseled on the alternatives to pessary insertion, including surgery or no treatment with continued symptoms, and she agrees to proceed.    PROCEDURE:  TIME OUT PERFORMED.  The pessary was removed and cleaned with soap and water.  There was no granulation tissue present. There is a mild area of erythema along the vaginal wall at the site of the pessary but no ulceration or granulation was noted.  There was a slight discharge present.  The pessary was re-inserted.  The  patient was able to sit, stand, walk and either cough or urinate on the toilet after the procedure normally without expelling the pessary. The patient tolerated the procedure well.    TRIMOSAN vaginal cream prescribed--continue use        ASSESSMENT:      1. Cystocele, midline    2. Prolapse of anterior vaginal wall    3. Stress incontinence    4. Pessary maintenance        POST PESSARY CHECK COUNSELING:  Report worsening urinary incontinency, urinary retention, pain, fever, foul smelling discharge or bleeding.  Importance of keeping follow-up appointments for a pessary check stressed.    Counseling lasted approximately 10 minutes and all her questions were answered.    FOLLOW-UP: In 3 months for pessary check.      Karen Mota MD FACOG    08/01/2023  9:21 AM

## 2023-08-15 ENCOUNTER — LAB VISIT (OUTPATIENT)
Dept: LAB | Facility: HOSPITAL | Age: 83
End: 2023-08-15
Attending: INTERNAL MEDICINE
Payer: MEDICARE

## 2023-08-15 DIAGNOSIS — R79.82 ELEVATED C-REACTIVE PROTEIN (CRP): ICD-10-CM

## 2023-08-15 DIAGNOSIS — E03.8 OTHER SPECIFIED HYPOTHYROIDISM: ICD-10-CM

## 2023-08-15 DIAGNOSIS — E66.9 OBESITY, UNSPECIFIED CLASSIFICATION, UNSPECIFIED OBESITY TYPE, UNSPECIFIED WHETHER SERIOUS COMORBIDITY PRESENT: ICD-10-CM

## 2023-08-15 DIAGNOSIS — E78.2 MIXED HYPERLIPIDEMIA: ICD-10-CM

## 2023-08-15 DIAGNOSIS — I10 ESSENTIAL HYPERTENSION: ICD-10-CM

## 2023-08-15 DIAGNOSIS — E11.69 TYPE 2 DIABETES MELLITUS WITH OTHER SPECIFIED COMPLICATION, WITHOUT LONG-TERM CURRENT USE OF INSULIN: ICD-10-CM

## 2023-08-15 DIAGNOSIS — I65.23 BILATERAL CAROTID ARTERY STENOSIS: ICD-10-CM

## 2023-08-15 DIAGNOSIS — I15.2 HYPERTENSION ASSOCIATED WITH TYPE 2 DIABETES MELLITUS: ICD-10-CM

## 2023-08-15 DIAGNOSIS — E78.00 PURE HYPERCHOLESTEROLEMIA: ICD-10-CM

## 2023-08-15 DIAGNOSIS — R73.01 IFG (IMPAIRED FASTING GLUCOSE): ICD-10-CM

## 2023-08-15 DIAGNOSIS — E11.59 HYPERTENSION ASSOCIATED WITH TYPE 2 DIABETES MELLITUS: ICD-10-CM

## 2023-08-15 DIAGNOSIS — Z79.899 ENCOUNTER FOR LONG-TERM (CURRENT) USE OF OTHER MEDICATIONS: ICD-10-CM

## 2023-08-15 LAB
25(OH)D3+25(OH)D2 SERPL-MCNC: 56 NG/ML (ref 30–96)
ALBUMIN SERPL BCP-MCNC: 3.9 G/DL (ref 3.5–5.2)
ALBUMIN/CREAT UR: 14.8 UG/MG (ref 0–30)
ALP SERPL-CCNC: 43 U/L (ref 55–135)
ALT SERPL W/O P-5'-P-CCNC: 19 U/L (ref 10–44)
ANION GAP SERPL CALC-SCNC: 6 MMOL/L (ref 8–16)
AST SERPL-CCNC: 17 U/L (ref 10–40)
BASOPHILS # BLD AUTO: 0.04 K/UL (ref 0–0.2)
BASOPHILS NFR BLD: 0.6 % (ref 0–1.9)
BILIRUB SERPL-MCNC: 0.3 MG/DL (ref 0.1–1)
BUN SERPL-MCNC: 12 MG/DL (ref 8–23)
CALCIUM SERPL-MCNC: 9.4 MG/DL (ref 8.7–10.5)
CHLORIDE SERPL-SCNC: 104 MMOL/L (ref 95–110)
CHOLEST SERPL-MCNC: 106 MG/DL (ref 120–199)
CHOLEST/HDLC SERPL: 2.4 {RATIO} (ref 2–5)
CO2 SERPL-SCNC: 27 MMOL/L (ref 23–29)
CREAT SERPL-MCNC: 0.6 MG/DL (ref 0.5–1.4)
CREAT UR-MCNC: 52.6 MG/DL (ref 15–325)
CRP SERPL-MCNC: 0.41 MG/DL (ref 0–0.75)
DIFFERENTIAL METHOD: NORMAL
EOSINOPHIL # BLD AUTO: 0.1 K/UL (ref 0–0.5)
EOSINOPHIL NFR BLD: 1.4 % (ref 0–8)
ERYTHROCYTE [DISTWIDTH] IN BLOOD BY AUTOMATED COUNT: 12.7 % (ref 11.5–14.5)
EST. GFR  (NO RACE VARIABLE): >60 ML/MIN/1.73 M^2
ESTIMATED AVG GLUCOSE: 126 MG/DL (ref 68–131)
FOLATE SERPL-MCNC: 13.2 NG/ML (ref 4–24)
GLUCOSE SERPL-MCNC: 103 MG/DL (ref 70–110)
HBA1C MFR BLD: 6 % (ref 4–5.6)
HCT VFR BLD AUTO: 39 % (ref 37–48.5)
HCYS SERPL-SCNC: 6 UMOL/L (ref 4–15.5)
HDLC SERPL-MCNC: 45 MG/DL (ref 40–75)
HDLC SERPL: 42.5 % (ref 20–50)
HGB BLD-MCNC: 12.7 G/DL (ref 12–16)
IMM GRANULOCYTES # BLD AUTO: 0.02 K/UL (ref 0–0.04)
IMM GRANULOCYTES NFR BLD AUTO: 0.3 % (ref 0–0.5)
INSULIN COLLECTION INTERVAL: NORMAL
INSULIN SERPL-ACNC: 14.5 UU/ML
LDLC SERPL CALC-MCNC: 28.2 MG/DL (ref 63–159)
LYMPHOCYTES # BLD AUTO: 2.2 K/UL (ref 1–4.8)
LYMPHOCYTES NFR BLD: 29.8 % (ref 18–48)
MAGNESIUM SERPL-MCNC: 2.4 MG/DL (ref 1.6–2.6)
MCH RBC QN AUTO: 30.4 PG (ref 27–31)
MCHC RBC AUTO-ENTMCNC: 32.6 G/DL (ref 32–36)
MCV RBC AUTO: 93 FL (ref 82–98)
MICROALBUMIN UR DL<=1MG/L-MCNC: 7.8 MG/L
MONOCYTES # BLD AUTO: 0.6 K/UL (ref 0.3–1)
MONOCYTES NFR BLD: 8.3 % (ref 4–15)
NEUTROPHILS # BLD AUTO: 4.3 K/UL (ref 1.8–7.7)
NEUTROPHILS NFR BLD: 59.6 % (ref 38–73)
NONHDLC SERPL-MCNC: 61 MG/DL
NRBC BLD-RTO: 0 /100 WBC
PLATELET # BLD AUTO: 220 K/UL (ref 150–450)
PMV BLD AUTO: 10.7 FL (ref 9.2–12.9)
POTASSIUM SERPL-SCNC: 4.5 MMOL/L (ref 3.5–5.1)
PROT SERPL-MCNC: 7.6 G/DL (ref 6–8.4)
RBC # BLD AUTO: 4.18 M/UL (ref 4–5.4)
SODIUM SERPL-SCNC: 137 MMOL/L (ref 136–145)
T4 FREE SERPL-MCNC: 1.3 NG/DL (ref 0.71–1.51)
TRIGL SERPL-MCNC: 164 MG/DL (ref 30–150)
TSH SERPL DL<=0.005 MIU/L-ACNC: 0.42 UIU/ML (ref 0.4–4)
VIT B12 SERPL-MCNC: 603 PG/ML (ref 210–950)
WBC # BLD AUTO: 7.21 K/UL (ref 3.9–12.7)

## 2023-08-15 PROCEDURE — 82607 VITAMIN B-12: CPT | Performed by: INTERNAL MEDICINE

## 2023-08-15 PROCEDURE — 82746 ASSAY OF FOLIC ACID SERUM: CPT | Performed by: INTERNAL MEDICINE

## 2023-08-15 PROCEDURE — 85025 COMPLETE CBC W/AUTO DIFF WBC: CPT | Performed by: INTERNAL MEDICINE

## 2023-08-15 PROCEDURE — 36415 COLL VENOUS BLD VENIPUNCTURE: CPT | Performed by: INTERNAL MEDICINE

## 2023-08-15 PROCEDURE — 80053 COMPREHEN METABOLIC PANEL: CPT | Performed by: INTERNAL MEDICINE

## 2023-08-15 PROCEDURE — 83735 ASSAY OF MAGNESIUM: CPT | Performed by: INTERNAL MEDICINE

## 2023-08-15 PROCEDURE — 83036 HEMOGLOBIN GLYCOSYLATED A1C: CPT | Performed by: INTERNAL MEDICINE

## 2023-08-15 PROCEDURE — 84443 ASSAY THYROID STIM HORMONE: CPT | Performed by: INTERNAL MEDICINE

## 2023-08-15 PROCEDURE — 82306 VITAMIN D 25 HYDROXY: CPT | Performed by: INTERNAL MEDICINE

## 2023-08-15 PROCEDURE — 83525 ASSAY OF INSULIN: CPT | Performed by: INTERNAL MEDICINE

## 2023-08-15 PROCEDURE — 86140 C-REACTIVE PROTEIN: CPT | Performed by: INTERNAL MEDICINE

## 2023-08-15 PROCEDURE — 82570 ASSAY OF URINE CREATININE: CPT | Performed by: INTERNAL MEDICINE

## 2023-08-15 PROCEDURE — 84439 ASSAY OF FREE THYROXINE: CPT | Performed by: INTERNAL MEDICINE

## 2023-08-15 PROCEDURE — 80061 LIPID PANEL: CPT | Performed by: INTERNAL MEDICINE

## 2023-08-15 PROCEDURE — 82172 ASSAY OF APOLIPOPROTEIN: CPT | Performed by: INTERNAL MEDICINE

## 2023-08-15 PROCEDURE — 83090 ASSAY OF HOMOCYSTEINE: CPT | Performed by: INTERNAL MEDICINE

## 2023-08-16 LAB — APO B SERPL-MCNC: 54 MG/DL

## 2023-11-01 ENCOUNTER — OFFICE VISIT (OUTPATIENT)
Dept: OBSTETRICS AND GYNECOLOGY | Facility: CLINIC | Age: 83
End: 2023-11-01
Payer: MEDICARE

## 2023-11-01 VITALS
SYSTOLIC BLOOD PRESSURE: 132 MMHG | WEIGHT: 146 LBS | HEART RATE: 66 BPM | DIASTOLIC BLOOD PRESSURE: 82 MMHG | BODY MASS INDEX: 29.49 KG/M2

## 2023-11-01 DIAGNOSIS — N81.10 PROLAPSE OF ANTERIOR VAGINAL WALL: ICD-10-CM

## 2023-11-01 DIAGNOSIS — N81.11 CYSTOCELE, MIDLINE: Primary | ICD-10-CM

## 2023-11-01 DIAGNOSIS — Z12.31 SCREENING MAMMOGRAM, ENCOUNTER FOR: ICD-10-CM

## 2023-11-01 DIAGNOSIS — N39.3 STRESS INCONTINENCE: ICD-10-CM

## 2023-11-01 DIAGNOSIS — Z46.89 PESSARY MAINTENANCE: ICD-10-CM

## 2023-11-01 PROCEDURE — 3075F PR MOST RECENT SYSTOLIC BLOOD PRESS GE 130-139MM HG: ICD-10-PCS | Mod: CPTII,S$GLB,, | Performed by: OBSTETRICS & GYNECOLOGY

## 2023-11-01 PROCEDURE — 1159F PR MEDICATION LIST DOCUMENTED IN MEDICAL RECORD: ICD-10-PCS | Mod: CPTII,S$GLB,, | Performed by: OBSTETRICS & GYNECOLOGY

## 2023-11-01 PROCEDURE — 99213 OFFICE O/P EST LOW 20 MIN: CPT | Mod: S$GLB,,, | Performed by: OBSTETRICS & GYNECOLOGY

## 2023-11-01 PROCEDURE — 1160F PR REVIEW ALL MEDS BY PRESCRIBER/CLIN PHARMACIST DOCUMENTED: ICD-10-PCS | Mod: CPTII,S$GLB,, | Performed by: OBSTETRICS & GYNECOLOGY

## 2023-11-01 PROCEDURE — 3079F PR MOST RECENT DIASTOLIC BLOOD PRESSURE 80-89 MM HG: ICD-10-PCS | Mod: CPTII,S$GLB,, | Performed by: OBSTETRICS & GYNECOLOGY

## 2023-11-01 PROCEDURE — 1159F MED LIST DOCD IN RCRD: CPT | Mod: CPTII,S$GLB,, | Performed by: OBSTETRICS & GYNECOLOGY

## 2023-11-01 PROCEDURE — 99999 PR PBB SHADOW E&M-EST. PATIENT-LVL II: ICD-10-PCS | Mod: PBBFAC,,, | Performed by: OBSTETRICS & GYNECOLOGY

## 2023-11-01 PROCEDURE — 99213 PR OFFICE/OUTPT VISIT, EST, LEVL III, 20-29 MIN: ICD-10-PCS | Mod: S$GLB,,, | Performed by: OBSTETRICS & GYNECOLOGY

## 2023-11-01 PROCEDURE — 99999 PR PBB SHADOW E&M-EST. PATIENT-LVL II: CPT | Mod: PBBFAC,,, | Performed by: OBSTETRICS & GYNECOLOGY

## 2023-11-01 PROCEDURE — 3079F DIAST BP 80-89 MM HG: CPT | Mod: CPTII,S$GLB,, | Performed by: OBSTETRICS & GYNECOLOGY

## 2023-11-01 PROCEDURE — 1160F RVW MEDS BY RX/DR IN RCRD: CPT | Mod: CPTII,S$GLB,, | Performed by: OBSTETRICS & GYNECOLOGY

## 2023-11-01 PROCEDURE — 3075F SYST BP GE 130 - 139MM HG: CPT | Mod: CPTII,S$GLB,, | Performed by: OBSTETRICS & GYNECOLOGY

## 2023-11-01 NOTE — PROGRESS NOTES
Subjective:    Patient ID: Shanthi Can is a 82 y.o. y.o. female.     Chief Complaint:   Chief Complaint   Patient presents with    Procedure       History of Present Illness:   Shanthi presents for a pessary check. She has been doing well wearing the pessary and has no complaints of bleeding or pain.  Still using the trimo-lopez jelly.    MEDICATION LIST    Current Outpatient Medications:     albuterol-ipratropium (DUO-NEB) 2.5 mg-0.5 mg/3 mL nebulizer solution, Take 3 mLs by nebulization every 4 (four) hours as needed for Wheezing or Shortness of Breath. Rescue, Disp: 75 mL, Rfl: 0    albuterol-ipratropium (DUO-NEB) 2.5 mg-0.5 mg/3 mL nebulizer solution, Take 3 mLs by nebulization every 6 (six) hours as needed for Wheezing. Rescue, Disp: 75 mL, Rfl: 0    ascorbic acid, vitamin C, (VITAMIN C) 500 MG tablet, Take 500 mg by mouth once daily., Disp: , Rfl:     aspirin (ECOTRIN) 81 MG EC tablet, Take 81 mg by mouth once daily. STOPPED 04/19/2022, Disp: , Rfl:     azelastine 0.15 % (205.5 mcg) [place], , Disp: , Rfl:     budesonide (PULMICORT) 0.5 mg/2 mL nebulizer solution, Take 2 mLs (0.5 mg total) by nebulization 2 (two) times a day. Controller, Disp: 120 mL, Rfl: 0    calcium carbonate (OS-SARA) 600 mg calcium (1,500 mg) Tab, Take 600 mg by mouth., Disp: , Rfl:     coQ10, ubiquinol, 100 mg Cap, Take 200 mg by mouth every evening., Disp: 180 capsule, Rfl: 2    fluticasone propionate (XHANCE NASL), 1 spray by Nasal route 2 (two) times a day. Prescribed by ENT, Disp: , Rfl:     glucosamine-chondroitin 500-400 mg tablet, Take 1 tablet by mouth 3 (three) times daily., Disp: , Rfl:     guaiFENesin (MUCINEX) 600 mg 12 hr tablet, Take 1,200 mg by mouth 2 (two) times daily., Disp: , Rfl:     levothyroxine (SYNTHROID) 100 MCG tablet, TAKE 1 TABLET EVERY DAY, Disp: 90 tablet, Rfl: 1    magnesium 250 mg Tab, Take 400 mg by mouth every morning. 250mg QAM and 500mg QHS, Disp: , Rfl:     metoprolol succinate (TOPROL-XL) 50 MG 24  hr tablet, Take 1.5 tablets (75 mg total) by mouth once daily., Disp: 135 tablet, Rfl: 3    mupirocin (BACTROBAN) 2 % ointment, , Disp: , Rfl:     nebulizer and compressor Irene, 1 kit by Misc.(Non-Drug; Combo Route) route every 6 (six) hours as needed (wheezing SOB persistent cough)., Disp: 1 each, Rfl: 0    omega 3-dha-epa-fish oil (FISH OIL) 100-160-1,000 mg Cap, Take by mouth., Disp: , Rfl:     oxyquinoline-sod.lauryl sulfat (TRIMO-DUGNA JELLY) 0.025-0.01 % Gel, INSERT/APPLY 1 APPLICATORFUL VAGINALLY TWICE A WEEK AS DIRECTED, Disp: 1 each, Rfl: 2    pantoprazole (PROTONIX) 40 MG tablet, TAKE 1 TABLET EVERY DAY, Disp: 90 tablet, Rfl: 2    potassium citrate 99 mg Cap, Take 1 capsule by mouth., Disp: , Rfl:     rosuvastatin (CRESTOR) 40 MG Tab, TAKE 1 TABLET EVERY DAY, Disp: 90 tablet, Rfl: 2    TRULICITY 0.75 mg/0.5 mL pen injector, Inject 0.75 mg into the skin every 7 days., Disp: , Rfl:     PRE-PESSARY CHECK COUNSELING:  The patient was informed of the risks of pain or discomfort, continuous incontinence, urinary retention with insertion of a pessary and malodorous discharge and/or possible bleeding from granulation tissue after wearing the pessary for sometime. She was counseled on the alternatives to pessary insertion, including surgery or no treatment with continued symptoms, and she agrees to proceed.    PROCEDURE:  TIME OUT PERFORMED.  The pessary was removed and cleaned with soap and water.  There was no granulation tissue present. There is a mild area of erythema along the vaginal wall at the site of the pessary but no ulceration or granulation was noted.  There was a slight discharge present.  The pessary was re-inserted.  The patient was able to sit, stand, walk and either cough or urinate on the toilet after the procedure normally without expelling the pessary. The patient tolerated the procedure well.        ASSESSMENT:      1. Cystocele, midline    2. Prolapse of anterior vaginal wall    3. Stress  incontinence    4. Pessary maintenance        POST PESSARY CHECK COUNSELING:  Report worsening urinary incontinency, urinary retention, pain, fever, foul smelling discharge or bleeding.  Importance of keeping follow-up appointments for a pessary check stressed.    Counseling lasted approximately 10 minutes and all her questions were answered.    FOLLOW-UP: In 3 months for pessary check.      Karen Mota MD FACOG    11/01/2023  10:15 AM

## 2023-11-06 ENCOUNTER — HOSPITAL ENCOUNTER (OUTPATIENT)
Dept: RADIOLOGY | Facility: HOSPITAL | Age: 83
Discharge: HOME | End: 2023-11-06
Attending: OBSTETRICS & GYNECOLOGY
Payer: MEDICARE

## 2023-11-06 VITALS — HEIGHT: 59 IN | WEIGHT: 146 LBS | BODY MASS INDEX: 29.43 KG/M2

## 2023-11-06 DIAGNOSIS — Z12.31 SCREENING MAMMOGRAM, ENCOUNTER FOR: ICD-10-CM

## 2023-11-06 PROCEDURE — 77067 SCR MAMMO BI INCL CAD: CPT | Mod: TC

## 2023-11-13 DIAGNOSIS — R92.8 ABNORMAL MAMMOGRAM OF RIGHT BREAST: Primary | ICD-10-CM

## 2023-11-21 ENCOUNTER — HOSPITAL ENCOUNTER (OUTPATIENT)
Dept: RADIOLOGY | Facility: HOSPITAL | Age: 83
Discharge: HOME | End: 2023-11-21
Attending: OBSTETRICS & GYNECOLOGY
Payer: MEDICARE

## 2023-11-21 DIAGNOSIS — R92.8 ABNORMAL MAMMOGRAM OF RIGHT BREAST: ICD-10-CM

## 2023-11-21 PROCEDURE — 76642 ULTRASOUND BREAST LIMITED: CPT | Mod: TC,RT

## 2023-11-27 NOTE — PROGRESS NOTES
Please call patient regarding results.  Normal ultrasound. No change to lymph nodes seen before. Resume yearly screening

## 2023-12-01 PROBLEM — R42 DIZZINESS: Status: ACTIVE | Noted: 2023-12-01

## 2023-12-01 PROBLEM — U07.1 COVID-19: Status: RESOLVED | Noted: 2022-08-15 | Resolved: 2023-12-01

## 2023-12-04 ENCOUNTER — HOSPITAL ENCOUNTER (OUTPATIENT)
Dept: RADIOLOGY | Facility: HOSPITAL | Age: 83
Discharge: HOME | End: 2023-12-04
Attending: INTERNAL MEDICINE
Payer: MEDICARE

## 2023-12-04 DIAGNOSIS — R42 DIZZINESS AND GIDDINESS: ICD-10-CM

## 2023-12-04 PROCEDURE — 70551 MRI BRAIN STEM W/O DYE: CPT | Mod: TC

## 2023-12-07 ENCOUNTER — HOSPITAL ENCOUNTER (OUTPATIENT)
Dept: RADIOLOGY | Facility: HOSPITAL | Age: 83
Discharge: HOME | End: 2023-12-07
Attending: INTERNAL MEDICINE
Payer: MEDICARE

## 2023-12-07 DIAGNOSIS — R42 DIZZINESS AND GIDDINESS: ICD-10-CM

## 2023-12-07 PROCEDURE — 70544 MR ANGIOGRAPHY HEAD W/O DYE: CPT | Mod: TC

## 2023-12-08 NOTE — PROGRESS NOTES
Dr Rascon spoke w/ the patient to give her results on Fri. Can we have your input on whether this would need intervention? Chronic dizziness was her initial symptom. She is coming back to see us next Fri 12/15 and we will give her further recs then. BP well controlled. On statin therapy. Lipids well controlled.

## 2024-01-08 ENCOUNTER — LAB VISIT (OUTPATIENT)
Dept: LAB | Facility: HOSPITAL | Age: 84
End: 2024-01-08
Attending: INTERNAL MEDICINE
Payer: MEDICARE

## 2024-01-08 DIAGNOSIS — I65.23 BILATERAL CAROTID ARTERY STENOSIS: ICD-10-CM

## 2024-01-08 DIAGNOSIS — E11.69 TYPE 2 DIABETES MELLITUS WITH OTHER SPECIFIED COMPLICATION, WITHOUT LONG-TERM CURRENT USE OF INSULIN: ICD-10-CM

## 2024-01-08 DIAGNOSIS — R73.01 IFG (IMPAIRED FASTING GLUCOSE): ICD-10-CM

## 2024-01-08 DIAGNOSIS — E78.00 PURE HYPERCHOLESTEROLEMIA: ICD-10-CM

## 2024-01-08 DIAGNOSIS — I10 ESSENTIAL HYPERTENSION: ICD-10-CM

## 2024-01-08 DIAGNOSIS — E11.65 TYPE 2 DIABETES MELLITUS WITH HYPERGLYCEMIA, WITHOUT LONG-TERM CURRENT USE OF INSULIN: ICD-10-CM

## 2024-01-08 DIAGNOSIS — Z79.899 ENCOUNTER FOR LONG-TERM (CURRENT) USE OF OTHER MEDICATIONS: ICD-10-CM

## 2024-01-08 DIAGNOSIS — R79.82 ELEVATED C-REACTIVE PROTEIN (CRP): ICD-10-CM

## 2024-01-08 DIAGNOSIS — E78.2 MIXED HYPERLIPIDEMIA: ICD-10-CM

## 2024-01-08 LAB
ALBUMIN SERPL BCP-MCNC: 3.7 G/DL (ref 3.5–5.2)
ALBUMIN/CREAT UR: 225.1 UG/MG (ref 0–30)
ALP SERPL-CCNC: 48 U/L (ref 55–135)
ALT SERPL W/O P-5'-P-CCNC: 19 U/L (ref 10–44)
ANION GAP SERPL CALC-SCNC: 0 MMOL/L (ref 3–11)
AST SERPL-CCNC: 17 U/L (ref 10–40)
BASOPHILS # BLD AUTO: 0.07 K/UL (ref 0–0.2)
BASOPHILS NFR BLD: 0.9 % (ref 0–1.9)
BILIRUB SERPL-MCNC: 0.2 MG/DL (ref 0.1–1)
BUN SERPL-MCNC: 14 MG/DL (ref 8–23)
CALCIUM SERPL-MCNC: 9 MG/DL (ref 8.7–10.5)
CHLORIDE SERPL-SCNC: 107 MMOL/L (ref 95–110)
CHOLEST SERPL-MCNC: 128 MG/DL (ref 120–199)
CHOLEST/HDLC SERPL: 2.8 {RATIO} (ref 2–5)
CO2 SERPL-SCNC: 31 MMOL/L (ref 23–29)
CREAT SERPL-MCNC: 0.6 MG/DL (ref 0.5–1.4)
CREAT UR-MCNC: 47.1 MG/DL (ref 15–325)
DIFFERENTIAL METHOD BLD: NORMAL
EOSINOPHIL # BLD AUTO: 0.1 K/UL (ref 0–0.5)
EOSINOPHIL NFR BLD: 1.6 % (ref 0–8)
ERYTHROCYTE [DISTWIDTH] IN BLOOD BY AUTOMATED COUNT: 12.8 % (ref 11.5–14.5)
EST. GFR  (NO RACE VARIABLE): >60 ML/MIN/1.73 M^2
ESTIMATED AVG GLUCOSE: 137 MG/DL (ref 68–131)
GLUCOSE SERPL-MCNC: 133 MG/DL (ref 70–110)
HBA1C MFR BLD: 6.4 % (ref 4–5.6)
HCT VFR BLD AUTO: 40.2 % (ref 37–48.5)
HDLC SERPL-MCNC: 46 MG/DL (ref 40–75)
HDLC SERPL: 35.9 % (ref 20–50)
HGB BLD-MCNC: 13 G/DL (ref 12–16)
IMM GRANULOCYTES # BLD AUTO: 0.03 K/UL (ref 0–0.04)
IMM GRANULOCYTES NFR BLD AUTO: 0.4 % (ref 0–0.5)
LDLC SERPL CALC-MCNC: 44.8 MG/DL (ref 63–159)
LYMPHOCYTES # BLD AUTO: 2.6 K/UL (ref 1–4.8)
LYMPHOCYTES NFR BLD: 31.8 % (ref 18–48)
MAGNESIUM SERPL-MCNC: 2.4 MG/DL (ref 1.6–2.6)
MCH RBC QN AUTO: 30.5 PG (ref 27–31)
MCHC RBC AUTO-ENTMCNC: 32.3 G/DL (ref 32–36)
MCV RBC AUTO: 94 FL (ref 82–98)
MICROALBUMIN UR DL<=1MG/L-MCNC: 106 MG/L
MONOCYTES # BLD AUTO: 0.6 K/UL (ref 0.3–1)
MONOCYTES NFR BLD: 7.2 % (ref 4–15)
NEUTROPHILS # BLD AUTO: 4.7 K/UL (ref 1.8–7.7)
NEUTROPHILS NFR BLD: 58.1 % (ref 38–73)
NONHDLC SERPL-MCNC: 82 MG/DL
NRBC BLD-RTO: 0 /100 WBC
PLATELET # BLD AUTO: 245 K/UL (ref 150–450)
PMV BLD AUTO: 10.6 FL (ref 9.2–12.9)
POTASSIUM SERPL-SCNC: 4 MMOL/L (ref 3.5–5.1)
PROT SERPL-MCNC: 7.7 G/DL (ref 6–8.4)
RBC # BLD AUTO: 4.26 M/UL (ref 4–5.4)
SODIUM SERPL-SCNC: 138 MMOL/L (ref 136–145)
TRIGL SERPL-MCNC: 186 MG/DL (ref 30–150)
WBC # BLD AUTO: 8.03 K/UL (ref 3.9–12.7)

## 2024-01-08 PROCEDURE — 83735 ASSAY OF MAGNESIUM: CPT | Performed by: INTERNAL MEDICINE

## 2024-01-08 PROCEDURE — 85025 COMPLETE CBC W/AUTO DIFF WBC: CPT | Performed by: INTERNAL MEDICINE

## 2024-01-08 PROCEDURE — 36415 COLL VENOUS BLD VENIPUNCTURE: CPT | Performed by: INTERNAL MEDICINE

## 2024-01-08 PROCEDURE — 80053 COMPREHEN METABOLIC PANEL: CPT | Performed by: INTERNAL MEDICINE

## 2024-01-08 PROCEDURE — 82043 UR ALBUMIN QUANTITATIVE: CPT | Performed by: INTERNAL MEDICINE

## 2024-01-08 PROCEDURE — 80061 LIPID PANEL: CPT | Performed by: INTERNAL MEDICINE

## 2024-01-08 PROCEDURE — 83036 HEMOGLOBIN GLYCOSYLATED A1C: CPT | Performed by: INTERNAL MEDICINE

## 2024-02-02 ENCOUNTER — PROCEDURE VISIT (OUTPATIENT)
Dept: OBSTETRICS AND GYNECOLOGY | Facility: CLINIC | Age: 84
End: 2024-02-02
Payer: MEDICARE

## 2024-02-02 VITALS — BODY MASS INDEX: 30.09 KG/M2 | DIASTOLIC BLOOD PRESSURE: 74 MMHG | WEIGHT: 149 LBS | SYSTOLIC BLOOD PRESSURE: 152 MMHG

## 2024-02-02 DIAGNOSIS — Z46.89 PESSARY MAINTENANCE: Primary | ICD-10-CM

## 2024-02-02 DIAGNOSIS — N81.10 PROLAPSE OF ANTERIOR VAGINAL WALL: ICD-10-CM

## 2024-02-02 DIAGNOSIS — Z46.89 FITTING AND ADJUSTMENT OF PESSARY: ICD-10-CM

## 2024-02-02 DIAGNOSIS — N39.3 STRESS INCONTINENCE: ICD-10-CM

## 2024-02-02 DIAGNOSIS — N81.11 CYSTOCELE, MIDLINE: ICD-10-CM

## 2024-02-02 PROBLEM — J44.1 COPD EXACERBATION: Status: RESOLVED | Noted: 2022-11-03 | Resolved: 2024-02-02

## 2024-02-02 PROCEDURE — 99213 OFFICE O/P EST LOW 20 MIN: CPT | Mod: S$GLB,,, | Performed by: OBSTETRICS & GYNECOLOGY

## 2024-02-02 RX ORDER — OXYQUINOLINE SULFATE AND SODIUM LAURYL SULFATE .25; .1 MG/G; MG/G
JELLY VAGINAL
Qty: 1 EACH | Refills: 2 | Status: SHIPPED | OUTPATIENT
Start: 2024-02-02

## 2024-02-02 NOTE — PROCEDURES
Procedures  Subjective:    Patient ID: Shanthi Can is a 83 y.o. y.o. female.     Chief Complaint:   Chief Complaint   Patient presents with    Procedure       History of Present Illness:   Shanthi presents for a pessary check. She has been doing well wearing the pessary and has no complaints of bleeding or pain.    MEDICATION LIST    Current Outpatient Medications:     albuterol-ipratropium (DUO-NEB) 2.5 mg-0.5 mg/3 mL nebulizer solution, Take 3 mLs by nebulization every 6 (six) hours as needed for Wheezing. Rescue, Disp: 75 mL, Rfl: 0    ascorbic acid, vitamin C, (VITAMIN C) 500 MG tablet, Take 500 mg by mouth once daily., Disp: , Rfl:     aspirin (ECOTRIN) 81 MG EC tablet, Take 81 mg by mouth once daily. STOPPED 04/19/2022, Disp: , Rfl:     azelastine 0.15 % (205.5 mcg) [place] Star, , Disp: , Rfl:     budesonide (PULMICORT) 0.5 mg/2 mL nebulizer solution, Take 2 mLs (0.5 mg total) by nebulization 2 (two) times a day. Controller, Disp: 120 mL, Rfl: 0    calcium carbonate (OS-SARA) 600 mg calcium (1,500 mg) Tab, Take 600 mg by mouth., Disp: , Rfl:     coQ10, ubiquinol, 100 mg Cap, Take 200 mg by mouth every evening., Disp: 180 capsule, Rfl: 2    fluticasone propionate (XHANCE NASL), 1 spray by Nasal route 2 (two) times a day. Prescribed by ENT, Disp: , Rfl:     furosemide (LASIX) 20 MG tablet, Take 20 mg by mouth 2 (two) times daily., Disp: , Rfl:     glucosamine-chondroitin 500-400 mg tablet, Take 1 tablet by mouth 3 (three) times daily., Disp: , Rfl:     guaiFENesin (MUCINEX) 600 mg 12 hr tablet, Take 1,200 mg by mouth 2 (two) times daily., Disp: , Rfl:     levothyroxine (SYNTHROID) 100 MCG tablet, TAKE 1 TABLET EVERY DAY, Disp: 90 tablet, Rfl: 1    magnesium 250 mg Tab, Take 400 mg by mouth every morning. 250mg QAM and 500mg QHS, Disp: , Rfl:     metoprolol succinate (TOPROL-XL) 50 MG 24 hr tablet, Take 1.5 tablets (75 mg total) by mouth once daily., Disp: 135 tablet, Rfl: 3    mupirocin (BACTROBAN) 2 %  ointment, , Disp: , Rfl:     nebulizer and compressor Irene, 1 kit by Misc.(Non-Drug; Combo Route) route every 6 (six) hours as needed (wheezing SOB persistent cough)., Disp: 1 each, Rfl: 0    omega 3-dha-epa-fish oil (FISH OIL) 100-160-1,000 mg Cap, Take by mouth., Disp: , Rfl:     oxyquinoline-sod.lauryl sulfat (TRIMO-DUGAN JELLY) 0.025-0.01 % Gel, INSERT/APPLY 1 APPLICATORFUL VAGINALLY TWICE A WEEK AS DIRECTED, Disp: 1 each, Rfl: 2    pantoprazole (PROTONIX) 40 MG tablet, TAKE 1 TABLET EVERY DAY, Disp: 90 tablet, Rfl: 2    potassium citrate 99 mg Cap, Take 1 capsule by mouth., Disp: , Rfl:     rosuvastatin (CRESTOR) 40 MG Tab, TAKE 1 TABLET EVERY DAY, Disp: 90 tablet, Rfl: 2    TRULICITY 0.75 mg/0.5 mL pen injector, Inject 0.75 mg into the skin every 7 days., Disp: , Rfl:     PRE-PESSARY CHECK COUNSELING:  The patient was informed of the risks of pain or discomfort, continuous incontinence, urinary retention with insertion of a pessary and malodorous discharge and/or possible bleeding from granulation tissue after wearing the pessary for sometime. She was counseled on the alternatives to pessary insertion, including surgery or no treatment with continued symptoms, and she agrees to proceed.    PROCEDURE:  TIME OUT PERFORMED.  The pessary was removed and cleaned with soap and water.  There was no granulation tissue present. There is a mild area of erythema along the vaginal wall at the site of the pessary but no ulceration or granulation was noted.  There was a slight discharge present.  The pessary was re-inserted.  The patient was able to sit, stand, walk and either cough or urinate on the toilet after the procedure normally without expelling the pessary. The patient tolerated the procedure well.    TRIMOSAN vaginal cream prescribed       ASSESSMENT:      1. Pessary maintenance    2. Cystocele, midline    3. Prolapse of anterior vaginal wall    4. Stress incontinence    5. Fitting and adjustment of pessary         POST PESSARY CHECK COUNSELING:  Report worsening urinary incontinency, urinary retention, pain, fever, foul smelling discharge or bleeding.  Importance of keeping follow-up appointments for a pessary check stressed.    Counseling lasted approximately 10 minutes and all her questions were answered.    FOLLOW-UP: In 3 months for pessary check.      Karen Mota MD FACOG    02/02/2024  10:09 AM

## 2024-02-21 PROBLEM — I65.22 LEFT CAROTID STENOSIS: Status: ACTIVE | Noted: 2021-06-23

## 2024-02-21 PROBLEM — G45.9 TIA (TRANSIENT ISCHEMIC ATTACK): Status: ACTIVE | Noted: 2024-02-21

## 2024-04-05 PROBLEM — I50.21 REDUCED EJECTION FRACTION CONCURRENT WITH AND DUE TO ACUTE HEART FAILURE: Status: ACTIVE | Noted: 2024-04-05

## 2024-04-05 PROBLEM — K21.9 GASTROESOPHAGEAL REFLUX DISEASE: Status: ACTIVE | Noted: 2024-04-05

## 2024-05-02 ENCOUNTER — OFFICE VISIT (OUTPATIENT)
Dept: OBSTETRICS AND GYNECOLOGY | Facility: CLINIC | Age: 84
End: 2024-05-02
Payer: MEDICARE

## 2024-05-02 VITALS
WEIGHT: 143.81 LBS | HEART RATE: 72 BPM | BODY MASS INDEX: 28.99 KG/M2 | HEIGHT: 59 IN | DIASTOLIC BLOOD PRESSURE: 76 MMHG | SYSTOLIC BLOOD PRESSURE: 131 MMHG

## 2024-05-02 DIAGNOSIS — N39.3 STRESS INCONTINENCE: ICD-10-CM

## 2024-05-02 DIAGNOSIS — Z46.89 PESSARY MAINTENANCE: ICD-10-CM

## 2024-05-02 DIAGNOSIS — N81.10 PROLAPSE OF ANTERIOR VAGINAL WALL: ICD-10-CM

## 2024-05-02 DIAGNOSIS — N81.11 CYSTOCELE, MIDLINE: Primary | ICD-10-CM

## 2024-05-02 PROCEDURE — 3075F SYST BP GE 130 - 139MM HG: CPT | Mod: CPTII,S$GLB,, | Performed by: OBSTETRICS & GYNECOLOGY

## 2024-05-02 PROCEDURE — 99999 PR PBB SHADOW E&M-EST. PATIENT-LVL III: CPT | Mod: PBBFAC,,, | Performed by: OBSTETRICS & GYNECOLOGY

## 2024-05-02 PROCEDURE — 1160F RVW MEDS BY RX/DR IN RCRD: CPT | Mod: CPTII,S$GLB,, | Performed by: OBSTETRICS & GYNECOLOGY

## 2024-05-02 PROCEDURE — 3078F DIAST BP <80 MM HG: CPT | Mod: CPTII,S$GLB,, | Performed by: OBSTETRICS & GYNECOLOGY

## 2024-05-02 PROCEDURE — 99213 OFFICE O/P EST LOW 20 MIN: CPT | Mod: S$GLB,,, | Performed by: OBSTETRICS & GYNECOLOGY

## 2024-05-02 PROCEDURE — 1159F MED LIST DOCD IN RCRD: CPT | Mod: CPTII,S$GLB,, | Performed by: OBSTETRICS & GYNECOLOGY

## 2024-05-02 PROCEDURE — 1126F AMNT PAIN NOTED NONE PRSNT: CPT | Mod: CPTII,S$GLB,, | Performed by: OBSTETRICS & GYNECOLOGY

## 2024-05-02 NOTE — PROGRESS NOTES
Subjective:    Patient ID: Shanthi Can is a 83 y.o. y.o. female.     Chief Complaint:   Chief Complaint   Patient presents with    Pessary Check       History of Present Illness:   Shanthi presents for a pessary check. She has been doing well wearing the pessary and has no complaints of bleeding or pain.    MEDICATION LIST    Current Outpatient Medications:     ascorbic acid, vitamin C, (VITAMIN C) 500 MG tablet, Take 500 mg by mouth once daily., Disp: , Rfl:     aspirin (ECOTRIN) 81 MG EC tablet, Take 81 mg by mouth once daily. STOPPED 04/19/2022, Disp: , Rfl:     blood sugar diagnostic Strp, 1 each by Misc.(Non-Drug; Combo Route) route once daily., Disp: 100 each, Rfl: 1    blood-glucose meter (FREESTYLE SYSTEM KIT) kit, Use as instructed, Disp: 1 each, Rfl: 0    calcium carbonate (OS-SARA) 600 mg calcium (1,500 mg) Tab, Take 600 mg by mouth., Disp: , Rfl:     coQ10, ubiquinol, 100 mg Cap, Take 200 mg by mouth every evening., Disp: 180 capsule, Rfl: 2    fluticasone propionate (XHANCE NASL), 1 spray by Nasal route 2 (two) times a day. Prescribed by ENT, Disp: , Rfl:     furosemide (LASIX) 20 MG tablet, Take 20 mg by mouth 2 (two) times daily., Disp: , Rfl:     glucosamine-chondroitin 500-400 mg tablet, Take 1 tablet by mouth 3 (three) times daily., Disp: , Rfl:     lancets (ACCU-CHEK SOFTCLIX LANCETS) Misc, 1 each by Misc.(Non-Drug; Combo Route) route once daily., Disp: 100 each, Rfl: 1    levothyroxine (SYNTHROID) 100 MCG tablet, Take 1 tablet (100 mcg total) by mouth once daily., Disp: 90 tablet, Rfl: 1    linaGLIPtin (TRADJENTA) 5 mg Tab tablet, Take 1 tablet (5 mg total) by mouth once daily., Disp: 30 tablet, Rfl: 1    magnesium 250 mg Tab, Take 400 mg by mouth every morning. 250mg QAM and 500mg QHS, Disp: , Rfl:     metoprolol succinate (TOPROL-XL) 50 MG 24 hr tablet, Take 1.5 tablets (75 mg total) by mouth once daily., Disp: 135 tablet, Rfl: 3    omega 3-dha-epa-fish oil (FISH OIL) 100-160-1,000 mg  Cap, Take by mouth., Disp: , Rfl:     oxyquinoline-sod.lauryl sulfat (TRIMO-DUGAN JELLY) 0.025-0.01 % Gel, INSERT/APPLY 1 APPLICATORFUL VAGINALLY TWICE A WEEK AS DIRECTED, Disp: 1 each, Rfl: 2    pantoprazole (PROTONIX) 40 MG tablet, Take 1 tablet (40 mg total) by mouth once daily., Disp: 90 tablet, Rfl: 2    potassium citrate 99 mg Cap, Take 1 capsule by mouth., Disp: , Rfl:     rosuvastatin (CRESTOR) 40 MG Tab, TAKE 1 TABLET EVERY DAY, Disp: 90 tablet, Rfl: 2    PRE-PESSARY CHECK COUNSELING:  The patient was informed of the risks of pain or discomfort, continuous incontinence, urinary retention with insertion of a pessary and malodorous discharge and/or possible bleeding from granulation tissue after wearing the pessary for sometime. She was counseled on the alternatives to pessary insertion, including surgery or no treatment with continued symptoms, and she agrees to proceed.    PROCEDURE:  TIME OUT PERFORMED.  The pessary was removed and cleaned with soap and water.  There was no granulation tissue present. There is a mild area of erythema along the vaginal wall at the site of the pessary but no ulceration or granulation was noted.  There was a slight discharge present.  The pessary was re-inserted.  The patient was able to sit, stand, walk and either cough or urinate on the toilet after the procedure normally without expelling the pessary. The patient tolerated the procedure well.        ASSESSMENT:      1. Cystocele, midline    2. Prolapse of anterior vaginal wall    3. Stress incontinence    4. Pessary maintenance        POST PESSARY CHECK COUNSELING:  Report worsening urinary incontinency, urinary retention, pain, fever, foul smelling discharge or bleeding.  Importance of keeping follow-up appointments for a pessary check stressed.    Counseling lasted approximately 10 minutes and all her questions were answered.    FOLLOW-UP: In 3 months for pessary check.      Karen Mota MD FACOG    05/02/2024  10:56  AM

## 2024-05-03 ENCOUNTER — LAB VISIT (OUTPATIENT)
Dept: LAB | Facility: HOSPITAL | Age: 84
End: 2024-05-03
Attending: INTERNAL MEDICINE
Payer: MEDICARE

## 2024-05-03 DIAGNOSIS — Z79.899 ENCOUNTER FOR LONG-TERM (CURRENT) USE OF OTHER MEDICATIONS: ICD-10-CM

## 2024-05-03 DIAGNOSIS — I65.22 LEFT CAROTID STENOSIS: ICD-10-CM

## 2024-05-03 DIAGNOSIS — I10 ESSENTIAL HYPERTENSION: ICD-10-CM

## 2024-05-03 DIAGNOSIS — R73.01 IFG (IMPAIRED FASTING GLUCOSE): ICD-10-CM

## 2024-05-03 DIAGNOSIS — E03.8 OTHER SPECIFIED HYPOTHYROIDISM: ICD-10-CM

## 2024-05-03 DIAGNOSIS — I65.23 BILATERAL CAROTID ARTERY STENOSIS: ICD-10-CM

## 2024-05-03 DIAGNOSIS — R79.82 ELEVATED C-REACTIVE PROTEIN (CRP): ICD-10-CM

## 2024-05-03 DIAGNOSIS — I47.10 PSVT (PAROXYSMAL SUPRAVENTRICULAR TACHYCARDIA): ICD-10-CM

## 2024-05-03 DIAGNOSIS — E78.00 PURE HYPERCHOLESTEROLEMIA: ICD-10-CM

## 2024-05-03 DIAGNOSIS — E11.65 TYPE 2 DIABETES MELLITUS WITH HYPERGLYCEMIA, WITHOUT LONG-TERM CURRENT USE OF INSULIN: ICD-10-CM

## 2024-05-03 DIAGNOSIS — E66.9 OBESITY, UNSPECIFIED CLASSIFICATION, UNSPECIFIED OBESITY TYPE, UNSPECIFIED WHETHER SERIOUS COMORBIDITY PRESENT: ICD-10-CM

## 2024-05-03 DIAGNOSIS — E78.2 MIXED HYPERLIPIDEMIA: ICD-10-CM

## 2024-05-03 LAB
ALBUMIN SERPL BCP-MCNC: 3.8 G/DL (ref 3.5–5.2)
ALP SERPL-CCNC: 44 U/L (ref 55–135)
ALT SERPL W/O P-5'-P-CCNC: 17 U/L (ref 10–44)
ANION GAP SERPL CALC-SCNC: 4 MMOL/L (ref 3–11)
AST SERPL-CCNC: 20 U/L (ref 10–40)
BASOPHILS # BLD AUTO: 0.04 K/UL (ref 0–0.2)
BASOPHILS NFR BLD: 0.5 % (ref 0–1.9)
BILIRUB SERPL-MCNC: 0.4 MG/DL (ref 0.1–1)
BUN SERPL-MCNC: 15 MG/DL (ref 8–23)
CALCIUM SERPL-MCNC: 9.5 MG/DL (ref 8.7–10.5)
CHLORIDE SERPL-SCNC: 106 MMOL/L (ref 95–110)
CHOLEST SERPL-MCNC: 113 MG/DL (ref 120–199)
CHOLEST/HDLC SERPL: 2.6 {RATIO} (ref 2–5)
CO2 SERPL-SCNC: 30 MMOL/L (ref 23–29)
CREAT SERPL-MCNC: 0.6 MG/DL (ref 0.5–1.4)
DIFFERENTIAL METHOD BLD: NORMAL
EOSINOPHIL # BLD AUTO: 0.2 K/UL (ref 0–0.5)
EOSINOPHIL NFR BLD: 2.6 % (ref 0–8)
ERYTHROCYTE [DISTWIDTH] IN BLOOD BY AUTOMATED COUNT: 12.9 % (ref 11.5–14.5)
EST. GFR  (NO RACE VARIABLE): >60 ML/MIN/1.73 M^2
ESTIMATED AVG GLUCOSE: 131 MG/DL (ref 68–131)
GLUCOSE SERPL-MCNC: 126 MG/DL (ref 70–110)
HBA1C MFR BLD: 6.2 % (ref 4–5.6)
HCT VFR BLD AUTO: 40.5 % (ref 37–48.5)
HDLC SERPL-MCNC: 43 MG/DL (ref 40–75)
HDLC SERPL: 38.1 % (ref 20–50)
HGB BLD-MCNC: 13.1 G/DL (ref 12–16)
IMM GRANULOCYTES # BLD AUTO: 0.02 K/UL (ref 0–0.04)
IMM GRANULOCYTES NFR BLD AUTO: 0.3 % (ref 0–0.5)
LDLC SERPL CALC-MCNC: 44.6 MG/DL (ref 63–159)
LYMPHOCYTES # BLD AUTO: 2.5 K/UL (ref 1–4.8)
LYMPHOCYTES NFR BLD: 33.3 % (ref 18–48)
MAGNESIUM SERPL-MCNC: 2.4 MG/DL (ref 1.6–2.6)
MCH RBC QN AUTO: 29.6 PG (ref 27–31)
MCHC RBC AUTO-ENTMCNC: 32.3 G/DL (ref 32–36)
MCV RBC AUTO: 92 FL (ref 82–98)
MONOCYTES # BLD AUTO: 0.6 K/UL (ref 0.3–1)
MONOCYTES NFR BLD: 8.4 % (ref 4–15)
NEUTROPHILS # BLD AUTO: 4.1 K/UL (ref 1.8–7.7)
NEUTROPHILS NFR BLD: 54.9 % (ref 38–73)
NONHDLC SERPL-MCNC: 70 MG/DL
NRBC BLD-RTO: 0 /100 WBC
PLATELET # BLD AUTO: 229 K/UL (ref 150–450)
PMV BLD AUTO: 10.3 FL (ref 9.2–12.9)
POTASSIUM SERPL-SCNC: 4.4 MMOL/L (ref 3.5–5.1)
PROT SERPL-MCNC: 7.6 G/DL (ref 6–8.4)
RBC # BLD AUTO: 4.42 M/UL (ref 4–5.4)
SODIUM SERPL-SCNC: 140 MMOL/L (ref 136–145)
TRIGL SERPL-MCNC: 127 MG/DL (ref 30–150)
WBC # BLD AUTO: 7.41 K/UL (ref 3.9–12.7)

## 2024-05-03 PROCEDURE — 36415 COLL VENOUS BLD VENIPUNCTURE: CPT | Performed by: INTERNAL MEDICINE

## 2024-05-03 PROCEDURE — 85025 COMPLETE CBC W/AUTO DIFF WBC: CPT | Performed by: INTERNAL MEDICINE

## 2024-05-03 PROCEDURE — 83735 ASSAY OF MAGNESIUM: CPT | Performed by: INTERNAL MEDICINE

## 2024-05-03 PROCEDURE — 80061 LIPID PANEL: CPT | Performed by: INTERNAL MEDICINE

## 2024-05-03 PROCEDURE — 83036 HEMOGLOBIN GLYCOSYLATED A1C: CPT | Performed by: INTERNAL MEDICINE

## 2024-05-03 PROCEDURE — 80053 COMPREHEN METABOLIC PANEL: CPT | Performed by: INTERNAL MEDICINE

## 2024-05-27 PROBLEM — G45.9 TIA (TRANSIENT ISCHEMIC ATTACK): Status: RESOLVED | Noted: 2024-02-21 | Resolved: 2024-05-27

## 2024-06-12 PROBLEM — Z00.00 ROUTINE GENERAL MEDICAL EXAMINATION AT A HEALTH CARE FACILITY: Status: ACTIVE | Noted: 2024-06-12

## 2024-08-02 ENCOUNTER — OFFICE VISIT (OUTPATIENT)
Dept: OBSTETRICS AND GYNECOLOGY | Facility: CLINIC | Age: 84
End: 2024-08-02
Payer: MEDICARE

## 2024-08-02 VITALS
HEIGHT: 59 IN | WEIGHT: 141 LBS | DIASTOLIC BLOOD PRESSURE: 65 MMHG | HEART RATE: 71 BPM | BODY MASS INDEX: 28.43 KG/M2 | SYSTOLIC BLOOD PRESSURE: 140 MMHG

## 2024-08-02 DIAGNOSIS — N39.3 STRESS INCONTINENCE: ICD-10-CM

## 2024-08-02 DIAGNOSIS — Z46.89 PESSARY MAINTENANCE: Primary | ICD-10-CM

## 2024-08-02 DIAGNOSIS — N81.10 PROLAPSE OF ANTERIOR VAGINAL WALL: ICD-10-CM

## 2024-08-02 DIAGNOSIS — N81.11 CYSTOCELE, MIDLINE: ICD-10-CM

## 2024-08-02 PROCEDURE — 99999 PR PBB SHADOW E&M-EST. PATIENT-LVL III: CPT | Mod: PBBFAC,,, | Performed by: OBSTETRICS & GYNECOLOGY

## 2024-08-02 NOTE — PROGRESS NOTES
Subjective:    Patient ID: Shanthi Can is a 83 y.o. y.o. female.     Chief Complaint:   Chief Complaint   Patient presents with    Pessary Check       History of Present Illness:   Shanthi presents for a pessary check. She has been doing well wearing the pessary and has no complaints of bleeding or pain.    MEDICATION LIST    Current Outpatient Medications:     ascorbic acid, vitamin C, (VITAMIN C) 500 MG tablet, Take 500 mg by mouth once daily., Disp: , Rfl:     aspirin (ECOTRIN) 81 MG EC tablet, Take 81 mg by mouth once daily. STOPPED 04/19/2022, Disp: , Rfl:     blood sugar diagnostic Strp, 1 each by Misc.(Non-Drug; Combo Route) route once daily., Disp: 100 each, Rfl: 1    blood-glucose meter (FREESTYLE SYSTEM KIT) kit, Use as instructed, Disp: 1 each, Rfl: 0    calcium carbonate (OS-SARA) 600 mg calcium (1,500 mg) Tab, Take 600 mg by mouth., Disp: , Rfl:     coQ10, ubiquinol, 100 mg Cap, Take 200 mg by mouth every evening., Disp: 180 capsule, Rfl: 2    FARXIGA 5 mg Tab tablet, Take 1 tablet by mouth once daily, Disp: 30 tablet, Rfl: 5    fluticasone propionate (XHANCE NASL), 1 spray by Nasal route 2 (two) times a day. Prescribed by ENT, Disp: , Rfl:     furosemide (LASIX) 20 MG tablet, Take 20 mg by mouth 2 (two) times daily., Disp: , Rfl:     glucosamine-chondroitin 500-400 mg tablet, Take 1 tablet by mouth 3 (three) times daily., Disp: , Rfl:     lancets (ACCU-CHEK SOFTCLIX LANCETS) Misc, 1 each by Misc.(Non-Drug; Combo Route) route once daily., Disp: 100 each, Rfl: 1    levothyroxine (SYNTHROID) 100 MCG tablet, Take 1 tablet by mouth once daily, Disp: 90 tablet, Rfl: 0    magnesium 250 mg Tab, Take 400 mg by mouth every morning. 250mg QAM and 500mg QHS, Disp: , Rfl:     metoprolol succinate (TOPROL-XL) 50 MG 24 hr tablet, Take 1.5 tablets (75 mg total) by mouth once daily., Disp: 135 tablet, Rfl: 3    omega 3-dha-epa-fish oil (FISH OIL) 100-160-1,000 mg Cap, Take by mouth., Disp: , Rfl:      oxyquinoline-sod.lauryl sulfat (TRIMO-DUGAN JELLY) 0.025-0.01 % Gel, INSERT/APPLY 1 APPLICATORFUL VAGINALLY TWICE A WEEK AS DIRECTED, Disp: 1 each, Rfl: 2    pantoprazole (PROTONIX) 40 MG tablet, Take 1 tablet (40 mg total) by mouth once daily., Disp: 90 tablet, Rfl: 2    potassium citrate 99 mg Cap, Take 1 capsule by mouth., Disp: , Rfl:     rosuvastatin (CRESTOR) 40 MG Tab, Take 1 tablet (40 mg total) by mouth once daily., Disp: 90 tablet, Rfl: 1    PRE-PESSARY CHECK COUNSELING:  The patient was informed of the risks of pain or discomfort, continuous incontinence, urinary retention with insertion of a pessary and malodorous discharge and/or possible bleeding from granulation tissue after wearing the pessary for sometime. She was counseled on the alternatives to pessary insertion, including surgery or no treatment with continued symptoms, and she agrees to proceed.    PROCEDURE:  TIME OUT PERFORMED.  The pessary was removed and cleaned with soap and water.  There was no granulation tissue present. There is a mild area of erythema along the vaginal wall at the site of the pessary but no ulceration or granulation was noted.  There was a slight discharge present.  The pessary was re-inserted.  The patient was able to sit, stand, walk and either cough or urinate on the toilet after the procedure normally without expelling the pessary. The patient tolerated the procedure well.        ASSESSMENT:      1. Pessary maintenance    2. Cystocele, midline    3. Prolapse of anterior vaginal wall    4. Stress incontinence        POST PESSARY CHECK COUNSELING:  Report worsening urinary incontinency, urinary retention, pain, fever, foul smelling discharge or bleeding.  Importance of keeping follow-up appointments for a pessary check stressed.    Counseling lasted approximately 10 minutes and all her questions were answered.    FOLLOW-UP: In 3 months for pessary check.      Karen Mota MD [facility] [place]    08/02/2024  10:49 AM

## 2024-08-09 DIAGNOSIS — N81.11 CYSTOCELE, MIDLINE: ICD-10-CM

## 2024-08-09 DIAGNOSIS — N81.10 PROLAPSE OF ANTERIOR VAGINAL WALL: ICD-10-CM

## 2024-08-09 DIAGNOSIS — N39.3 STRESS INCONTINENCE: ICD-10-CM

## 2024-08-09 DIAGNOSIS — Z46.89 FITTING AND ADJUSTMENT OF PESSARY: ICD-10-CM

## 2024-08-12 RX ORDER — OXYQUINOLINE SULFATE AND SODIUM LAURYL SULFATE .25; .1 MG/G; MG/G
JELLY VAGINAL
Qty: 114 G | Refills: 3 | Status: SHIPPED | OUTPATIENT
Start: 2024-08-12

## 2024-09-16 PROBLEM — Z00.00 ROUTINE GENERAL MEDICAL EXAMINATION AT A HEALTH CARE FACILITY: Status: RESOLVED | Noted: 2024-06-12 | Resolved: 2024-09-16

## 2024-10-01 ENCOUNTER — OFFICE VISIT (OUTPATIENT)
Dept: OBSTETRICS AND GYNECOLOGY | Facility: CLINIC | Age: 84
End: 2024-10-01
Payer: MEDICARE

## 2024-10-01 VITALS
SYSTOLIC BLOOD PRESSURE: 143 MMHG | HEIGHT: 59 IN | DIASTOLIC BLOOD PRESSURE: 64 MMHG | WEIGHT: 136 LBS | HEART RATE: 70 BPM | BODY MASS INDEX: 27.42 KG/M2

## 2024-10-01 DIAGNOSIS — N81.11 CYSTOCELE, MIDLINE: ICD-10-CM

## 2024-10-01 DIAGNOSIS — Z46.89 PESSARY MAINTENANCE: ICD-10-CM

## 2024-10-01 DIAGNOSIS — R10.2 PELVIC PRESSURE IN FEMALE: Primary | ICD-10-CM

## 2024-10-01 DIAGNOSIS — N39.3 STRESS INCONTINENCE: ICD-10-CM

## 2024-10-01 DIAGNOSIS — N81.10 PROLAPSE OF ANTERIOR VAGINAL WALL: ICD-10-CM

## 2024-10-01 PROCEDURE — 1160F RVW MEDS BY RX/DR IN RCRD: CPT | Mod: CPTII,S$GLB,, | Performed by: OBSTETRICS & GYNECOLOGY

## 2024-10-01 PROCEDURE — 3077F SYST BP >= 140 MM HG: CPT | Mod: CPTII,S$GLB,, | Performed by: OBSTETRICS & GYNECOLOGY

## 2024-10-01 PROCEDURE — 1125F AMNT PAIN NOTED PAIN PRSNT: CPT | Mod: CPTII,S$GLB,, | Performed by: OBSTETRICS & GYNECOLOGY

## 2024-10-01 PROCEDURE — 99999 PR PBB SHADOW E&M-EST. PATIENT-LVL III: CPT | Mod: PBBFAC,,, | Performed by: OBSTETRICS & GYNECOLOGY

## 2024-10-01 PROCEDURE — 1159F MED LIST DOCD IN RCRD: CPT | Mod: CPTII,S$GLB,, | Performed by: OBSTETRICS & GYNECOLOGY

## 2024-10-01 PROCEDURE — 99213 OFFICE O/P EST LOW 20 MIN: CPT | Mod: S$GLB,,, | Performed by: OBSTETRICS & GYNECOLOGY

## 2024-10-01 PROCEDURE — 3078F DIAST BP <80 MM HG: CPT | Mod: CPTII,S$GLB,, | Performed by: OBSTETRICS & GYNECOLOGY

## 2024-10-01 NOTE — PROGRESS NOTES
Subjective:    Patient ID: Shanthi Can is a 83 y.o. y.o. female    Chief Complaint:   Chief Complaint   Patient presents with    Pessary Check     Patient states that approx 10 days ago she started to notice some vaginal pressure/pain as well as some rectal pressure. She says that it is worse when walking and standing and when she initially sits down.        History of Present Illness:  Shanthi presents today for evaluation of pelvic discomfort.  Patient has a pessary in place and usually does very well with.  She states about 10 days ago she started to notice more vaginal pressure and pain as well as some rectal pressure.  She states she is still having normal bowel movements and that the pain is worse with walking and standing.  She is using her trimo-Shetty cream every 3 days      Review of Systems   Constitutional:  Negative for chills and fever.   Respiratory:  Negative for shortness of breath.    Cardiovascular:  Negative for chest pain.   Gastrointestinal:  Negative for constipation.   Genitourinary:  Positive for pelvic pain.        Pressure-vaginal   Neurological:  Negative for headaches.         Objective:    Vital Signs:  Vitals:    10/01/24 0951   BP: (!) 143/64   Pulse: 70     Wt Readings from Last 1 Encounters:   10/01/24 61.7 kg (136 lb)     Body mass index is 27.47 kg/m².    Physical Exam:  General:  alert, no distress   Pelvis: External genitalia: normal general appearance  Pessary removed with the assistance ring forceps.  The pessary was cleaned and the vaginal canal inspected.  No erosions noted.  Vaginal exam performed and pessary seems to be the correct size for patient's vaginal canal.  Pessary was reinserted and knob adjusted to sit under urethra.  Patient tolerated well     Discussed that she may have just needed some readjustment to the pessary.  Will continue to monitor and if she continues to have more pressure she may need a different size or different style such as a Gellhorn.   All questions answered    I spent a total of 20 minutes on the day of the visit.  This includes face to face time and non-face to face time preparing to see the patient (eg, review of tests), obtaining and/or reviewing separately obtained history, documenting clinical information in the electronic or other health record, independently interpreting results and communicating results to the patient/family/caregiver, or care coordinator.           Assessment:      1. Pelvic pressure in female    2. Cystocele, midline    3. Prolapse of anterior vaginal wall    4. Stress incontinence    5. Pessary maintenance          Plan:      Pelvic pressure in female    Cystocele, midline    Prolapse of anterior vaginal wall    Stress incontinence    Pessary maintenance           Karen Mota MD, FACOG   10/01/2024 9:57 AM

## 2024-10-28 PROBLEM — R80.9 MICROALBUMINURIA: Status: ACTIVE | Noted: 2024-10-28

## 2024-11-12 ENCOUNTER — OFFICE VISIT (OUTPATIENT)
Dept: OBSTETRICS AND GYNECOLOGY | Facility: CLINIC | Age: 84
End: 2024-11-12
Payer: MEDICARE

## 2024-11-12 VITALS — HEIGHT: 59 IN | WEIGHT: 137 LBS | BODY MASS INDEX: 27.62 KG/M2

## 2024-11-12 DIAGNOSIS — N39.3 STRESS INCONTINENCE: ICD-10-CM

## 2024-11-12 DIAGNOSIS — R10.2 PELVIC PRESSURE IN FEMALE: ICD-10-CM

## 2024-11-12 DIAGNOSIS — T83.89XA VAGINAL EROSION SECONDARY TO PESSARY USE, INITIAL ENCOUNTER: ICD-10-CM

## 2024-11-12 DIAGNOSIS — N81.11 CYSTOCELE, MIDLINE: Primary | ICD-10-CM

## 2024-11-12 DIAGNOSIS — Z46.89 PESSARY MAINTENANCE: ICD-10-CM

## 2024-11-12 DIAGNOSIS — N89.8 VAGINAL EROSION SECONDARY TO PESSARY USE, INITIAL ENCOUNTER: ICD-10-CM

## 2024-11-12 DIAGNOSIS — N81.10 PROLAPSE OF ANTERIOR VAGINAL WALL: ICD-10-CM

## 2024-11-12 DIAGNOSIS — L92.9 GRANULATION TISSUE: ICD-10-CM

## 2024-11-12 PROCEDURE — 1160F RVW MEDS BY RX/DR IN RCRD: CPT | Mod: CPTII,S$GLB,, | Performed by: OBSTETRICS & GYNECOLOGY

## 2024-11-12 PROCEDURE — 1159F MED LIST DOCD IN RCRD: CPT | Mod: CPTII,S$GLB,, | Performed by: OBSTETRICS & GYNECOLOGY

## 2024-11-12 PROCEDURE — 99999 PR PBB SHADOW E&M-EST. PATIENT-LVL III: CPT | Mod: PBBFAC,,, | Performed by: OBSTETRICS & GYNECOLOGY

## 2024-11-12 PROCEDURE — 99213 OFFICE O/P EST LOW 20 MIN: CPT | Mod: S$GLB,,, | Performed by: OBSTETRICS & GYNECOLOGY

## 2024-11-12 NOTE — PROGRESS NOTES
Subjective:    Patient ID: Shanthi Can is a 84 y.o. y.o. female    Chief Complaint:   Chief Complaint   Patient presents with    Follow-up     Reports pain and discomfort with pessary.        History of Present Illness:  Shanthi presents today for evaluation of pain with pessary.  Patient states that if she is on her feet for a long period of time or after she takes a decent length walk she will have some pain.  She also complains of some pink discharge      Review of Systems   Constitutional:  Negative for chills and fever.   Respiratory:  Negative for shortness of breath.    Cardiovascular:  Negative for chest pain.   Gastrointestinal:  Negative for constipation.   Genitourinary:  Positive for vaginal bleeding (pink), vaginal discharge and vaginal pain.   Neurological:  Negative for headaches.         Objective:    Vital Signs:  There were no vitals filed for this visit.  Wt Readings from Last 1 Encounters:   11/12/24 62.1 kg (137 lb)     Body mass index is 27.67 kg/m².    Physical Exam:  General:  alert, no distress   Pelvis: External genitalia: normal general appearance  Urinary system: urethral meatus normal, bladder nontender  Vaginal:  Pessary removed and brown/pink discharge noted.  Speculum exam performed and granulation tissue and erosion noted at left side of vaginal cuff.  Small erosion also noted on anterior portion of vaginal wall.  These areas were cauterized with silver nitrate.  Pessary remains removed  Cervix: normal appearance  Uterus: normal single, nontender       Discussed the need to allow vaginal canal to rest and heal.  Will follow-up in 1 week and hopefully replace pessary at that time.  All questions answered    I spent a total of 20 minutes on the day of the visit.  This includes face to face time and non-face to face time preparing to see the patient (eg, review of tests), obtaining and/or reviewing separately obtained history, documenting clinical information in the electronic  or other health record, independently interpreting results and communicating results to the patient/family/caregiver, or care coordinator.           Assessment:      1. Cystocele, midline    2. Prolapse of anterior vaginal wall    3. Stress incontinence    4. Pessary maintenance    5. Pelvic pressure in female    6. Granulation tissue    7. Vaginal erosion secondary to pessary use, initial encounter          Plan:      Cystocele, midline    Prolapse of anterior vaginal wall    Stress incontinence    Pessary maintenance    Pelvic pressure in female    Granulation tissue    Vaginal erosion secondary to pessary use, initial encounter           Karen Mota MD, FACOG   11/12/2024 9:59 AM

## 2024-11-20 ENCOUNTER — OFFICE VISIT (OUTPATIENT)
Dept: OBSTETRICS AND GYNECOLOGY | Facility: CLINIC | Age: 84
End: 2024-11-20
Payer: MEDICARE

## 2024-11-20 VITALS
BODY MASS INDEX: 27.82 KG/M2 | HEIGHT: 59 IN | SYSTOLIC BLOOD PRESSURE: 132 MMHG | DIASTOLIC BLOOD PRESSURE: 68 MMHG | WEIGHT: 138 LBS

## 2024-11-20 DIAGNOSIS — B37.31 VAGINAL YEAST INFECTION: Primary | ICD-10-CM

## 2024-11-20 PROCEDURE — 99999 PR PBB SHADOW E&M-EST. PATIENT-LVL III: CPT | Mod: PBBFAC,,, | Performed by: OBSTETRICS & GYNECOLOGY

## 2024-11-20 PROCEDURE — 99213 OFFICE O/P EST LOW 20 MIN: CPT | Mod: S$GLB,,, | Performed by: OBSTETRICS & GYNECOLOGY

## 2024-11-20 PROCEDURE — 3075F SYST BP GE 130 - 139MM HG: CPT | Mod: CPTII,S$GLB,, | Performed by: OBSTETRICS & GYNECOLOGY

## 2024-11-20 PROCEDURE — 1159F MED LIST DOCD IN RCRD: CPT | Mod: CPTII,S$GLB,, | Performed by: OBSTETRICS & GYNECOLOGY

## 2024-11-20 PROCEDURE — 1160F RVW MEDS BY RX/DR IN RCRD: CPT | Mod: CPTII,S$GLB,, | Performed by: OBSTETRICS & GYNECOLOGY

## 2024-11-20 PROCEDURE — 0352U VAGINOSIS SCREEN BY DNA PROBE: CPT | Performed by: OBSTETRICS & GYNECOLOGY

## 2024-11-20 PROCEDURE — 3078F DIAST BP <80 MM HG: CPT | Mod: CPTII,S$GLB,, | Performed by: OBSTETRICS & GYNECOLOGY

## 2024-11-20 PROCEDURE — 1126F AMNT PAIN NOTED NONE PRSNT: CPT | Mod: CPTII,S$GLB,, | Performed by: OBSTETRICS & GYNECOLOGY

## 2024-11-20 RX ORDER — TERCONAZOLE 4 MG/G
1 CREAM VAGINAL NIGHTLY
Qty: 45 G | Refills: 0 | Status: SHIPPED | OUTPATIENT
Start: 2024-11-20

## 2024-11-20 RX ORDER — FLUCONAZOLE 100 MG/1
100 TABLET ORAL DAILY
Qty: 3 TABLET | Refills: 0 | Status: SHIPPED | OUTPATIENT
Start: 2024-11-20 | End: 2024-11-23

## 2024-11-20 NOTE — PROGRESS NOTES
Subjective:    Patient ID: Shanthi Can is a 84 y.o. y.o. female    Chief Complaint:   Chief Complaint   Patient presents with    Pessary Check     Patient present to have her pessary placed after taking a break for approx a week. She states that her pain has gone away. She states that she does have a yeast infection now. Monistat has been used at home. She is on day three of vaginal suppositories and still c/o itching and pain to the vaginal area.        History of Present Illness:  Shanthi presents today for follow up of vaginal erosion from pessary.  She states that pain is gone and she has no further discharge or vaginal bleeding.  She does report that she has itching and most likely a yeast infection.  She has been using Monistat for 3 days and has had no relief      Review of Systems   Constitutional:  Negative for chills and fever.   Respiratory:  Negative for shortness of breath.    Cardiovascular:  Negative for chest pain.   Gastrointestinal:  Negative for constipation.   Genitourinary:  Positive for vaginal discharge.   Neurological:  Negative for headaches.         Objective:    Vital Signs:  Vitals:    11/20/24 1008   BP: 132/68     Wt Readings from Last 1 Encounters:   11/20/24 62.6 kg (138 lb)     Body mass index is 27.87 kg/m².    Physical Exam:  General:  alert, no distress   Pelvis: External genitalia: normal general appearance  Urinary system: urethral meatus normal, bladder nontender  Vaginal: discharge, white, granulation tissue has decreased significantly  Cervix: normal appearance       Will treat for yeast while awaiting test results.  Recommend keeping pessary out while treating infection, which will allow more time for healing.  All questions answered    I spent a total of 20 minutes on the day of the visit.  This includes face to face time and non-face to face time preparing to see the patient (eg, review of tests), obtaining and/or reviewing separately obtained history, documenting  clinical information in the electronic or other health record, independently interpreting results and communicating results to the patient/family/caregiver, or care coordinator.           Assessment:      1. Vaginal yeast infection          Plan:      Vaginal yeast infection  -     Vaginosis Screen by DNA Probe  -     terconazole (TERAZOL 7) 0.4 % Crea; Place 1 applicator vaginally every evening.  Dispense: 45 g; Refill: 0  -     fluconazole (DIFLUCAN) 100 MG tablet; Take 1 tablet (100 mg total) by mouth once daily. for 3 days  Dispense: 3 tablet; Refill: 0           Karen Mota MD, FACOG   11/20/2024 10:33 AM

## 2024-11-22 ENCOUNTER — HOSPITAL ENCOUNTER (OUTPATIENT)
Dept: RADIOLOGY | Facility: HOSPITAL | Age: 84
Discharge: HOME | End: 2024-11-22
Attending: INTERNAL MEDICINE
Payer: MEDICARE

## 2024-11-22 DIAGNOSIS — Z13.820 SCREENING FOR OSTEOPOROSIS: ICD-10-CM

## 2024-11-22 DIAGNOSIS — M81.0 AGE-RELATED OSTEOPOROSIS WITHOUT CURRENT PATHOLOGICAL FRACTURE: ICD-10-CM

## 2024-11-22 LAB
BACTERIAL VAGINOSIS DNA: NOT DETECTED
CANDIDA GLABRATA/KRUSEI: NOT DETECTED
CANDIDA RRNA VAG QL PROBE: NOT DETECTED
TRICHOMONAS VAGINALIS: NOT DETECTED

## 2024-11-22 PROCEDURE — 77080 DXA BONE DENSITY AXIAL: CPT | Mod: TC

## 2024-11-22 NOTE — PROGRESS NOTES
Please let her know, DEXA scan shows osteopenia.    She is already taking calcium supplement.  Can add vitamin D 5000 units daily plus weight bearing exercise or can start full treatment with fosamax if she feels like adding weight bearing exercise is not possible?     Thanks

## 2024-12-03 ENCOUNTER — OFFICE VISIT (OUTPATIENT)
Dept: OBSTETRICS AND GYNECOLOGY | Facility: CLINIC | Age: 84
End: 2024-12-03
Payer: MEDICARE

## 2024-12-03 VITALS
HEART RATE: 62 BPM | DIASTOLIC BLOOD PRESSURE: 75 MMHG | WEIGHT: 135 LBS | BODY MASS INDEX: 27.21 KG/M2 | SYSTOLIC BLOOD PRESSURE: 122 MMHG | HEIGHT: 59 IN

## 2024-12-03 DIAGNOSIS — N39.3 STRESS INCONTINENCE: ICD-10-CM

## 2024-12-03 DIAGNOSIS — N81.11 CYSTOCELE, MIDLINE: Primary | ICD-10-CM

## 2024-12-03 DIAGNOSIS — Z46.89 PESSARY MAINTENANCE: ICD-10-CM

## 2024-12-03 DIAGNOSIS — N81.10 PROLAPSE OF ANTERIOR VAGINAL WALL: ICD-10-CM

## 2024-12-03 PROCEDURE — 99213 OFFICE O/P EST LOW 20 MIN: CPT | Mod: S$GLB,,, | Performed by: OBSTETRICS & GYNECOLOGY

## 2024-12-03 PROCEDURE — 1126F AMNT PAIN NOTED NONE PRSNT: CPT | Mod: CPTII,S$GLB,, | Performed by: OBSTETRICS & GYNECOLOGY

## 2024-12-03 PROCEDURE — 99999 PR PBB SHADOW E&M-EST. PATIENT-LVL IV: CPT | Mod: PBBFAC,,, | Performed by: OBSTETRICS & GYNECOLOGY

## 2024-12-03 PROCEDURE — 3074F SYST BP LT 130 MM HG: CPT | Mod: CPTII,S$GLB,, | Performed by: OBSTETRICS & GYNECOLOGY

## 2024-12-03 PROCEDURE — 3078F DIAST BP <80 MM HG: CPT | Mod: CPTII,S$GLB,, | Performed by: OBSTETRICS & GYNECOLOGY

## 2024-12-03 PROCEDURE — 1159F MED LIST DOCD IN RCRD: CPT | Mod: CPTII,S$GLB,, | Performed by: OBSTETRICS & GYNECOLOGY

## 2024-12-03 PROCEDURE — 1160F RVW MEDS BY RX/DR IN RCRD: CPT | Mod: CPTII,S$GLB,, | Performed by: OBSTETRICS & GYNECOLOGY

## 2024-12-03 NOTE — PROGRESS NOTES
Subjective:    Patient ID: Shanthi Can is a 84 y.o. y.o. female.     Chief Complaint:   Chief Complaint   Patient presents with    Pessary Check     Re-insert pessary after taking a break due to bleeding and yeast infection.        History of Present Illness:   Shanthi presents for pessary insertion.  She had taken a break as she had some bleeding and a significant yeast infection.  Patient states she feels as if everything is healed up and she is ready to have her pessary reinserted    MEDICATION LIST    Current Outpatient Medications:     ascorbic acid, vitamin C, (VITAMIN C) 500 MG tablet, Take 500 mg by mouth once daily., Disp: , Rfl:     aspirin (ECOTRIN) 81 MG EC tablet, Take 81 mg by mouth once daily. STOPPED 04/19/2022, Disp: , Rfl:     blood sugar diagnostic Strp, 1 each by Misc.(Non-Drug; Combo Route) route once daily., Disp: 100 each, Rfl: 1    blood-glucose meter (FREESTYLE SYSTEM KIT) kit, Use as instructed, Disp: 1 each, Rfl: 0    calcium carbonate (OS-SARA) 600 mg calcium (1,500 mg) Tab, Take 600 mg by mouth., Disp: , Rfl:     coQ10, ubiquinol, 100 mg Cap, Take 200 mg by mouth every evening., Disp: 180 capsule, Rfl: 2    FARXIGA 5 mg Tab tablet, Take 1 tablet by mouth once daily, Disp: 30 tablet, Rfl: 5    fluticasone propionate (XHANCE NASL), 1 spray by Nasal route 2 (two) times a day. Prescribed by ENT, Disp: , Rfl:     furosemide (LASIX) 20 MG tablet, Take 20 mg by mouth 2 (two) times daily., Disp: , Rfl:     glucosamine-chondroitin 500-400 mg tablet, Take 1 tablet by mouth 3 (three) times daily., Disp: , Rfl:     lancets (ACCU-CHEK SOFTCLIX LANCETS) Misc, 1 each by Misc.(Non-Drug; Combo Route) route once daily., Disp: 100 each, Rfl: 1    levothyroxine (SYNTHROID) 88 MCG tablet, Take 1 tablet (88 mcg total) by mouth before breakfast., Disp: 30 tablet, Rfl: 5    magnesium 250 mg Tab, Take 400 mg by mouth every morning. 250mg QAM and 500mg QHS, Disp: , Rfl:     metoprolol succinate  (TOPROL-XL) 50 MG 24 hr tablet, Take 1.5 tablets (75 mg total) by mouth once daily., Disp: 135 tablet, Rfl: 3    omega 3-dha-epa-fish oil (FISH OIL) 100-160-1,000 mg Cap, Take by mouth., Disp: , Rfl:     oxyquinoline-sod.lauryl sulfat (TRIMO-DUGAN JELLY) 0.025-0.01 % Gel, INSERT 1 APPLICATORFUL IN VAGINA TWICE A WEEK AS DIRECTED, Disp: 114 g, Rfl: 3    pantoprazole (PROTONIX) 40 MG tablet, Take 1 tablet (40 mg total) by mouth once daily., Disp: 90 tablet, Rfl: 2    potassium citrate 99 mg Cap, Take 1 capsule by mouth., Disp: , Rfl:     rosuvastatin (CRESTOR) 40 MG Tab, Take 1 tablet by mouth once daily, Disp: 90 tablet, Rfl: 1    terconazole (TERAZOL 7) 0.4 % Crea, Place 1 applicator vaginally every evening., Disp: 45 g, Rfl: 0    PRE-PESSARY CHECK COUNSELING:  The patient was informed of the risks of pain or discomfort, continuous incontinence, urinary retention with insertion of a pessary and malodorous discharge and/or possible bleeding from granulation tissue after wearing the pessary for sometime. She was counseled on the alternatives to pessary insertion, including surgery or no treatment with continued symptoms, and she agrees to proceed.    PROCEDURE:  TIME OUT PERFORMED.  The pessary was located and washed with soap and water  Speculum was placed into the vaginal canal and was inspected. There was no granulation tissue present. There was a slight discharge present.  The pessary was re-inserted.  The patient was able to sit, stand, walk and either cough or urinate on the toilet after the procedure normally without expelling the pessary. The patient tolerated the procedure well.      ASSESSMENT:      1. Cystocele, midline    2. Prolapse of anterior vaginal wall    3. Stress incontinence    4. Pessary maintenance        POST PESSARY CHECK COUNSELING:  Report worsening urinary incontinency, urinary retention, pain, fever, foul smelling discharge or bleeding.  Importance of keeping follow-up appointments for a  pessary check stressed.    Counseling lasted approximately 10 minutes and all her questions were answered.    FOLLOW-UP: In 3 months for pessary check.      Karen Mota MD FACOG    12/03/2024  11:23 AM

## 2025-01-08 ENCOUNTER — OFFICE VISIT (OUTPATIENT)
Dept: OBSTETRICS AND GYNECOLOGY | Facility: CLINIC | Age: 85
End: 2025-01-08
Payer: MEDICARE

## 2025-01-08 VITALS
HEART RATE: 66 BPM | DIASTOLIC BLOOD PRESSURE: 60 MMHG | HEIGHT: 59 IN | SYSTOLIC BLOOD PRESSURE: 128 MMHG | BODY MASS INDEX: 27.21 KG/M2 | WEIGHT: 135 LBS

## 2025-01-08 DIAGNOSIS — N95.2 VAGINAL ATROPHY: ICD-10-CM

## 2025-01-08 DIAGNOSIS — N81.11 CYSTOCELE, MIDLINE: Primary | ICD-10-CM

## 2025-01-08 DIAGNOSIS — N39.3 STRESS INCONTINENCE: ICD-10-CM

## 2025-01-08 DIAGNOSIS — Z46.89 PESSARY MAINTENANCE: ICD-10-CM

## 2025-01-08 DIAGNOSIS — N81.10 PROLAPSE OF ANTERIOR VAGINAL WALL: ICD-10-CM

## 2025-01-08 PROCEDURE — 3074F SYST BP LT 130 MM HG: CPT | Mod: CPTII,S$GLB,, | Performed by: OBSTETRICS & GYNECOLOGY

## 2025-01-08 PROCEDURE — 1126F AMNT PAIN NOTED NONE PRSNT: CPT | Mod: CPTII,S$GLB,, | Performed by: OBSTETRICS & GYNECOLOGY

## 2025-01-08 PROCEDURE — 3078F DIAST BP <80 MM HG: CPT | Mod: CPTII,S$GLB,, | Performed by: OBSTETRICS & GYNECOLOGY

## 2025-01-08 PROCEDURE — 1159F MED LIST DOCD IN RCRD: CPT | Mod: CPTII,S$GLB,, | Performed by: OBSTETRICS & GYNECOLOGY

## 2025-01-08 PROCEDURE — 1160F RVW MEDS BY RX/DR IN RCRD: CPT | Mod: CPTII,S$GLB,, | Performed by: OBSTETRICS & GYNECOLOGY

## 2025-01-08 PROCEDURE — 99999 PR PBB SHADOW E&M-EST. PATIENT-LVL III: CPT | Mod: PBBFAC,,, | Performed by: OBSTETRICS & GYNECOLOGY

## 2025-01-08 PROCEDURE — 99213 OFFICE O/P EST LOW 20 MIN: CPT | Mod: S$GLB,,, | Performed by: OBSTETRICS & GYNECOLOGY

## 2025-01-08 RX ORDER — ESTRADIOL 0.1 MG/G
1 CREAM VAGINAL
Qty: 42.5 G | Refills: 1 | Status: SHIPPED | OUTPATIENT
Start: 2025-01-09 | End: 2026-01-09

## 2025-01-08 NOTE — PROGRESS NOTES
Subjective:    Patient ID: Shanthi Can is a 84 y.o. y.o. female.     Chief Complaint:   Chief Complaint   Patient presents with    Pessary Check     Patient states that she is noticing some pain from her pessary and some slight amounts of blood when she wipes/on her pad.        History of Present Illness:   Shanthi presents for a pessary check. She has been doing well wearing the pessary, but she reports that she has been noticing some pain when she is on her feet for a long time.  She also has noted of few spots of blood    MEDICATION LIST    Current Outpatient Medications:     ascorbic acid, vitamin C, (VITAMIN C) 500 MG tablet, Take 500 mg by mouth once daily., Disp: , Rfl:     aspirin (ECOTRIN) 81 MG EC tablet, Take 81 mg by mouth once daily. STOPPED 04/19/2022, Disp: , Rfl:     blood sugar diagnostic Strp, 1 each by Misc.(Non-Drug; Combo Route) route once daily., Disp: 100 each, Rfl: 1    blood-glucose meter (FREESTYLE SYSTEM KIT) kit, Use as instructed, Disp: 1 each, Rfl: 0    calcium carbonate (OS-SAAR) 600 mg calcium (1,500 mg) Tab, Take 600 mg by mouth., Disp: , Rfl:     coQ10, ubiquinol, 100 mg Cap, Take 200 mg by mouth every evening., Disp: 180 capsule, Rfl: 2    FARXIGA 5 mg Tab tablet, Take 1 tablet by mouth once daily, Disp: 30 tablet, Rfl: 5    fluticasone propionate (XHANCE NASL), 1 spray by Nasal route 2 (two) times a day. Prescribed by ENT, Disp: , Rfl:     glucosamine-chondroitin 500-400 mg tablet, Take 1 tablet by mouth 3 (three) times daily., Disp: , Rfl:     lancets (ACCU-CHEK SOFTCLIX LANCETS) Misc, 1 each by Misc.(Non-Drug; Combo Route) route once daily., Disp: 100 each, Rfl: 1    levothyroxine (SYNTHROID) 88 MCG tablet, Take 1 tablet (88 mcg total) by mouth before breakfast., Disp: 30 tablet, Rfl: 5    magnesium 250 mg Tab, Take 400 mg by mouth every morning. 250mg QAM and 500mg QHS, Disp: , Rfl:     metoprolol succinate (TOPROL-XL) 50 MG 24 hr tablet, Take 1.5 tablets (75 mg total)  by mouth once daily., Disp: 135 tablet, Rfl: 3    omega 3-dha-epa-fish oil (FISH OIL) 100-160-1,000 mg Cap, Take by mouth., Disp: , Rfl:     oxyquinoline-sod.lauryl sulfat (TRIMO-DUGAN JELLY) 0.025-0.01 % Gel, INSERT 1 APPLICATORFUL IN VAGINA TWICE A WEEK AS DIRECTED, Disp: 114 g, Rfl: 3    pantoprazole (PROTONIX) 40 MG tablet, Take 1 tablet (40 mg total) by mouth once daily., Disp: 90 tablet, Rfl: 2    potassium citrate 99 mg Cap, Take 1 capsule by mouth., Disp: , Rfl:     rosuvastatin (CRESTOR) 40 MG Tab, Take 1 tablet by mouth once daily, Disp: 90 tablet, Rfl: 1    [START ON 1/9/2025] estradioL (ESTRACE) 0.01 % (0.1 mg/gram) vaginal cream, Place 1 g vaginally twice a week., Disp: 42.5 g, Rfl: 1    furosemide (LASIX) 20 MG tablet, Take 20 mg by mouth 2 (two) times daily. (Patient not taking: Reported on 1/8/2025), Disp: , Rfl:     terconazole (TERAZOL 7) 0.4 % Crea, Place 1 applicator vaginally every evening. (Patient not taking: Reported on 1/8/2025), Disp: 45 g, Rfl: 0    PRE-PESSARY CHECK COUNSELING:  The patient was informed of the risks of pain or discomfort, continuous incontinence, urinary retention with insertion of a pessary and malodorous discharge and/or possible bleeding from granulation tissue after wearing the pessary for sometime. She was counseled on the alternatives to pessary insertion, including surgery or no treatment with continued symptoms, and she agrees to proceed.    PROCEDURE:  TIME OUT PERFORMED.  The pessary was removed and cleaned with soap and water.  There was one are of granulation tissue present. There is a mild area of erythema along the vaginal wall at the site of the pessary but no ulceration noted.  There was a slight discharge present.  The pessary was re-inserted.  The patient was able to sit, stand, walk and either cough or urinate on the toilet after the procedure normally without expelling the pessary. The patient tolerated the procedure well.    TRIMOSAN vaginal cream  prescribed   ESTRACE vaginal cream prescribed      ASSESSMENT:      1. Cystocele, midline    2. Prolapse of anterior vaginal wall    3. Stress incontinence    4. Pessary maintenance    5. Vaginal atrophy        POST PESSARY CHECK COUNSELING:  Report worsening urinary incontinency, urinary retention, pain, fever, foul smelling discharge or bleeding.  Importance of keeping follow-up appointments for a pessary check stressed.    Counseling lasted approximately 10 minutes and all her questions were answered.    FOLLOW-UP: In 3 months for pessary check.      Karen Mota MD FACOG    01/08/2025  10:47 AM

## 2025-02-25 PROBLEM — E11.59 HYPERTENSION ASSOCIATED WITH TYPE 2 DIABETES MELLITUS: Status: ACTIVE | Noted: 2025-02-25

## 2025-02-25 PROBLEM — I11.0 HYPERTENSIVE HEART DISEASE WITH HEART FAILURE: Status: ACTIVE | Noted: 2025-02-25

## 2025-02-25 PROBLEM — I15.2 HYPERTENSION ASSOCIATED WITH TYPE 2 DIABETES MELLITUS: Status: ACTIVE | Noted: 2025-02-25

## 2025-02-28 ENCOUNTER — OFFICE VISIT (OUTPATIENT)
Dept: OBSTETRICS AND GYNECOLOGY | Facility: CLINIC | Age: 85
End: 2025-02-28
Payer: MEDICARE

## 2025-02-28 VITALS
BODY MASS INDEX: 27.06 KG/M2 | DIASTOLIC BLOOD PRESSURE: 59 MMHG | WEIGHT: 134 LBS | SYSTOLIC BLOOD PRESSURE: 113 MMHG | HEART RATE: 64 BPM

## 2025-02-28 DIAGNOSIS — N39.3 STRESS INCONTINENCE: ICD-10-CM

## 2025-02-28 DIAGNOSIS — Z46.89 PESSARY MAINTENANCE: ICD-10-CM

## 2025-02-28 DIAGNOSIS — R10.2 PELVIC PRESSURE IN FEMALE: ICD-10-CM

## 2025-02-28 DIAGNOSIS — N81.10 PROLAPSE OF ANTERIOR VAGINAL WALL: ICD-10-CM

## 2025-02-28 DIAGNOSIS — R30.0 DYSURIA: Primary | ICD-10-CM

## 2025-02-28 DIAGNOSIS — N95.2 VAGINAL ATROPHY: ICD-10-CM

## 2025-02-28 DIAGNOSIS — N81.11 CYSTOCELE, MIDLINE: ICD-10-CM

## 2025-02-28 LAB
BILIRUB SERPL-MCNC: NEGATIVE MG/DL
BLOOD URINE, POC: NORMAL
CLARITY, POC UA: NORMAL
COLOR, POC UA: NORMAL
GLUCOSE UR QL STRIP: NORMAL
KETONES UR QL STRIP: NEGATIVE
LEUKOCYTE ESTERASE URINE, POC: NORMAL
NITRITE, POC UA: NEGATIVE
PH, POC UA: 6
PROTEIN, POC: NEGATIVE
SPECIFIC GRAVITY, POC UA: 1.01
UROBILINOGEN, POC UA: NORMAL

## 2025-02-28 PROCEDURE — 1125F AMNT PAIN NOTED PAIN PRSNT: CPT | Mod: CPTII,S$GLB,, | Performed by: OBSTETRICS & GYNECOLOGY

## 2025-02-28 PROCEDURE — 3078F DIAST BP <80 MM HG: CPT | Mod: CPTII,S$GLB,, | Performed by: OBSTETRICS & GYNECOLOGY

## 2025-02-28 PROCEDURE — 99999 PR PBB SHADOW E&M-EST. PATIENT-LVL III: CPT | Mod: PBBFAC,,, | Performed by: OBSTETRICS & GYNECOLOGY

## 2025-02-28 PROCEDURE — 81002 URINALYSIS NONAUTO W/O SCOPE: CPT | Mod: S$GLB,,, | Performed by: OBSTETRICS & GYNECOLOGY

## 2025-02-28 PROCEDURE — 99213 OFFICE O/P EST LOW 20 MIN: CPT | Mod: S$GLB,,, | Performed by: OBSTETRICS & GYNECOLOGY

## 2025-02-28 PROCEDURE — 87086 URINE CULTURE/COLONY COUNT: CPT | Performed by: OBSTETRICS & GYNECOLOGY

## 2025-02-28 PROCEDURE — 3074F SYST BP LT 130 MM HG: CPT | Mod: CPTII,S$GLB,, | Performed by: OBSTETRICS & GYNECOLOGY

## 2025-02-28 RX ORDER — NITROFURANTOIN 25; 75 MG/1; MG/1
100 CAPSULE ORAL 2 TIMES DAILY
Qty: 14 CAPSULE | Refills: 0 | Status: SHIPPED | OUTPATIENT
Start: 2025-02-28 | End: 2025-03-07

## 2025-02-28 NOTE — PROGRESS NOTES
"Subjective:    Patient ID: Shanthi Can is a 84 y.o. y.o. female    Chief Complaint:   Chief Complaint   Patient presents with    burning with urination     Patient states that she has been experiencing burning with urination, pressure in her vagina, and bleeding when urinating. Symptoms have lessened and bleeding has stopped around yesterday late afternoon.        History of Present Illness:  Shanthi presents today for evaluation of ***      Review of Systems      Objective:    Vital Signs:  Vitals:    25 0825   BP: (!) 113/59   Pulse: 64     Wt Readings from Last 1 Encounters:   25 60.8 kg (134 lb)     Body mass index is 27.06 kg/m².    Physical Exam:  General:  {Exam; general:260046161:x:"alert,normal appearing gravid female"}   Skin:  {Findings; skin exam-one line:47303:x:"Skin color, texture, turgor normal. No rashes or lesions"}   Neck: {EXAM; NECK:07561:x:supple, trachea midline, no adenopathy or thyromegaly}   Respiratory:  {Exam; lun:x:"clear to auscultation bilaterally"}   Heart:  {Exam; heart:5510:x:"regular rate and rhythm, S1, S2 normal, no murmur, click, rub or gallop"}   Abdomen:  {EXAM; ABDOMEN:39054:x:"Soft, non-tender. Bowel sounds normal. No masses,  no organomegaly"}   Pelvis: External genitalia: {ext. genitalia exam:41102:x:"normal general appearance"}  Urinary system: {gu exam:23841:x:"urethral meatus normal, bladder nontender"}  Vaginal: {vaginal exam:50531:x:"normal mucosa without prolapse or lesions"}  Cervix: {cervix exam:41288:x:"normal appearance"}  Uterus: {uterus exam:93012:x:"normal single, nontender"}  Adnexa: {pelvic adnexa exam:94540:x:"normal bimanual exam"}       Urine dipstick shows positive for leukocytes, red blood cells.         Assessment:      1. Dysuria    2. Cystocele, midline    3. Prolapse of anterior vaginal wall    4. Stress incontinence    5. Pessary maintenance    6. Vaginal atrophy    7. Pelvic pressure in female          Plan:  "     Dysuria  -     nitrofurantoin, macrocrystal-monohydrate, (MACROBID) 100 MG capsule; Take 1 capsule (100 mg total) by mouth 2 (two) times daily. for 7 days  Dispense: 14 capsule; Refill: 0  -     POCT urine dipstick without microscope  -     Urine Culture High Risk    Cystocele, midline    Prolapse of anterior vaginal wall    Stress incontinence    Pessary maintenance    Vaginal atrophy    Pelvic pressure in female  -     POCT urine dipstick without microscope  -     Urine Culture High Risk           Karen Mota MD, FACOG   02/28/2025 8:35 AM

## 2025-03-02 LAB
BACTERIA UR CULT: NORMAL
BACTERIA UR CULT: NORMAL

## 2025-03-06 ENCOUNTER — HOSPITAL ENCOUNTER (OUTPATIENT)
Dept: RADIOLOGY | Facility: HOSPITAL | Age: 85
Discharge: HOME | End: 2025-03-06
Attending: NURSE PRACTITIONER
Payer: MEDICARE

## 2025-03-06 DIAGNOSIS — R06.02 SOB (SHORTNESS OF BREATH): ICD-10-CM

## 2025-03-06 PROCEDURE — 71046 X-RAY EXAM CHEST 2 VIEWS: CPT | Mod: TC

## 2025-03-13 ENCOUNTER — RESULTS FOLLOW-UP (OUTPATIENT)
Dept: HEPATOLOGY | Facility: HOSPITAL | Age: 85
End: 2025-03-13

## 2025-03-13 ENCOUNTER — HOSPITAL ENCOUNTER (OUTPATIENT)
Dept: RADIOLOGY | Facility: HOSPITAL | Age: 85
Discharge: HOME | End: 2025-03-13
Attending: INTERNAL MEDICINE
Payer: MEDICARE

## 2025-03-13 DIAGNOSIS — R91.1 SOLITARY PULMONARY NODULE: ICD-10-CM

## 2025-03-13 PROCEDURE — 71250 CT THORAX DX C-: CPT | Mod: TC

## 2025-05-30 ENCOUNTER — OFFICE VISIT (OUTPATIENT)
Dept: OBSTETRICS AND GYNECOLOGY | Facility: CLINIC | Age: 85
End: 2025-05-30
Payer: MEDICARE

## 2025-05-30 VITALS — BODY MASS INDEX: 26.61 KG/M2 | WEIGHT: 132 LBS | HEIGHT: 59 IN

## 2025-05-30 DIAGNOSIS — Z46.89 PESSARY MAINTENANCE: ICD-10-CM

## 2025-05-30 DIAGNOSIS — L92.9 GRANULATION TISSUE: ICD-10-CM

## 2025-05-30 DIAGNOSIS — T83.89XD VAGINAL EROSION SECONDARY TO PESSARY USE, SUBSEQUENT ENCOUNTER: ICD-10-CM

## 2025-05-30 DIAGNOSIS — N89.8 VAGINAL EROSION SECONDARY TO PESSARY USE, SUBSEQUENT ENCOUNTER: ICD-10-CM

## 2025-05-30 DIAGNOSIS — N39.3 STRESS INCONTINENCE: ICD-10-CM

## 2025-05-30 DIAGNOSIS — N81.11 CYSTOCELE, MIDLINE: Primary | ICD-10-CM

## 2025-05-30 DIAGNOSIS — N95.2 VAGINAL ATROPHY: ICD-10-CM

## 2025-05-30 DIAGNOSIS — N81.10 PROLAPSE OF ANTERIOR VAGINAL WALL: ICD-10-CM

## 2025-05-30 PROCEDURE — 1160F RVW MEDS BY RX/DR IN RCRD: CPT | Mod: CPTII,S$GLB,, | Performed by: OBSTETRICS & GYNECOLOGY

## 2025-05-30 PROCEDURE — 99999 PR PBB SHADOW E&M-EST. PATIENT-LVL V: CPT | Mod: PBBFAC,,, | Performed by: OBSTETRICS & GYNECOLOGY

## 2025-05-30 PROCEDURE — 99213 OFFICE O/P EST LOW 20 MIN: CPT | Mod: S$GLB,,, | Performed by: OBSTETRICS & GYNECOLOGY

## 2025-05-30 PROCEDURE — 1159F MED LIST DOCD IN RCRD: CPT | Mod: CPTII,S$GLB,, | Performed by: OBSTETRICS & GYNECOLOGY

## 2025-05-30 NOTE — PROGRESS NOTES
Subjective:    Patient ID: Shanthi Can is a 84 y.o. y.o. female    Chief Complaint:   Chief Complaint   Patient presents with    Pessary Check     States she has some pain on L side. Would like to discuss surgery possibly.        History of Present Illness:  Shanthi presents today for follow up of pessary.  Patient reports that she is having some pain on the left side.  She feels she is beginning to have more trouble with the pessary in recent weeks and is contemplating surgery.  Discussed surgery and she would need a TLH/BSO with retropubic sling and cystocele repair.  She currently has a pessary that has an incontinence knob that helps with leakage to some extent.    Not exactly sure what day she wants to do her surgery but knows she wants to have it done       Review of Systems   Constitutional:  Negative for chills and fever.   Respiratory:  Negative for shortness of breath.    Cardiovascular:  Negative for chest pain.   Gastrointestinal:  Negative for constipation.   Genitourinary:  Positive for pelvic pain.   Neurological:  Negative for headaches.         Objective:    Vital Signs:  There were no vitals filed for this visit.  Wt Readings from Last 1 Encounters:   05/30/25 59.9 kg (132 lb)     Body mass index is 26.66 kg/m².    Physical Exam:  General:  alert, no distress   Pelvis: External genitalia: normal general appearance  Urinary system: urethral meatus normal, bladder nontender  Vaginal: Mild erosion noted on left side of vaginal canal-cauterized with silver nitrate.  Atrophic mucosa with grade 2-3 cystocele noted  Cervix: normal appearance  Uterus: normal single, nontender  Adnexa: normal bimanual exam       Options discussed and she would like to proceed with surgery.  She is not exactly sure what day she would like to have it performed.  We will schedule surgery and patient will need medical clearance from Cardiology.  We will pinpoint surgery day based on her schedule and clearance or  completion of risk assessment.  All questions answered    I spent a total of 20 minutes on the day of the visit.  This includes face to face time and non-face to face time preparing to see the patient (eg, review of tests), obtaining and/or reviewing separately obtained history, documenting clinical information in the electronic or other health record, independently interpreting results and communicating results to the patient/family/caregiver, or care coordinator.         Assessment:      1. Cystocele, midline    2. Prolapse of anterior vaginal wall    3. Pessary maintenance    4. Stress incontinence    5. Vaginal atrophy    6. Granulation tissue    7. Vaginal erosion secondary to pessary use, subsequent encounter          Plan:      Cystocele, midline  -     Case Request Operating Room: HYSTERECTOMY,TOTAL,LAPAROSCOPIC,WITH SALPINGO-OOPHORECTOMY, INSERTION, PUBOVAGINAL SLING, WITH CYSTOSCOPY, REPAIR, CYSTOCELE    Prolapse of anterior vaginal wall  -     Case Request Operating Room: HYSTERECTOMY,TOTAL,LAPAROSCOPIC,WITH SALPINGO-OOPHORECTOMY, INSERTION, PUBOVAGINAL SLING, WITH CYSTOSCOPY, REPAIR, CYSTOCELE    Pessary maintenance    Stress incontinence  -     Case Request Operating Room: HYSTERECTOMY,TOTAL,LAPAROSCOPIC,WITH SALPINGO-OOPHORECTOMY, INSERTION, PUBOVAGINAL SLING, WITH CYSTOSCOPY, REPAIR, CYSTOCELE    Vaginal atrophy    Granulation tissue    Vaginal erosion secondary to pessary use, subsequent encounter           Karen Mota MD, FACOG   05/30/2025 11:02 AM

## 2025-06-12 ENCOUNTER — OFFICE VISIT (OUTPATIENT)
Dept: OBSTETRICS AND GYNECOLOGY | Facility: CLINIC | Age: 85
End: 2025-06-12
Payer: MEDICARE

## 2025-06-12 VITALS — BODY MASS INDEX: 26.61 KG/M2 | WEIGHT: 132 LBS | HEIGHT: 59 IN

## 2025-06-12 DIAGNOSIS — N39.3 STRESS INCONTINENCE: ICD-10-CM

## 2025-06-12 DIAGNOSIS — N81.10 PROLAPSE OF ANTERIOR VAGINAL WALL: ICD-10-CM

## 2025-06-12 DIAGNOSIS — L92.9 GRANULATION TISSUE: ICD-10-CM

## 2025-06-12 DIAGNOSIS — B37.31 VAGINAL YEAST INFECTION: ICD-10-CM

## 2025-06-12 DIAGNOSIS — N81.11 CYSTOCELE, MIDLINE: Primary | ICD-10-CM

## 2025-06-12 DIAGNOSIS — N95.2 VAGINAL ATROPHY: ICD-10-CM

## 2025-06-12 PROCEDURE — 99213 OFFICE O/P EST LOW 20 MIN: CPT | Mod: S$GLB,,, | Performed by: OBSTETRICS & GYNECOLOGY

## 2025-06-12 PROCEDURE — 1160F RVW MEDS BY RX/DR IN RCRD: CPT | Mod: CPTII,S$GLB,, | Performed by: OBSTETRICS & GYNECOLOGY

## 2025-06-12 PROCEDURE — 99999 PR PBB SHADOW E&M-EST. PATIENT-LVL III: CPT | Mod: PBBFAC,,, | Performed by: OBSTETRICS & GYNECOLOGY

## 2025-06-12 PROCEDURE — 1159F MED LIST DOCD IN RCRD: CPT | Mod: CPTII,S$GLB,, | Performed by: OBSTETRICS & GYNECOLOGY

## 2025-06-12 RX ORDER — FLUCONAZOLE 100 MG/1
100 TABLET ORAL DAILY
Qty: 5 TABLET | Refills: 0 | Status: SHIPPED | OUTPATIENT
Start: 2025-06-12 | End: 2025-06-17

## 2025-06-12 NOTE — PROGRESS NOTES
Subjective:    Patient ID: Shanthi Can is a 84 y.o. y.o. female    Chief Complaint:   Chief Complaint   Patient presents with    Follow-up     Pessary fu        History of Present Illness:  Shanthi presents today for follow up of vaginal erosion/irritation from pessary.  Patient states that she has had much less discharge than previous weeks.  She does note some yeast.  No further bleeding or pain noted.  She is anxious for surgery and does not want pessary reinserted at this time    States she has not appointment for an MRI tomorrow as she has been having some dizziness      Review of Systems   Constitutional:  Negative for chills and fever.   Respiratory:  Negative for shortness of breath.    Cardiovascular:  Negative for chest pain.   Gastrointestinal:  Negative for constipation.   Genitourinary:  Positive for vaginal discharge. Negative for pelvic pain, vaginal bleeding, vaginal pain and vaginal odor.   Neurological:  Negative for headaches.         Objective:    Vital Signs:  There were no vitals filed for this visit.  Wt Readings from Last 1 Encounters:   06/12/25 59.9 kg (132 lb)     Body mass index is 26.66 kg/m².    Physical Exam:  General:  alert, no distress   Pelvis: External genitalia: normal general appearance; erythema associated with yeast  Urinary system: urethral meatus normal, bladder nontender  Vaginal: atrophic mucosa, area of granulation tissue and erosion has healed; discharge noted consistent with yeast  Cervix: normal appearance  Uterus: normal single, nontender  Adnexa: normal bimanual exam       Explained that area of irritation and granulation tissue has resolved.  She does not want her pessary reinserted at this time.  Surgery is scheduled for July 10th, but she may want to move it.  She is following up with cardiology for her preoperative risk assessment.    I spent a total of 20 minutes on the day of the visit.  This includes face to face time and non-face to face time  preparing to see the patient (eg, review of tests), obtaining and/or reviewing separately obtained history, documenting clinical information in the electronic or other health record, independently interpreting results and communicating results to the patient/family/caregiver, or care coordinator.         Assessment:      1. Cystocele, midline    2. Prolapse of anterior vaginal wall    3. Stress incontinence    4. Vaginal atrophy    5. Granulation tissue    6. Vaginal yeast infection          Plan:      Cystocele, midline    Prolapse of anterior vaginal wall    Stress incontinence    Vaginal atrophy    Granulation tissue    Vaginal yeast infection  -     fluconazole (DIFLUCAN) 100 MG tablet; Take 1 tablet (100 mg total) by mouth once daily. for 5 days  Dispense: 5 tablet; Refill: 0    RTC for preop       Karen Mota MD, FACOG   06/12/2025 10:46 AM

## 2025-06-17 PROBLEM — J01.00 ACUTE NON-RECURRENT MAXILLARY SINUSITIS: Status: ACTIVE | Noted: 2025-06-17

## 2025-07-07 ENCOUNTER — TELEPHONE (OUTPATIENT)
Dept: OBSTETRICS AND GYNECOLOGY | Facility: CLINIC | Age: 85
End: 2025-07-07
Payer: MEDICARE

## 2025-07-07 RX ORDER — FLUCONAZOLE 100 MG/1
100 TABLET ORAL DAILY
Qty: 5 TABLET | Refills: 0 | Status: SHIPPED | OUTPATIENT
Start: 2025-07-07 | End: 2025-07-12

## 2025-07-07 NOTE — TELEPHONE ENCOUNTER
Pt states that she is experiencing yeast infection symptoms after a recent round of antibiotics. Can we please call in meds to [facility]

## 2025-08-22 ENCOUNTER — OFFICE VISIT (OUTPATIENT)
Dept: OBSTETRICS AND GYNECOLOGY | Facility: CLINIC | Age: 85
End: 2025-08-22
Payer: MEDICARE

## 2025-08-22 VITALS
BODY MASS INDEX: 26.61 KG/M2 | HEIGHT: 59 IN | SYSTOLIC BLOOD PRESSURE: 123 MMHG | HEART RATE: 59 BPM | DIASTOLIC BLOOD PRESSURE: 64 MMHG | WEIGHT: 132 LBS

## 2025-08-22 DIAGNOSIS — R10.2 PELVIC PRESSURE IN FEMALE: ICD-10-CM

## 2025-08-22 DIAGNOSIS — N39.3 STRESS INCONTINENCE: ICD-10-CM

## 2025-08-22 DIAGNOSIS — N95.2 VAGINAL ATROPHY: ICD-10-CM

## 2025-08-22 DIAGNOSIS — N81.11 CYSTOCELE, MIDLINE: Primary | ICD-10-CM

## 2025-08-22 DIAGNOSIS — N81.10 PROLAPSE OF ANTERIOR VAGINAL WALL: ICD-10-CM

## 2025-08-22 PROCEDURE — 1160F RVW MEDS BY RX/DR IN RCRD: CPT | Mod: CPTII,S$GLB,, | Performed by: OBSTETRICS & GYNECOLOGY

## 2025-08-22 PROCEDURE — 99999 PR PBB SHADOW E&M-EST. PATIENT-LVL IV: CPT | Mod: PBBFAC,,, | Performed by: OBSTETRICS & GYNECOLOGY

## 2025-08-22 PROCEDURE — 99213 OFFICE O/P EST LOW 20 MIN: CPT | Mod: S$GLB,,, | Performed by: OBSTETRICS & GYNECOLOGY

## 2025-08-22 PROCEDURE — 3078F DIAST BP <80 MM HG: CPT | Mod: CPTII,S$GLB,, | Performed by: OBSTETRICS & GYNECOLOGY

## 2025-08-22 PROCEDURE — 3074F SYST BP LT 130 MM HG: CPT | Mod: CPTII,S$GLB,, | Performed by: OBSTETRICS & GYNECOLOGY

## 2025-08-22 PROCEDURE — 1126F AMNT PAIN NOTED NONE PRSNT: CPT | Mod: CPTII,S$GLB,, | Performed by: OBSTETRICS & GYNECOLOGY

## 2025-08-22 PROCEDURE — 1159F MED LIST DOCD IN RCRD: CPT | Mod: CPTII,S$GLB,, | Performed by: OBSTETRICS & GYNECOLOGY

## (undated) DEVICE — SOL POVIDONE SCRUB IODINE 4 OZ

## (undated) DEVICE — KIT BIOGUARD AIR WTR SUC VALVE

## (undated) DEVICE — GLOVE BIOGEL SKINSENSE PI 7.5

## (undated) DEVICE — GLASSES EYE PROTECTIVE

## (undated) DEVICE — SOL IRRI STRL WATER 1000ML

## (undated) DEVICE — FORCEP ALLIGATOR 2.8MM W/NDL

## (undated) DEVICE — Device

## (undated) DEVICE — TUBE SUC UNIVERSAL .25XIN 6FT

## (undated) DEVICE — GOWN SURGICAL BRTHBL XL

## (undated) DEVICE — BASIN EMESIS GRAPHITE 500ML

## (undated) DEVICE — SYR SLIP TIP 1CC

## (undated) DEVICE — SOL BETADINE 5%

## (undated) DEVICE — SYR SLIP TIP 60 CC DISP

## (undated) DEVICE — ELECTRODE FOAM 535 TEARDROP

## (undated) DEVICE — JELLY LUBRICATING STERILE 2OZ

## (undated) DEVICE — TUBING OXYGEN CONNECT BUBBLE

## (undated) DEVICE — LINER SUCTION CANNISTER REGUGA

## (undated) DEVICE — KIT VIA CUSTOM PROCEDURE

## (undated) DEVICE — SHIELD EYE ALUM FOX W/ CLOTH

## (undated) DEVICE — SPONGE DRY VIA GREEN

## (undated) DEVICE — UNDERPAD DISPOSABLE 30X30IN

## (undated) DEVICE — CONNECTOR TORRENT SCP OLYMPUS

## (undated) DEVICE — CANNULA SUPERSOFT CO2 M AD 7FT